# Patient Record
Sex: FEMALE | Race: BLACK OR AFRICAN AMERICAN | NOT HISPANIC OR LATINO | Employment: OTHER | URBAN - METROPOLITAN AREA
[De-identification: names, ages, dates, MRNs, and addresses within clinical notes are randomized per-mention and may not be internally consistent; named-entity substitution may affect disease eponyms.]

---

## 2019-01-01 ENCOUNTER — HOSPITAL ENCOUNTER (INPATIENT)
Facility: HOSPITAL | Age: 84
LOS: 5 days | Discharge: HOSPICE | End: 2019-12-07
Attending: EMERGENCY MEDICINE | Admitting: HOSPITALIST

## 2019-01-01 ENCOUNTER — APPOINTMENT (OUTPATIENT)
Dept: GENERAL RADIOLOGY | Facility: HOSPITAL | Age: 84
End: 2019-01-01

## 2019-01-01 ENCOUNTER — TRANSITIONAL CARE MANAGEMENT TELEPHONE ENCOUNTER (OUTPATIENT)
Dept: FAMILY MEDICINE CLINIC | Facility: CLINIC | Age: 84
End: 2019-01-01

## 2019-01-01 ENCOUNTER — APPOINTMENT (OUTPATIENT)
Dept: CT IMAGING | Facility: HOSPITAL | Age: 84
End: 2019-01-01

## 2019-01-01 ENCOUNTER — TELEPHONE (OUTPATIENT)
Dept: FAMILY MEDICINE CLINIC | Facility: CLINIC | Age: 84
End: 2019-01-01

## 2019-01-01 ENCOUNTER — APPOINTMENT (OUTPATIENT)
Dept: CARDIOLOGY | Facility: HOSPITAL | Age: 84
End: 2019-01-01

## 2019-01-01 ENCOUNTER — OFFICE VISIT (OUTPATIENT)
Dept: FAMILY MEDICINE CLINIC | Facility: CLINIC | Age: 84
End: 2019-01-01

## 2019-01-01 ENCOUNTER — APPOINTMENT (OUTPATIENT)
Dept: MRI IMAGING | Facility: HOSPITAL | Age: 84
End: 2019-01-01

## 2019-01-01 ENCOUNTER — HOSPITAL ENCOUNTER (OUTPATIENT)
Facility: HOSPITAL | Age: 84
Setting detail: OBSERVATION
Discharge: HOME HEALTH | End: 2019-10-09
Attending: EMERGENCY MEDICINE | Admitting: HOSPITALIST

## 2019-01-01 ENCOUNTER — HOSPITAL ENCOUNTER (INPATIENT)
Facility: HOSPITAL | Age: 84
LOS: 5 days | End: 2019-12-12
Attending: HOSPITALIST | Admitting: HOSPITALIST

## 2019-01-01 ENCOUNTER — HOSPITAL ENCOUNTER (INPATIENT)
Facility: HOSPITAL | Age: 84
LOS: 6 days | Discharge: NURSING, REHAB OR LONG-TERM CARE | End: 2019-11-15
Attending: EMERGENCY MEDICINE | Admitting: HOSPITALIST

## 2019-01-01 ENCOUNTER — READMISSION MANAGEMENT (OUTPATIENT)
Dept: CALL CENTER | Facility: HOSPITAL | Age: 84
End: 2019-01-01

## 2019-01-01 ENCOUNTER — HOSPITAL ENCOUNTER (OUTPATIENT)
Facility: HOSPITAL | Age: 84
Setting detail: OBSERVATION
Discharge: HOME | End: 2019-08-09
Attending: EMERGENCY MEDICINE | Admitting: HOSPITALIST

## 2019-01-01 VITALS
HEIGHT: 68 IN | HEART RATE: 97 BPM | WEIGHT: 118.17 LBS | DIASTOLIC BLOOD PRESSURE: 64 MMHG | OXYGEN SATURATION: 99 % | TEMPERATURE: 97.6 F | RESPIRATION RATE: 16 BRPM | SYSTOLIC BLOOD PRESSURE: 129 MMHG | BODY MASS INDEX: 17.91 KG/M2

## 2019-01-01 VITALS
HEART RATE: 75 BPM | WEIGHT: 110 LBS | TEMPERATURE: 98.9 F | OXYGEN SATURATION: 99 % | SYSTOLIC BLOOD PRESSURE: 144 MMHG | BODY MASS INDEX: 17.27 KG/M2 | DIASTOLIC BLOOD PRESSURE: 87 MMHG | HEIGHT: 67 IN | RESPIRATION RATE: 18 BRPM

## 2019-01-01 VITALS
TEMPERATURE: 97.6 F | WEIGHT: 118.4 LBS | SYSTOLIC BLOOD PRESSURE: 110 MMHG | DIASTOLIC BLOOD PRESSURE: 68 MMHG | HEIGHT: 63 IN | BODY MASS INDEX: 20.98 KG/M2 | HEART RATE: 71 BPM | OXYGEN SATURATION: 99 %

## 2019-01-01 VITALS
DIASTOLIC BLOOD PRESSURE: 47 MMHG | WEIGHT: 110.23 LBS | TEMPERATURE: 96.8 F | RESPIRATION RATE: 20 BRPM | SYSTOLIC BLOOD PRESSURE: 75 MMHG | HEIGHT: 64 IN | OXYGEN SATURATION: 100 % | BODY MASS INDEX: 18.82 KG/M2 | HEART RATE: 105 BPM

## 2019-01-01 VITALS — SYSTOLIC BLOOD PRESSURE: 74 MMHG | OXYGEN SATURATION: 79 % | TEMPERATURE: 97.2 F | DIASTOLIC BLOOD PRESSURE: 41 MMHG

## 2019-01-01 VITALS
HEIGHT: 63 IN | OXYGEN SATURATION: 98 % | SYSTOLIC BLOOD PRESSURE: 113 MMHG | RESPIRATION RATE: 16 BRPM | BODY MASS INDEX: 21.19 KG/M2 | DIASTOLIC BLOOD PRESSURE: 65 MMHG | TEMPERATURE: 97.7 F | WEIGHT: 119.6 LBS | HEART RATE: 87 BPM

## 2019-01-01 DIAGNOSIS — N39.0 COMPLICATED UTI (URINARY TRACT INFECTION): Primary | ICD-10-CM

## 2019-01-01 DIAGNOSIS — E86.0 DEHYDRATION: ICD-10-CM

## 2019-01-01 DIAGNOSIS — N17.9 AKI (ACUTE KIDNEY INJURY) (HCC): ICD-10-CM

## 2019-01-01 DIAGNOSIS — E61.1 IRON DEFICIENCY: ICD-10-CM

## 2019-01-01 DIAGNOSIS — E83.52 HYPERCALCEMIA: Primary | ICD-10-CM

## 2019-01-01 DIAGNOSIS — Z74.09 IMMOBILITY: ICD-10-CM

## 2019-01-01 DIAGNOSIS — E55.9 VITAMIN D DEFICIENCY: ICD-10-CM

## 2019-01-01 DIAGNOSIS — R55 SYNCOPE, UNSPECIFIED SYNCOPE TYPE: Primary | ICD-10-CM

## 2019-01-01 DIAGNOSIS — R53.1 GENERALIZED WEAKNESS: ICD-10-CM

## 2019-01-01 DIAGNOSIS — M25.561 ACUTE BILATERAL KNEE PAIN: ICD-10-CM

## 2019-01-01 DIAGNOSIS — R33.9 URINARY RETENTION: ICD-10-CM

## 2019-01-01 DIAGNOSIS — M15.9 PRIMARY OSTEOARTHRITIS INVOLVING MULTIPLE JOINTS: ICD-10-CM

## 2019-01-01 DIAGNOSIS — M25.562 ACUTE BILATERAL KNEE PAIN: ICD-10-CM

## 2019-01-01 DIAGNOSIS — S22.070A COMPRESSION FRACTURE OF T10 VERTEBRA, INITIAL ENCOUNTER (HCC): Primary | ICD-10-CM

## 2019-01-01 DIAGNOSIS — R53.1 WEAKNESS: ICD-10-CM

## 2019-01-01 DIAGNOSIS — R25.1 TREMOR: ICD-10-CM

## 2019-01-01 DIAGNOSIS — E87.20 LACTIC ACIDOSIS: ICD-10-CM

## 2019-01-01 DIAGNOSIS — K59.00 CONSTIPATION, UNSPECIFIED CONSTIPATION TYPE: ICD-10-CM

## 2019-01-01 DIAGNOSIS — K59.00 CONSTIPATION, UNSPECIFIED CONSTIPATION TYPE: Primary | ICD-10-CM

## 2019-01-01 DIAGNOSIS — L98.9 SKIN LESION: ICD-10-CM

## 2019-01-01 DIAGNOSIS — R35.0 URINARY FREQUENCY: Primary | ICD-10-CM

## 2019-01-01 DIAGNOSIS — R41.82 ALTERED MENTAL STATUS, UNSPECIFIED ALTERED MENTAL STATUS TYPE: ICD-10-CM

## 2019-01-01 DIAGNOSIS — D64.9 ANEMIA, UNSPECIFIED TYPE: ICD-10-CM

## 2019-01-01 LAB
25(OH)D3+25(OH)D2 SERPL-MCNC: 37.9 NG/ML (ref 30–100)
ALBUMIN SERPL-MCNC: 2.6 G/DL (ref 3.5–5.2)
ALBUMIN SERPL-MCNC: 3.2 G/DL (ref 3.5–5.2)
ALBUMIN SERPL-MCNC: 3.3 G/DL (ref 3.5–5.2)
ALBUMIN SERPL-MCNC: 3.5 G/DL (ref 3.5–5.2)
ALBUMIN SERPL-MCNC: 3.9 G/DL (ref 3.5–5.2)
ALBUMIN SERPL-MCNC: 4 G/DL (ref 3.5–5.2)
ALBUMIN/GLOB SERPL: 0.6 G/DL
ALBUMIN/GLOB SERPL: 0.6 G/DL
ALBUMIN/GLOB SERPL: 1 G/DL
ALBUMIN/GLOB SERPL: 1.2 G/DL
ALBUMIN/GLOB SERPL: 1.2 G/DL
ALBUMIN/GLOB SERPL: 1.3 G/DL
ALP SERPL-CCNC: 61 U/L (ref 39–117)
ALP SERPL-CCNC: 63 U/L (ref 39–117)
ALP SERPL-CCNC: 68 U/L (ref 39–117)
ALP SERPL-CCNC: 76 U/L (ref 39–117)
ALP SERPL-CCNC: 81 U/L (ref 39–117)
ALP SERPL-CCNC: 84 U/L (ref 39–117)
ALT SERPL W P-5'-P-CCNC: 14 U/L (ref 1–33)
ALT SERPL W P-5'-P-CCNC: 14 U/L (ref 1–33)
ALT SERPL W P-5'-P-CCNC: 17 U/L (ref 1–33)
ALT SERPL W P-5'-P-CCNC: 9 U/L (ref 1–33)
ALT SERPL W P-5'-P-CCNC: 9 U/L (ref 1–33)
ALT SERPL-CCNC: 8 U/L (ref 1–33)
ANION GAP SERPL CALCULATED.3IONS-SCNC: 10 MMOL/L (ref 5–15)
ANION GAP SERPL CALCULATED.3IONS-SCNC: 11.6 MMOL/L (ref 5–15)
ANION GAP SERPL CALCULATED.3IONS-SCNC: 12.4 MMOL/L (ref 5–15)
ANION GAP SERPL CALCULATED.3IONS-SCNC: 13.4 MMOL/L (ref 5–15)
ANION GAP SERPL CALCULATED.3IONS-SCNC: 14.2 MMOL/L (ref 5–15)
ANION GAP SERPL CALCULATED.3IONS-SCNC: 15.7 MMOL/L (ref 5–15)
ANION GAP SERPL CALCULATED.3IONS-SCNC: 17.3 MMOL/L (ref 5–15)
ANION GAP SERPL CALCULATED.3IONS-SCNC: 4.9 MMOL/L (ref 5–15)
ANION GAP SERPL CALCULATED.3IONS-SCNC: 8.4 MMOL/L (ref 5–15)
ANION GAP SERPL CALCULATED.3IONS-SCNC: 8.4 MMOL/L (ref 5–15)
ANION GAP SERPL CALCULATED.3IONS-SCNC: 8.7 MMOL/L (ref 5–15)
ANION GAP SERPL CALCULATED.3IONS-SCNC: 9.2 MMOL/L (ref 5–15)
ANION GAP SERPL CALCULATED.3IONS-SCNC: 9.2 MMOL/L (ref 5–15)
ANISOCYTOSIS BLD QL: ABNORMAL
AORTIC DIMENSIONLESS INDEX: 0.6 (DI)
AST SERPL-CCNC: 14 U/L (ref 1–32)
AST SERPL-CCNC: 14 U/L (ref 1–32)
AST SERPL-CCNC: 18 U/L (ref 1–32)
AST SERPL-CCNC: 22 U/L (ref 1–32)
AST SERPL-CCNC: 26 U/L (ref 1–32)
AST SERPL-CCNC: 44 U/L (ref 1–32)
BACTERIA BLD CULT: ABNORMAL
BACTERIA BLD CULT: NORMAL
BACTERIA SPEC AEROBE CULT: ABNORMAL
BACTERIA SPEC AEROBE CULT: NORMAL
BACTERIA SPEC AEROBE CULT: NORMAL
BACTERIA UR QL AUTO: ABNORMAL /HPF
BACTERIA UR QL AUTO: ABNORMAL /HPF
BASOPHILS # BLD AUTO: 0.01 10*3/MM3 (ref 0–0.2)
BASOPHILS # BLD AUTO: 0.01 10*3/MM3 (ref 0–0.2)
BASOPHILS # BLD AUTO: 0.03 10*3/MM3 (ref 0–0.2)
BASOPHILS # BLD AUTO: 0.03 10*3/MM3 (ref 0–0.2)
BASOPHILS NFR BLD AUTO: 0.1 % (ref 0–1.5)
BASOPHILS NFR BLD AUTO: 0.2 % (ref 0–1.5)
BASOPHILS NFR BLD AUTO: 0.2 % (ref 0–1.5)
BASOPHILS NFR BLD AUTO: 0.4 % (ref 0–1.5)
BH CV ECHO MEAS - AO MAX PG: 16.7 MMHG
BH CV ECHO MEAS - AO MEAN PG (FULL): 4 MMHG
BH CV ECHO MEAS - AO MEAN PG: 8 MMHG
BH CV ECHO MEAS - AO ROOT AREA (BSA CORRECTED): 1.4
BH CV ECHO MEAS - AO ROOT AREA: 3.8 CM^2
BH CV ECHO MEAS - AO ROOT DIAM: 2.2 CM
BH CV ECHO MEAS - AO V2 MAX: 204 CM/SEC
BH CV ECHO MEAS - AO V2 MEAN: 137 CM/SEC
BH CV ECHO MEAS - AO V2 VTI: 47.1 CM
BH CV ECHO MEAS - AVA(I,A): 2 CM^2
BH CV ECHO MEAS - AVA(I,D): 2 CM^2
BH CV ECHO MEAS - BSA(HAYCOCK): 1.6 M^2
BH CV ECHO MEAS - BSA: 1.6 M^2
BH CV ECHO MEAS - BZI_BMI: 22 KILOGRAMS/M^2
BH CV ECHO MEAS - BZI_METRIC_HEIGHT: 160 CM
BH CV ECHO MEAS - BZI_METRIC_WEIGHT: 56.2 KG
BH CV ECHO MEAS - EDV(CUBED): 64 ML
BH CV ECHO MEAS - EDV(MOD-SP2): 69 ML
BH CV ECHO MEAS - EDV(MOD-SP4): 79 ML
BH CV ECHO MEAS - EDV(TEICH): 70 ML
BH CV ECHO MEAS - EF(CUBED): 69.2 %
BH CV ECHO MEAS - EF(MOD-BP): 61 %
BH CV ECHO MEAS - EF(MOD-SP2): 65.2 %
BH CV ECHO MEAS - EF(MOD-SP4): 57 %
BH CV ECHO MEAS - EF(TEICH): 61.4 %
BH CV ECHO MEAS - ESV(CUBED): 19.7 ML
BH CV ECHO MEAS - ESV(MOD-SP2): 24 ML
BH CV ECHO MEAS - ESV(MOD-SP4): 34 ML
BH CV ECHO MEAS - ESV(TEICH): 27 ML
BH CV ECHO MEAS - FS: 32.5 %
BH CV ECHO MEAS - IVS/LVPW: 1.1
BH CV ECHO MEAS - IVSD: 1.1 CM
BH CV ECHO MEAS - LAT PEAK E' VEL: 5 CM/SEC
BH CV ECHO MEAS - LV DIASTOLIC VOL/BSA (35-75): 50.1 ML/M^2
BH CV ECHO MEAS - LV MASS(C)D: 136.2 GRAMS
BH CV ECHO MEAS - LV MASS(C)DI: 86.3 GRAMS/M^2
BH CV ECHO MEAS - LV MAX PG: 6 MMHG
BH CV ECHO MEAS - LV MEAN PG: 4 MMHG
BH CV ECHO MEAS - LV SYSTOLIC VOL/BSA (12-30): 21.5 ML/M^2
BH CV ECHO MEAS - LV V1 MAX: 120 CM/SEC
BH CV ECHO MEAS - LV V1 MEAN: 90.6 CM/SEC
BH CV ECHO MEAS - LV V1 VTI: 32.6 CM
BH CV ECHO MEAS - LVIDD: 4 CM
BH CV ECHO MEAS - LVIDS: 2.7 CM
BH CV ECHO MEAS - LVLD AP2: 6.7 CM
BH CV ECHO MEAS - LVLD AP4: 7.6 CM
BH CV ECHO MEAS - LVLS AP2: 5.7 CM
BH CV ECHO MEAS - LVLS AP4: 6.5 CM
BH CV ECHO MEAS - LVOT AREA (M): 2.8 CM^2
BH CV ECHO MEAS - LVOT AREA: 2.8 CM^2
BH CV ECHO MEAS - LVOT DIAM: 1.9 CM
BH CV ECHO MEAS - LVPWD: 1 CM
BH CV ECHO MEAS - MED PEAK E' VEL: 4 CM/SEC
BH CV ECHO MEAS - MR MAX PG: 121 MMHG
BH CV ECHO MEAS - MR MAX VEL: 550 CM/SEC
BH CV ECHO MEAS - MV A DUR: 0.16 SEC
BH CV ECHO MEAS - MV A MAX VEL: 123 CM/SEC
BH CV ECHO MEAS - MV DEC SLOPE: 343 CM/SEC^2
BH CV ECHO MEAS - MV DEC TIME: 335 SEC
BH CV ECHO MEAS - MV E MAX VEL: 89.8 CM/SEC
BH CV ECHO MEAS - MV E/A: 0.73
BH CV ECHO MEAS - MV MEAN PG: 3 MMHG
BH CV ECHO MEAS - MV P1/2T MAX VEL: 116 CM/SEC
BH CV ECHO MEAS - MV P1/2T: 99.1 MSEC
BH CV ECHO MEAS - MV V2 MEAN: 78.8 CM/SEC
BH CV ECHO MEAS - MV V2 VTI: 42.7 CM
BH CV ECHO MEAS - MVA P1/2T LCG: 1.9 CM^2
BH CV ECHO MEAS - MVA(P1/2T): 2.2 CM^2
BH CV ECHO MEAS - MVA(VTI): 2.2 CM^2
BH CV ECHO MEAS - PULM A REVS DUR: 0.13 SEC
BH CV ECHO MEAS - PULM A REVS VEL: 28.6 CM/SEC
BH CV ECHO MEAS - PULM DIAS VEL: 31.6 CM/SEC
BH CV ECHO MEAS - PULM S/D: 1.5
BH CV ECHO MEAS - PULM SYS VEL: 46.8 CM/SEC
BH CV ECHO MEAS - RAP SYSTOLE: 3 MMHG
BH CV ECHO MEAS - RVSP: 35 MMHG
BH CV ECHO MEAS - SI(AO): 113.4 ML/M^2
BH CV ECHO MEAS - SI(CUBED): 28.1 ML/M^2
BH CV ECHO MEAS - SI(LVOT): 58.6 ML/M^2
BH CV ECHO MEAS - SI(MOD-SP2): 28.5 ML/M^2
BH CV ECHO MEAS - SI(MOD-SP4): 28.5 ML/M^2
BH CV ECHO MEAS - SI(TEICH): 27.2 ML/M^2
BH CV ECHO MEAS - SV(AO): 179 ML
BH CV ECHO MEAS - SV(CUBED): 44.3 ML
BH CV ECHO MEAS - SV(LVOT): 92.5 ML
BH CV ECHO MEAS - SV(MOD-SP2): 45 ML
BH CV ECHO MEAS - SV(MOD-SP4): 45 ML
BH CV ECHO MEAS - SV(TEICH): 43 ML
BH CV ECHO MEAS - TAPSE (>1.6): 2.3 CM2
BH CV ECHO MEAS - TR MAX VEL: 283 CM/SEC
BH CV ECHO MEASUREMENTS AVERAGE E/E' RATIO: 19.96
BH CV XLRA - RV BASE: 3.4 CM
BH CV XLRA - TDI S': 14 CM/SEC
BILIRUB SERPL-MCNC: 0.2 MG/DL (ref 0.2–1.2)
BILIRUB SERPL-MCNC: 0.3 MG/DL (ref 0.2–1.2)
BILIRUB SERPL-MCNC: 0.3 MG/DL (ref 0.2–1.2)
BILIRUB SERPL-MCNC: 0.4 MG/DL (ref 0.2–1.2)
BILIRUB SERPL-MCNC: 0.4 MG/DL (ref 0.2–1.2)
BILIRUB SERPL-MCNC: 0.5 MG/DL (ref 0.2–1.2)
BILIRUB UR QL STRIP: NEGATIVE
BILIRUB UR QL STRIP: NEGATIVE
BUN BLD-MCNC: 18 MG/DL (ref 8–23)
BUN BLD-MCNC: 18 MG/DL (ref 8–23)
BUN BLD-MCNC: 23 MG/DL (ref 8–23)
BUN BLD-MCNC: 23 MG/DL (ref 8–23)
BUN BLD-MCNC: 24 MG/DL (ref 8–23)
BUN BLD-MCNC: 26 MG/DL (ref 8–23)
BUN BLD-MCNC: 32 MG/DL (ref 8–23)
BUN BLD-MCNC: 32 MG/DL (ref 8–23)
BUN BLD-MCNC: 37 MG/DL (ref 8–23)
BUN BLD-MCNC: 45 MG/DL (ref 8–23)
BUN BLD-MCNC: 46 MG/DL (ref 8–23)
BUN BLD-MCNC: 48 MG/DL (ref 8–23)
BUN BLD-MCNC: 49 MG/DL (ref 8–23)
BUN SERPL-MCNC: 22 MG/DL (ref 8–23)
BUN/CREAT SERPL: 16.5 (ref 7–25)
BUN/CREAT SERPL: 17.7 (ref 7–25)
BUN/CREAT SERPL: 20 (ref 7–25)
BUN/CREAT SERPL: 25 (ref 7–25)
BUN/CREAT SERPL: 25 (ref 7–25)
BUN/CREAT SERPL: 26.2 (ref 7–25)
BUN/CREAT SERPL: 26.4 (ref 7–25)
BUN/CREAT SERPL: 27 (ref 7–25)
BUN/CREAT SERPL: 27.2 (ref 7–25)
BUN/CREAT SERPL: 28.9 (ref 7–25)
BUN/CREAT SERPL: 30.1 (ref 7–25)
BUN/CREAT SERPL: 32.7 (ref 7–25)
BUN/CREAT SERPL: 33.3 (ref 7–25)
BUN/CREAT SERPL: 44.5 (ref 7–25)
BURR CELLS BLD QL SMEAR: ABNORMAL
CA-I BLD-MCNC: 6 MG/DL (ref 4.6–5.4)
CA-I SERPL ISE-MCNC: 1.51 MMOL/L (ref 1.15–1.35)
CALCIUM SERPL-MCNC: 10.7 MG/DL (ref 8.2–9.6)
CALCIUM SPEC-SCNC: 10 MG/DL (ref 8.2–9.6)
CALCIUM SPEC-SCNC: 10 MG/DL (ref 8.2–9.6)
CALCIUM SPEC-SCNC: 10.1 MG/DL (ref 8.2–9.6)
CALCIUM SPEC-SCNC: 10.3 MG/DL (ref 8.2–9.6)
CALCIUM SPEC-SCNC: 10.4 MG/DL (ref 8.2–9.6)
CALCIUM SPEC-SCNC: 10.7 MG/DL (ref 8.2–9.6)
CALCIUM SPEC-SCNC: 11 MG/DL (ref 8.2–9.6)
CALCIUM SPEC-SCNC: 11.4 MG/DL (ref 8.2–9.6)
CALCIUM SPEC-SCNC: 9.4 MG/DL (ref 8.2–9.6)
CALCIUM SPEC-SCNC: 9.4 MG/DL (ref 8.2–9.6)
CALCIUM SPEC-SCNC: 9.6 MG/DL (ref 8.2–9.6)
CALCIUM SPEC-SCNC: 9.7 MG/DL (ref 8.2–9.6)
CALCIUM SPEC-SCNC: 9.8 MG/DL (ref 8.2–9.6)
CHLORIDE SERPL-SCNC: 102 MMOL/L (ref 98–107)
CHLORIDE SERPL-SCNC: 103 MMOL/L (ref 98–107)
CHLORIDE SERPL-SCNC: 105 MMOL/L (ref 98–107)
CHLORIDE SERPL-SCNC: 105 MMOL/L (ref 98–107)
CHLORIDE SERPL-SCNC: 107 MMOL/L (ref 98–107)
CHLORIDE SERPL-SCNC: 108 MMOL/L (ref 98–107)
CHLORIDE SERPL-SCNC: 109 MMOL/L (ref 98–107)
CHLORIDE SERPL-SCNC: 116 MMOL/L (ref 98–107)
CHLORIDE SERPL-SCNC: 96 MMOL/L (ref 98–107)
CHLORIDE SERPL-SCNC: 98 MMOL/L (ref 98–107)
CHOLEST SERPL-MCNC: 122 MG/DL (ref 0–200)
CHOLEST SERPL-MCNC: 149 MG/DL (ref 0–200)
CLARITY UR: ABNORMAL
CLARITY UR: CLEAR
CO2 SERPL-SCNC: 17.3 MMOL/L (ref 22–29)
CO2 SERPL-SCNC: 18.8 MMOL/L (ref 22–29)
CO2 SERPL-SCNC: 22.6 MMOL/L (ref 22–29)
CO2 SERPL-SCNC: 22.7 MMOL/L (ref 22–29)
CO2 SERPL-SCNC: 23.6 MMOL/L (ref 22–29)
CO2 SERPL-SCNC: 24.6 MMOL/L (ref 22–29)
CO2 SERPL-SCNC: 24.6 MMOL/L (ref 22–29)
CO2 SERPL-SCNC: 25.2 MMOL/L (ref 22–29)
CO2 SERPL-SCNC: 25.8 MMOL/L (ref 22–29)
CO2 SERPL-SCNC: 26.8 MMOL/L (ref 22–29)
CO2 SERPL-SCNC: 27 MMOL/L (ref 22–29)
CO2 SERPL-SCNC: 27.1 MMOL/L (ref 22–29)
CO2 SERPL-SCNC: 27.3 MMOL/L (ref 22–29)
CO2 SERPL-SCNC: 30.4 MMOL/L (ref 22–29)
COLOR UR: ABNORMAL
COLOR UR: YELLOW
CREAT BLD-MCNC: 0.87 MG/DL (ref 0.57–1)
CREAT BLD-MCNC: 0.89 MG/DL (ref 0.57–1)
CREAT BLD-MCNC: 0.9 MG/DL (ref 0.57–1)
CREAT BLD-MCNC: 0.9 MG/DL (ref 0.57–1)
CREAT BLD-MCNC: 0.92 MG/DL (ref 0.57–1)
CREAT BLD-MCNC: 0.98 MG/DL (ref 0.57–1)
CREAT BLD-MCNC: 1.09 MG/DL (ref 0.57–1)
CREAT BLD-MCNC: 1.1 MG/DL (ref 0.57–1)
CREAT BLD-MCNC: 1.22 MG/DL (ref 0.57–1)
CREAT BLD-MCNC: 1.23 MG/DL (ref 0.57–1)
CREAT BLD-MCNC: 1.35 MG/DL (ref 0.57–1)
CREAT BLD-MCNC: 1.69 MG/DL (ref 0.57–1)
CREAT BLD-MCNC: 2.71 MG/DL (ref 0.57–1)
CREAT SERPL-MCNC: 0.88 MG/DL (ref 0.57–1)
D-LACTATE SERPL-SCNC: 2.2 MMOL/L (ref 0.5–2)
D-LACTATE SERPL-SCNC: 2.6 MMOL/L (ref 0.5–2)
DEPRECATED RDW RBC AUTO: 39.9 FL (ref 37–54)
DEPRECATED RDW RBC AUTO: 40.5 FL (ref 37–54)
DEPRECATED RDW RBC AUTO: 41 FL (ref 37–54)
DEPRECATED RDW RBC AUTO: 41.3 FL (ref 37–54)
DEPRECATED RDW RBC AUTO: 42.2 FL (ref 37–54)
DEPRECATED RDW RBC AUTO: 42.8 FL (ref 37–54)
DEPRECATED RDW RBC AUTO: 43.6 FL (ref 37–54)
DEPRECATED RDW RBC AUTO: 43.9 FL (ref 37–54)
DEPRECATED RDW RBC AUTO: 44.3 FL (ref 37–54)
DEPRECATED RDW RBC AUTO: 44.7 FL (ref 37–54)
DEPRECATED RDW RBC AUTO: 49.6 FL (ref 37–54)
DEPRECATED RDW RBC AUTO: 50.4 FL (ref 37–54)
DEPRECATED RDW RBC AUTO: 52.4 FL (ref 37–54)
EOSINOPHIL # BLD AUTO: 0 10*3/MM3 (ref 0–0.4)
EOSINOPHIL # BLD AUTO: 0.01 10*3/MM3 (ref 0–0.4)
EOSINOPHIL # BLD MANUAL: 0.06 10*3/MM3 (ref 0–0.4)
EOSINOPHIL NFR BLD AUTO: 0 % (ref 0.3–6.2)
EOSINOPHIL NFR BLD AUTO: 0.2 % (ref 0.3–6.2)
EOSINOPHIL NFR BLD MANUAL: 1 % (ref 0.3–6.2)
ERYTHROCYTE [DISTWIDTH] IN BLOOD BY AUTOMATED COUNT: 13 % (ref 12.3–15.4)
ERYTHROCYTE [DISTWIDTH] IN BLOOD BY AUTOMATED COUNT: 13.1 % (ref 12.3–15.4)
ERYTHROCYTE [DISTWIDTH] IN BLOOD BY AUTOMATED COUNT: 13.1 % (ref 12.3–15.4)
ERYTHROCYTE [DISTWIDTH] IN BLOOD BY AUTOMATED COUNT: 13.3 % (ref 12.3–15.4)
ERYTHROCYTE [DISTWIDTH] IN BLOOD BY AUTOMATED COUNT: 13.3 % (ref 12.3–15.4)
ERYTHROCYTE [DISTWIDTH] IN BLOOD BY AUTOMATED COUNT: 13.6 % (ref 12.3–15.4)
ERYTHROCYTE [DISTWIDTH] IN BLOOD BY AUTOMATED COUNT: 14.5 % (ref 12.3–15.4)
ERYTHROCYTE [DISTWIDTH] IN BLOOD BY AUTOMATED COUNT: 14.6 % (ref 12.3–15.4)
ERYTHROCYTE [DISTWIDTH] IN BLOOD BY AUTOMATED COUNT: 14.6 % (ref 12.3–15.4)
FOLATE SERPL-MCNC: >20 NG/ML (ref 4.78–24.2)
GFR SERPL CREATININE-BSD FRML MDRD: 20 ML/MIN/1.73
GFR SERPL CREATININE-BSD FRML MDRD: 34 ML/MIN/1.73
GFR SERPL CREATININE-BSD FRML MDRD: 41 ML/MIN/1.73
GFR SERPL CREATININE-BSD FRML MDRD: 44 ML/MIN/1.73
GFR SERPL CREATININE-BSD FRML MDRD: 49 ML/MIN/1.73
GFR SERPL CREATININE-BSD FRML MDRD: 50 ML/MIN/1.73
GFR SERPL CREATININE-BSD FRML MDRD: 56 ML/MIN/1.73
GFR SERPL CREATININE-BSD FRML MDRD: 57 ML/MIN/1.73
GFR SERPL CREATININE-BSD FRML MDRD: 64 ML/MIN/1.73
GFR SERPL CREATININE-BSD FRML MDRD: 69 ML/MIN/1.73
GFR SERPL CREATININE-BSD FRML MDRD: 71 ML/MIN/1.73
GFR SERPL CREATININE-BSD FRML MDRD: 71 ML/MIN/1.73
GFR SERPL CREATININE-BSD FRML MDRD: 72 ML/MIN/1.73
GFR SERPL CREATININE-BSD FRML MDRD: 73 ML/MIN/1.73
GLOBULIN SER CALC-MCNC: 3.3 GM/DL
GLOBULIN UR ELPH-MCNC: 2.8 GM/DL
GLOBULIN UR ELPH-MCNC: 3.1 GM/DL
GLOBULIN UR ELPH-MCNC: 3.6 GM/DL
GLOBULIN UR ELPH-MCNC: 4.1 GM/DL
GLOBULIN UR ELPH-MCNC: 5.2 GM/DL
GLUCOSE BLD-MCNC: 101 MG/DL (ref 65–99)
GLUCOSE BLD-MCNC: 115 MG/DL (ref 65–99)
GLUCOSE BLD-MCNC: 116 MG/DL (ref 65–99)
GLUCOSE BLD-MCNC: 130 MG/DL (ref 65–99)
GLUCOSE BLD-MCNC: 144 MG/DL (ref 65–99)
GLUCOSE BLD-MCNC: 147 MG/DL (ref 65–99)
GLUCOSE BLD-MCNC: 78 MG/DL (ref 65–99)
GLUCOSE BLD-MCNC: 82 MG/DL (ref 65–99)
GLUCOSE BLD-MCNC: 83 MG/DL (ref 65–99)
GLUCOSE BLD-MCNC: 92 MG/DL (ref 65–99)
GLUCOSE BLD-MCNC: 93 MG/DL (ref 65–99)
GLUCOSE BLD-MCNC: 93 MG/DL (ref 65–99)
GLUCOSE BLD-MCNC: 97 MG/DL (ref 65–99)
GLUCOSE BLDC GLUCOMTR-MCNC: 142 MG/DL (ref 70–130)
GLUCOSE BLDC GLUCOMTR-MCNC: 93 MG/DL (ref 70–130)
GLUCOSE BLDC GLUCOMTR-MCNC: 94 MG/DL (ref 70–130)
GLUCOSE SERPL-MCNC: 112 MG/DL (ref 65–99)
GLUCOSE UR STRIP-MCNC: NEGATIVE MG/DL
GLUCOSE UR STRIP-MCNC: NEGATIVE MG/DL
GRAM STN SPEC: ABNORMAL
GRAM STN SPEC: ABNORMAL
HBA1C MFR BLD: 5.5 % (ref 4.8–5.6)
HBA1C MFR BLD: 6.1 % (ref 4.8–5.6)
HCT VFR BLD AUTO: 28.7 % (ref 34–46.6)
HCT VFR BLD AUTO: 29 % (ref 34–46.6)
HCT VFR BLD AUTO: 29 % (ref 34–46.6)
HCT VFR BLD AUTO: 29.2 % (ref 34–46.6)
HCT VFR BLD AUTO: 29.6 % (ref 34–46.6)
HCT VFR BLD AUTO: 30.3 % (ref 34–46.6)
HCT VFR BLD AUTO: 30.8 % (ref 34–46.6)
HCT VFR BLD AUTO: 31.1 % (ref 34–46.6)
HCT VFR BLD AUTO: 32.5 % (ref 34–46.6)
HCT VFR BLD AUTO: 32.6 % (ref 34–46.6)
HCT VFR BLD AUTO: 34.2 % (ref 34–46.6)
HCT VFR BLD AUTO: 35.2 % (ref 34–46.6)
HCT VFR BLD AUTO: 40.3 % (ref 34–46.6)
HDLC SERPL-MCNC: 21 MG/DL (ref 40–60)
HDLC SERPL-MCNC: 49 MG/DL (ref 40–60)
HGB BLD-MCNC: 10 G/DL (ref 12–15.9)
HGB BLD-MCNC: 10 G/DL (ref 12–15.9)
HGB BLD-MCNC: 10.1 G/DL (ref 12–15.9)
HGB BLD-MCNC: 10.1 G/DL (ref 12–15.9)
HGB BLD-MCNC: 10.3 G/DL (ref 12–15.9)
HGB BLD-MCNC: 10.5 G/DL (ref 12–15.9)
HGB BLD-MCNC: 11.2 G/DL (ref 12–15.9)
HGB BLD-MCNC: 11.6 G/DL (ref 12–15.9)
HGB BLD-MCNC: 12.7 G/DL (ref 12–15.9)
HGB BLD-MCNC: 8.9 G/DL (ref 12–15.9)
HGB BLD-MCNC: 9.4 G/DL (ref 12–15.9)
HGB BLD-MCNC: 9.4 G/DL (ref 12–15.9)
HGB BLD-MCNC: 9.7 G/DL (ref 12–15.9)
HGB UR QL STRIP.AUTO: ABNORMAL
HGB UR QL STRIP.AUTO: ABNORMAL
HOLD SPECIMEN: NORMAL
HYALINE CASTS UR QL AUTO: ABNORMAL /LPF
HYALINE CASTS UR QL AUTO: ABNORMAL /LPF
IMM GRANULOCYTES # BLD AUTO: 0.03 10*3/MM3 (ref 0–0.05)
IMM GRANULOCYTES # BLD AUTO: 0.06 10*3/MM3 (ref 0–0.05)
IMM GRANULOCYTES # BLD AUTO: 0.13 10*3/MM3 (ref 0–0.05)
IMM GRANULOCYTES # BLD AUTO: 0.19 10*3/MM3 (ref 0–0.05)
IMM GRANULOCYTES NFR BLD AUTO: 0.5 % (ref 0–0.5)
IMM GRANULOCYTES NFR BLD AUTO: 0.6 % (ref 0–0.5)
IMM GRANULOCYTES NFR BLD AUTO: 1.5 % (ref 0–0.5)
IMM GRANULOCYTES NFR BLD AUTO: 1.5 % (ref 0–0.5)
IRON SATN MFR SERPL: 16 % (ref 20–50)
IRON SERPL-MCNC: 42 MCG/DL (ref 37–145)
ISOLATED FROM: ABNORMAL
ISOLATED FROM: ABNORMAL
KETONES UR QL STRIP: NEGATIVE
KETONES UR QL STRIP: NEGATIVE
LDLC SERPL CALC-MCNC: 104 MG/DL (ref 0–100)
LDLC SERPL CALC-MCNC: 66 MG/DL (ref 0–100)
LDLC/HDLC SERPL: 1.34 {RATIO}
LDLC/HDLC SERPL: 4.97 {RATIO}
LEFT ATRIUM VOLUME INDEX: 36.5 ML/M2
LEUKOCYTE ESTERASE UR QL STRIP.AUTO: ABNORMAL
LEUKOCYTE ESTERASE UR QL STRIP.AUTO: ABNORMAL
LIPASE SERPL-CCNC: 22 U/L (ref 13–60)
LYMPHOCYTES # BLD AUTO: 0.68 10*3/MM3 (ref 0.7–3.1)
LYMPHOCYTES # BLD AUTO: 0.69 10*3/MM3 (ref 0.7–3.1)
LYMPHOCYTES # BLD AUTO: 0.73 10*3/MM3 (ref 0.7–3.1)
LYMPHOCYTES # BLD AUTO: 0.96 10*3/MM3 (ref 0.7–3.1)
LYMPHOCYTES # BLD MANUAL: 0.49 10*3/MM3 (ref 0.7–3.1)
LYMPHOCYTES # BLD MANUAL: 1.16 10*3/MM3 (ref 0.7–3.1)
LYMPHOCYTES NFR BLD AUTO: 14.8 % (ref 19.6–45.3)
LYMPHOCYTES NFR BLD AUTO: 5.6 % (ref 19.6–45.3)
LYMPHOCYTES NFR BLD AUTO: 6.8 % (ref 19.6–45.3)
LYMPHOCYTES NFR BLD AUTO: 8.1 % (ref 19.6–45.3)
LYMPHOCYTES NFR BLD MANUAL: 2 % (ref 5–12)
LYMPHOCYTES NFR BLD MANUAL: 21 % (ref 19.6–45.3)
LYMPHOCYTES NFR BLD MANUAL: 4 % (ref 19.6–45.3)
LYMPHOCYTES NFR BLD MANUAL: 5 % (ref 5–12)
MAGNESIUM SERPL-MCNC: 2.1 MG/DL (ref 1.7–2.3)
MAGNESIUM SERPL-MCNC: 2.3 MG/DL (ref 1.7–2.3)
MAGNESIUM SERPL-MCNC: 2.3 MG/DL (ref 1.7–2.3)
MAXIMAL PREDICTED HEART RATE: 126 BPM
MCH RBC QN AUTO: 28.2 PG (ref 26.6–33)
MCH RBC QN AUTO: 28.5 PG (ref 26.6–33)
MCH RBC QN AUTO: 28.6 PG (ref 26.6–33)
MCH RBC QN AUTO: 28.7 PG (ref 26.6–33)
MCH RBC QN AUTO: 28.9 PG (ref 26.6–33)
MCH RBC QN AUTO: 29 PG (ref 26.6–33)
MCH RBC QN AUTO: 29 PG (ref 26.6–33)
MCH RBC QN AUTO: 29.2 PG (ref 26.6–33)
MCH RBC QN AUTO: 29.3 PG (ref 26.6–33)
MCH RBC QN AUTO: 29.4 PG (ref 26.6–33)
MCH RBC QN AUTO: 29.4 PG (ref 26.6–33)
MCH RBC QN AUTO: 29.8 PG (ref 26.6–33)
MCH RBC QN AUTO: 29.9 PG (ref 26.6–33)
MCHC RBC AUTO-ENTMCNC: 29.8 G/DL (ref 31.5–35.7)
MCHC RBC AUTO-ENTMCNC: 30.5 G/DL (ref 31.5–35.7)
MCHC RBC AUTO-ENTMCNC: 31 G/DL (ref 31.5–35.7)
MCHC RBC AUTO-ENTMCNC: 31.5 G/DL (ref 31.5–35.7)
MCHC RBC AUTO-ENTMCNC: 31.8 G/DL (ref 31.5–35.7)
MCHC RBC AUTO-ENTMCNC: 31.8 G/DL (ref 31.5–35.7)
MCHC RBC AUTO-ENTMCNC: 32.4 G/DL (ref 31.5–35.7)
MCHC RBC AUTO-ENTMCNC: 32.5 G/DL (ref 31.5–35.7)
MCHC RBC AUTO-ENTMCNC: 33.8 G/DL (ref 31.5–35.7)
MCHC RBC AUTO-ENTMCNC: 33.9 G/DL (ref 31.5–35.7)
MCHC RBC AUTO-ENTMCNC: 34 G/DL (ref 31.5–35.7)
MCHC RBC AUTO-ENTMCNC: 34.5 G/DL (ref 31.5–35.7)
MCHC RBC AUTO-ENTMCNC: 35.2 G/DL (ref 31.5–35.7)
MCV RBC AUTO: 82.2 FL (ref 79–97)
MCV RBC AUTO: 83.1 FL (ref 79–97)
MCV RBC AUTO: 85.5 FL (ref 79–97)
MCV RBC AUTO: 87.8 FL (ref 79–97)
MCV RBC AUTO: 88.4 FL (ref 79–97)
MCV RBC AUTO: 89.6 FL (ref 79–97)
MCV RBC AUTO: 89.7 FL (ref 79–97)
MCV RBC AUTO: 90.1 FL (ref 79–97)
MCV RBC AUTO: 90.3 FL (ref 79–97)
MCV RBC AUTO: 92.4 FL (ref 79–97)
MCV RBC AUTO: 93.9 FL (ref 79–97)
MCV RBC AUTO: 94.8 FL (ref 79–97)
MCV RBC AUTO: 97.3 FL (ref 79–97)
MONOCYTES # BLD AUTO: 0.25 10*3/MM3 (ref 0.1–0.9)
MONOCYTES # BLD AUTO: 0.28 10*3/MM3 (ref 0.1–0.9)
MONOCYTES # BLD AUTO: 0.61 10*3/MM3 (ref 0.1–0.9)
MONOCYTES # BLD AUTO: 0.82 10*3/MM3 (ref 0.1–0.9)
MONOCYTES # BLD AUTO: 1.12 10*3/MM3 (ref 0.1–0.9)
MONOCYTES # BLD AUTO: 1.68 10*3/MM3 (ref 0.1–0.9)
MONOCYTES NFR BLD AUTO: 15.7 % (ref 5–12)
MONOCYTES NFR BLD AUTO: 17.2 % (ref 5–12)
MONOCYTES NFR BLD AUTO: 6.6 % (ref 5–12)
MONOCYTES NFR BLD AUTO: 7.2 % (ref 5–12)
NEUTROPHILS # BLD AUTO: 10.67 10*3/MM3 (ref 1.7–7)
NEUTROPHILS # BLD AUTO: 11.5 10*3/MM3 (ref 1.7–7)
NEUTROPHILS # BLD AUTO: 4.03 10*3/MM3 (ref 1.7–7)
NEUTROPHILS # BLD AUTO: 4.37 10*3/MM3 (ref 1.7–7)
NEUTROPHILS # BLD AUTO: 6.98 10*3/MM3 (ref 1.7–7)
NEUTROPHILS # BLD AUTO: 8.25 10*3/MM3 (ref 1.7–7)
NEUTROPHILS NFR BLD AUTO: 67.1 % (ref 42.7–76)
NEUTROPHILS NFR BLD AUTO: 76.8 % (ref 42.7–76)
NEUTROPHILS NFR BLD AUTO: 82.8 % (ref 42.7–76)
NEUTROPHILS NFR BLD AUTO: 86.1 % (ref 42.7–76)
NEUTROPHILS NFR BLD MANUAL: 73 % (ref 42.7–76)
NEUTROPHILS NFR BLD MANUAL: 93.9 % (ref 42.7–76)
NITRITE UR QL STRIP: NEGATIVE
NITRITE UR QL STRIP: NEGATIVE
NRBC BLD AUTO-RTO: 0 /100 WBC (ref 0–0.2)
NRBC BLD AUTO-RTO: 0.1 /100 WBC (ref 0–0.2)
NT-PROBNP SERPL-MCNC: 193.2 PG/ML (ref 5–1800)
NT-PROBNP SERPL-MCNC: 2576 PG/ML (ref 5–1800)
OVALOCYTES BLD QL SMEAR: ABNORMAL
PH UR STRIP.AUTO: 6.5 [PH] (ref 5–8)
PH UR STRIP.AUTO: >=9 [PH] (ref 5–8)
PLAT MORPH BLD: NORMAL
PLATELET # BLD AUTO: 104 10*3/MM3 (ref 140–450)
PLATELET # BLD AUTO: 111 10*3/MM3 (ref 140–450)
PLATELET # BLD AUTO: 114 10*3/MM3 (ref 140–450)
PLATELET # BLD AUTO: 117 10*3/MM3 (ref 140–450)
PLATELET # BLD AUTO: 130 10*3/MM3 (ref 140–450)
PLATELET # BLD AUTO: 137 10*3/MM3 (ref 140–450)
PLATELET # BLD AUTO: 138 10*3/MM3 (ref 140–450)
PLATELET # BLD AUTO: 141 10*3/MM3 (ref 140–450)
PLATELET # BLD AUTO: 156 10*3/MM3 (ref 140–450)
PLATELET # BLD AUTO: 170 10*3/MM3 (ref 140–450)
PLATELET # BLD AUTO: 191 10*3/MM3 (ref 140–450)
PLATELET # BLD AUTO: 242 10*3/MM3 (ref 140–450)
PLATELET # BLD AUTO: 316 10*3/MM3 (ref 140–450)
PMV BLD AUTO: 10.3 FL (ref 6–12)
PMV BLD AUTO: 10.4 FL (ref 6–12)
PMV BLD AUTO: 10.4 FL (ref 6–12)
PMV BLD AUTO: 10.5 FL (ref 6–12)
PMV BLD AUTO: 10.6 FL (ref 6–12)
PMV BLD AUTO: 10.6 FL (ref 6–12)
PMV BLD AUTO: 10.8 FL (ref 6–12)
PMV BLD AUTO: 10.9 FL (ref 6–12)
PMV BLD AUTO: 10.9 FL (ref 6–12)
PMV BLD AUTO: 11.1 FL (ref 6–12)
PMV BLD AUTO: 11.2 FL (ref 6–12)
PMV BLD AUTO: 11.4 FL (ref 6–12)
PMV BLD AUTO: 11.5 FL (ref 6–12)
POIKILOCYTOSIS BLD QL SMEAR: ABNORMAL
POLYCHROMASIA BLD QL SMEAR: ABNORMAL
POTASSIUM BLD-SCNC: 3.2 MMOL/L (ref 3.5–5.2)
POTASSIUM BLD-SCNC: 3.8 MMOL/L (ref 3.5–5.2)
POTASSIUM BLD-SCNC: 4 MMOL/L (ref 3.5–5.2)
POTASSIUM BLD-SCNC: 4 MMOL/L (ref 3.5–5.2)
POTASSIUM BLD-SCNC: 4.2 MMOL/L (ref 3.5–5.2)
POTASSIUM BLD-SCNC: 4.4 MMOL/L (ref 3.5–5.2)
POTASSIUM BLD-SCNC: 4.5 MMOL/L (ref 3.5–5.2)
POTASSIUM BLD-SCNC: 4.8 MMOL/L (ref 3.5–5.2)
POTASSIUM BLD-SCNC: 5.1 MMOL/L (ref 3.5–5.2)
POTASSIUM SERPL-SCNC: 4.6 MMOL/L (ref 3.5–5.2)
PROCALCITONIN SERPL-MCNC: 0.6 NG/ML (ref 0.1–0.25)
PROT SERPL-MCNC: 6.1 G/DL (ref 6–8.5)
PROT SERPL-MCNC: 6.7 G/DL (ref 6–8.5)
PROT SERPL-MCNC: 7 G/DL (ref 6–8.5)
PROT SERPL-MCNC: 7.1 G/DL (ref 6–8.5)
PROT SERPL-MCNC: 7.3 G/DL (ref 6–8.5)
PROT SERPL-MCNC: 8.4 G/DL (ref 6–8.5)
PROT UR QL STRIP: ABNORMAL
PROT UR QL STRIP: ABNORMAL
PTH-INTACT SERPL-MCNC: 58.9 PG/ML (ref 15–65)
RBC # BLD AUTO: 3.11 10*6/MM3 (ref 3.77–5.28)
RBC # BLD AUTO: 3.21 10*6/MM3 (ref 3.77–5.28)
RBC # BLD AUTO: 3.3 10*6/MM3 (ref 3.77–5.28)
RBC # BLD AUTO: 3.34 10*6/MM3 (ref 3.77–5.28)
RBC # BLD AUTO: 3.42 10*6/MM3 (ref 3.77–5.28)
RBC # BLD AUTO: 3.44 10*6/MM3 (ref 3.77–5.28)
RBC # BLD AUTO: 3.45 10*6/MM3 (ref 3.77–5.28)
RBC # BLD AUTO: 3.49 10*6/MM3 (ref 3.77–5.28)
RBC # BLD AUTO: 3.49 10*6/MM3 (ref 3.77–5.28)
RBC # BLD AUTO: 3.52 10*6/MM3 (ref 3.77–5.28)
RBC # BLD AUTO: 3.81 10*6/MM3 (ref 3.77–5.28)
RBC # BLD AUTO: 4 10*6/MM3 (ref 3.77–5.28)
RBC # BLD AUTO: 4.5 10*6/MM3 (ref 3.77–5.28)
RBC # UR: ABNORMAL /HPF
RBC # UR: ABNORMAL /HPF
RBC MORPH BLD: NORMAL
RBC MORPH BLD: NORMAL
REF LAB TEST METHOD: ABNORMAL
REF LAB TEST METHOD: ABNORMAL
SODIUM BLD-SCNC: 136 MMOL/L (ref 136–145)
SODIUM BLD-SCNC: 137 MMOL/L (ref 136–145)
SODIUM BLD-SCNC: 138 MMOL/L (ref 136–145)
SODIUM BLD-SCNC: 138 MMOL/L (ref 136–145)
SODIUM BLD-SCNC: 139 MMOL/L (ref 136–145)
SODIUM BLD-SCNC: 139 MMOL/L (ref 136–145)
SODIUM BLD-SCNC: 140 MMOL/L (ref 136–145)
SODIUM BLD-SCNC: 141 MMOL/L (ref 136–145)
SODIUM BLD-SCNC: 143 MMOL/L (ref 136–145)
SODIUM BLD-SCNC: 149 MMOL/L (ref 136–145)
SODIUM BLD-SCNC: 149 MMOL/L (ref 136–145)
SODIUM SERPL-SCNC: 141 MMOL/L (ref 136–145)
SP GR UR STRIP: 1.02 (ref 1–1.03)
SP GR UR STRIP: 1.02 (ref 1–1.03)
SQUAMOUS #/AREA URNS HPF: ABNORMAL /HPF
SQUAMOUS #/AREA URNS HPF: ABNORMAL /HPF
STRESS TARGET HR: 107 BPM
T4 FREE SERPL-MCNC: 1.48 NG/DL (ref 0.93–1.7)
TIBC SERPL-MCNC: 254 MCG/DL
TRIGL SERPL-MCNC: 118 MG/DL (ref 0–150)
TRIGL SERPL-MCNC: 37 MG/DL (ref 0–150)
TROPONIN T SERPL-MCNC: 0.02 NG/ML (ref 0–0.03)
TROPONIN T SERPL-MCNC: 0.02 NG/ML (ref 0–0.03)
TROPONIN T SERPL-MCNC: <0.01 NG/ML (ref 0–0.03)
TSH SERPL DL<=0.05 MIU/L-ACNC: 0.3 UIU/ML (ref 0.27–4.2)
TSH SERPL DL<=0.05 MIU/L-ACNC: 1.16 MIU/ML (ref 0.27–4.2)
UIBC SERPL-MCNC: 212 MCG/DL (ref 112–346)
UROBILINOGEN UR QL STRIP: ABNORMAL
UROBILINOGEN UR QL STRIP: ABNORMAL
VIT B12 SERPL-MCNC: 360 PG/ML (ref 211–946)
VLDLC SERPL-MCNC: 23.6 MG/DL (ref 5–40)
VLDLC SERPL-MCNC: 7.4 MG/DL (ref 5–40)
WBC MORPH BLD: NORMAL
WBC NRBC COR # BLD: 10.73 10*3/MM3 (ref 3.4–10.8)
WBC NRBC COR # BLD: 12.06 10*3/MM3 (ref 3.4–10.8)
WBC NRBC COR # BLD: 12.25 10*3/MM3 (ref 3.4–10.8)
WBC NRBC COR # BLD: 12.4 10*3/MM3 (ref 3.4–10.8)
WBC NRBC COR # BLD: 12.65 10*3/MM3 (ref 3.4–10.8)
WBC NRBC COR # BLD: 4.87 10*3/MM3 (ref 3.4–10.8)
WBC NRBC COR # BLD: 4.88 10*3/MM3 (ref 3.4–10.8)
WBC NRBC COR # BLD: 5.26 10*3/MM3 (ref 3.4–10.8)
WBC NRBC COR # BLD: 5.52 10*3/MM3 (ref 3.4–10.8)
WBC NRBC COR # BLD: 6.5 10*3/MM3 (ref 3.4–10.8)
WBC NRBC COR # BLD: 7.37 10*3/MM3 (ref 3.4–10.8)
WBC NRBC COR # BLD: 8.43 10*3/MM3 (ref 3.4–10.8)
WBC NRBC COR # BLD: 8.48 10*3/MM3 (ref 3.4–10.8)
WBC UR QL AUTO: ABNORMAL /HPF
WBC UR QL AUTO: ABNORMAL /HPF

## 2019-01-01 PROCEDURE — 80048 BASIC METABOLIC PNL TOTAL CA: CPT | Performed by: NURSE PRACTITIONER

## 2019-01-01 PROCEDURE — P9612 CATHETERIZE FOR URINE SPEC: HCPCS

## 2019-01-01 PROCEDURE — 25010000002 LORAZEPAM PER 2 MG: Performed by: HOSPITALIST

## 2019-01-01 PROCEDURE — 93010 ELECTROCARDIOGRAM REPORT: CPT | Performed by: INTERNAL MEDICINE

## 2019-01-01 PROCEDURE — 97110 THERAPEUTIC EXERCISES: CPT

## 2019-01-01 PROCEDURE — 87086 URINE CULTURE/COLONY COUNT: CPT | Performed by: NURSE PRACTITIONER

## 2019-01-01 PROCEDURE — 70450 CT HEAD/BRAIN W/O DYE: CPT

## 2019-01-01 PROCEDURE — 99231 SBSQ HOSP IP/OBS SF/LOW 25: CPT | Performed by: NURSE PRACTITIONER

## 2019-01-01 PROCEDURE — 80053 COMPREHEN METABOLIC PANEL: CPT | Performed by: INTERNAL MEDICINE

## 2019-01-01 PROCEDURE — 97162 PT EVAL MOD COMPLEX 30 MIN: CPT

## 2019-01-01 PROCEDURE — 25010000002 HYDROMORPHONE PER 4 MG: Performed by: INTERNAL MEDICINE

## 2019-01-01 PROCEDURE — 85027 COMPLETE CBC AUTOMATED: CPT | Performed by: INTERNAL MEDICINE

## 2019-01-01 PROCEDURE — 84484 ASSAY OF TROPONIN QUANT: CPT | Performed by: NURSE PRACTITIONER

## 2019-01-01 PROCEDURE — G0378 HOSPITAL OBSERVATION PER HR: HCPCS

## 2019-01-01 PROCEDURE — 70498 CT ANGIOGRAPHY NECK: CPT

## 2019-01-01 PROCEDURE — 93005 ELECTROCARDIOGRAM TRACING: CPT | Performed by: PHYSICIAN ASSISTANT

## 2019-01-01 PROCEDURE — 97166 OT EVAL MOD COMPLEX 45 MIN: CPT

## 2019-01-01 PROCEDURE — 85027 COMPLETE CBC AUTOMATED: CPT | Performed by: NURSE PRACTITIONER

## 2019-01-01 PROCEDURE — 83735 ASSAY OF MAGNESIUM: CPT | Performed by: INTERNAL MEDICINE

## 2019-01-01 PROCEDURE — 87040 BLOOD CULTURE FOR BACTERIA: CPT | Performed by: NURSE PRACTITIONER

## 2019-01-01 PROCEDURE — 74018 RADEX ABDOMEN 1 VIEW: CPT

## 2019-01-01 PROCEDURE — 83690 ASSAY OF LIPASE: CPT | Performed by: PHYSICIAN ASSISTANT

## 2019-01-01 PROCEDURE — 99285 EMERGENCY DEPT VISIT HI MDM: CPT

## 2019-01-01 PROCEDURE — 81001 URINALYSIS AUTO W/SCOPE: CPT | Performed by: NURSE PRACTITIONER

## 2019-01-01 PROCEDURE — 70553 MRI BRAIN STEM W/O & W/DYE: CPT

## 2019-01-01 PROCEDURE — 93005 ELECTROCARDIOGRAM TRACING: CPT | Performed by: NURSE PRACTITIONER

## 2019-01-01 PROCEDURE — 71046 X-RAY EXAM CHEST 2 VIEWS: CPT

## 2019-01-01 PROCEDURE — 25010000002 MORPHINE PER 10 MG: Performed by: INTERNAL MEDICINE

## 2019-01-01 PROCEDURE — 87150 DNA/RNA AMPLIFIED PROBE: CPT | Performed by: NURSE PRACTITIONER

## 2019-01-01 PROCEDURE — 96361 HYDRATE IV INFUSION ADD-ON: CPT

## 2019-01-01 PROCEDURE — 25010000002 LORAZEPAM PER 2 MG: Performed by: INTERNAL MEDICINE

## 2019-01-01 PROCEDURE — 73560 X-RAY EXAM OF KNEE 1 OR 2: CPT

## 2019-01-01 PROCEDURE — 83605 ASSAY OF LACTIC ACID: CPT | Performed by: NURSE PRACTITIONER

## 2019-01-01 PROCEDURE — 85007 BL SMEAR W/DIFF WBC COUNT: CPT | Performed by: HOSPITALIST

## 2019-01-01 PROCEDURE — 84484 ASSAY OF TROPONIN QUANT: CPT | Performed by: HOSPITALIST

## 2019-01-01 PROCEDURE — 25010000002 MORPHINE PER 10 MG: Performed by: HOSPITALIST

## 2019-01-01 PROCEDURE — 0 IOPAMIDOL PER 1 ML: Performed by: HOSPITALIST

## 2019-01-01 PROCEDURE — 97161 PT EVAL LOW COMPLEX 20 MIN: CPT

## 2019-01-01 PROCEDURE — 99232 SBSQ HOSP IP/OBS MODERATE 35: CPT | Performed by: NURSE PRACTITIONER

## 2019-01-01 PROCEDURE — 87077 CULTURE AEROBIC IDENTIFY: CPT | Performed by: NURSE PRACTITIONER

## 2019-01-01 PROCEDURE — 93306 TTE W/DOPPLER COMPLETE: CPT | Performed by: INTERNAL MEDICINE

## 2019-01-01 PROCEDURE — 80048 BASIC METABOLIC PNL TOTAL CA: CPT | Performed by: INTERNAL MEDICINE

## 2019-01-01 PROCEDURE — 84484 ASSAY OF TROPONIN QUANT: CPT | Performed by: INTERNAL MEDICINE

## 2019-01-01 PROCEDURE — 80061 LIPID PANEL: CPT | Performed by: INTERNAL MEDICINE

## 2019-01-01 PROCEDURE — 81001 URINALYSIS AUTO W/SCOPE: CPT | Performed by: PHYSICIAN ASSISTANT

## 2019-01-01 PROCEDURE — 99221 1ST HOSP IP/OBS SF/LOW 40: CPT | Performed by: NURSE PRACTITIONER

## 2019-01-01 PROCEDURE — 70496 CT ANGIOGRAPHY HEAD: CPT

## 2019-01-01 PROCEDURE — 93005 ELECTROCARDIOGRAM TRACING: CPT | Performed by: INTERNAL MEDICINE

## 2019-01-01 PROCEDURE — 87186 SC STD MICRODIL/AGAR DIL: CPT | Performed by: NURSE PRACTITIONER

## 2019-01-01 PROCEDURE — 36415 COLL VENOUS BLD VENIPUNCTURE: CPT | Performed by: NURSE PRACTITIONER

## 2019-01-01 PROCEDURE — 80053 COMPREHEN METABOLIC PANEL: CPT | Performed by: EMERGENCY MEDICINE

## 2019-01-01 PROCEDURE — 85025 COMPLETE CBC W/AUTO DIFF WBC: CPT | Performed by: HOSPITALIST

## 2019-01-01 PROCEDURE — 83036 HEMOGLOBIN GLYCOSYLATED A1C: CPT | Performed by: HOSPITALIST

## 2019-01-01 PROCEDURE — 93005 ELECTROCARDIOGRAM TRACING: CPT | Performed by: EMERGENCY MEDICINE

## 2019-01-01 PROCEDURE — 83880 ASSAY OF NATRIURETIC PEPTIDE: CPT | Performed by: EMERGENCY MEDICINE

## 2019-01-01 PROCEDURE — 0 GADOBENATE DIMEGLUMINE 529 MG/ML SOLUTION: Performed by: HOSPITALIST

## 2019-01-01 PROCEDURE — 85025 COMPLETE CBC W/AUTO DIFF WBC: CPT | Performed by: EMERGENCY MEDICINE

## 2019-01-01 PROCEDURE — 96360 HYDRATION IV INFUSION INIT: CPT

## 2019-01-01 PROCEDURE — 25010000003 CEFTRIAXONE PER 250 MG: Performed by: HOSPITALIST

## 2019-01-01 PROCEDURE — 0T9B70Z DRAINAGE OF BLADDER WITH DRAINAGE DEVICE, VIA NATURAL OR ARTIFICIAL OPENING: ICD-10-PCS | Performed by: EMERGENCY MEDICINE

## 2019-01-01 PROCEDURE — 80053 COMPREHEN METABOLIC PANEL: CPT | Performed by: PHYSICIAN ASSISTANT

## 2019-01-01 PROCEDURE — 25810000003 SODIUM CHLORIDE 0.9 % WITH KCL 20 MEQ 20-0.9 MEQ/L-% SOLUTION: Performed by: INTERNAL MEDICINE

## 2019-01-01 PROCEDURE — 85007 BL SMEAR W/DIFF WBC COUNT: CPT | Performed by: NURSE PRACTITIONER

## 2019-01-01 PROCEDURE — 84443 ASSAY THYROID STIM HORMONE: CPT | Performed by: INTERNAL MEDICINE

## 2019-01-01 PROCEDURE — 72072 X-RAY EXAM THORAC SPINE 3VWS: CPT

## 2019-01-01 PROCEDURE — 99213 OFFICE O/P EST LOW 20 MIN: CPT | Performed by: NURSE PRACTITIONER

## 2019-01-01 PROCEDURE — 36415 COLL VENOUS BLD VENIPUNCTURE: CPT | Performed by: PHYSICIAN ASSISTANT

## 2019-01-01 PROCEDURE — 82962 GLUCOSE BLOOD TEST: CPT

## 2019-01-01 PROCEDURE — 99284 EMERGENCY DEPT VISIT MOD MDM: CPT

## 2019-01-01 PROCEDURE — A9577 INJ MULTIHANCE: HCPCS | Performed by: HOSPITALIST

## 2019-01-01 PROCEDURE — 80053 COMPREHEN METABOLIC PANEL: CPT | Performed by: NURSE PRACTITIONER

## 2019-01-01 PROCEDURE — 85025 COMPLETE CBC W/AUTO DIFF WBC: CPT | Performed by: PHYSICIAN ASSISTANT

## 2019-01-01 PROCEDURE — 25810000003 SODIUM CHLORIDE 0.9 % WITH KCL 20 MEQ 20-0.9 MEQ/L-% SOLUTION: Performed by: HOSPITALIST

## 2019-01-01 PROCEDURE — 85025 COMPLETE CBC W/AUTO DIFF WBC: CPT | Performed by: NURSE PRACTITIONER

## 2019-01-01 PROCEDURE — 25010000002 FUROSEMIDE PER 20 MG: Performed by: NURSE PRACTITIONER

## 2019-01-01 PROCEDURE — 84484 ASSAY OF TROPONIN QUANT: CPT | Performed by: EMERGENCY MEDICINE

## 2019-01-01 PROCEDURE — 82330 ASSAY OF CALCIUM: CPT | Performed by: INTERNAL MEDICINE

## 2019-01-01 PROCEDURE — 51702 INSERT TEMP BLADDER CATH: CPT

## 2019-01-01 PROCEDURE — 93005 ELECTROCARDIOGRAM TRACING: CPT | Performed by: HOSPITALIST

## 2019-01-01 PROCEDURE — 74176 CT ABD & PELVIS W/O CONTRAST: CPT

## 2019-01-01 PROCEDURE — 85027 COMPLETE CBC AUTOMATED: CPT | Performed by: HOSPITALIST

## 2019-01-01 PROCEDURE — 87147 CULTURE TYPE IMMUNOLOGIC: CPT | Performed by: NURSE PRACTITIONER

## 2019-01-01 PROCEDURE — 97535 SELF CARE MNGMENT TRAINING: CPT

## 2019-01-01 PROCEDURE — 80048 BASIC METABOLIC PNL TOTAL CA: CPT | Performed by: EMERGENCY MEDICINE

## 2019-01-01 PROCEDURE — 99203 OFFICE O/P NEW LOW 30 MIN: CPT | Performed by: FAMILY MEDICINE

## 2019-01-01 PROCEDURE — 83036 HEMOGLOBIN GLYCOSYLATED A1C: CPT | Performed by: INTERNAL MEDICINE

## 2019-01-01 PROCEDURE — 97530 THERAPEUTIC ACTIVITIES: CPT

## 2019-01-01 PROCEDURE — 87040 BLOOD CULTURE FOR BACTERIA: CPT | Performed by: HOSPITALIST

## 2019-01-01 PROCEDURE — 84484 ASSAY OF TROPONIN QUANT: CPT | Performed by: PHYSICIAN ASSISTANT

## 2019-01-01 PROCEDURE — 80061 LIPID PANEL: CPT | Performed by: HOSPITALIST

## 2019-01-01 PROCEDURE — 80048 BASIC METABOLIC PNL TOTAL CA: CPT | Performed by: HOSPITALIST

## 2019-01-01 PROCEDURE — 99205 OFFICE O/P NEW HI 60 MIN: CPT | Performed by: PSYCHIATRY & NEUROLOGY

## 2019-01-01 PROCEDURE — 83735 ASSAY OF MAGNESIUM: CPT | Performed by: NURSE PRACTITIONER

## 2019-01-01 PROCEDURE — 85007 BL SMEAR W/DIFF WBC COUNT: CPT | Performed by: EMERGENCY MEDICINE

## 2019-01-01 PROCEDURE — 71045 X-RAY EXAM CHEST 1 VIEW: CPT

## 2019-01-01 PROCEDURE — 83880 ASSAY OF NATRIURETIC PEPTIDE: CPT | Performed by: HOSPITALIST

## 2019-01-01 PROCEDURE — 25010000002 CEFTRIAXONE PER 250 MG: Performed by: NURSE PRACTITIONER

## 2019-01-01 PROCEDURE — 99203 OFFICE O/P NEW LOW 30 MIN: CPT | Performed by: PHYSICIAN ASSISTANT

## 2019-01-01 PROCEDURE — 84439 ASSAY OF FREE THYROXINE: CPT | Performed by: HOSPITALIST

## 2019-01-01 PROCEDURE — 84443 ASSAY THYROID STIM HORMONE: CPT | Performed by: HOSPITALIST

## 2019-01-01 PROCEDURE — 84145 PROCALCITONIN (PCT): CPT | Performed by: NURSE PRACTITIONER

## 2019-01-01 PROCEDURE — 72110 X-RAY EXAM L-2 SPINE 4/>VWS: CPT

## 2019-01-01 PROCEDURE — 70544 MR ANGIOGRAPHY HEAD W/O DYE: CPT

## 2019-01-01 PROCEDURE — 93306 TTE W/DOPPLER COMPLETE: CPT

## 2019-01-01 PROCEDURE — 80053 COMPREHEN METABOLIC PANEL: CPT | Performed by: HOSPITALIST

## 2019-01-01 PROCEDURE — 83970 ASSAY OF PARATHORMONE: CPT | Performed by: HOSPITALIST

## 2019-01-01 PROCEDURE — 94799 UNLISTED PULMONARY SVC/PX: CPT

## 2019-01-01 PROCEDURE — 36415 COLL VENOUS BLD VENIPUNCTURE: CPT

## 2019-01-01 PROCEDURE — 70549 MR ANGIOGRAPH NECK W/O&W/DYE: CPT

## 2019-01-01 RX ORDER — PANTOPRAZOLE SODIUM 40 MG/1
40 TABLET, DELAYED RELEASE ORAL
Qty: 30 TABLET | Refills: 0 | Status: ON HOLD | OUTPATIENT
Start: 2019-01-01 | End: 2019-01-01

## 2019-01-01 RX ORDER — HYDROMORPHONE HYDROCHLORIDE 1 MG/ML
0.5 INJECTION, SOLUTION INTRAMUSCULAR; INTRAVENOUS; SUBCUTANEOUS
Status: DISCONTINUED | OUTPATIENT
Start: 2019-01-01 | End: 2019-01-01 | Stop reason: HOSPADM

## 2019-01-01 RX ORDER — ATORVASTATIN CALCIUM 40 MG/1
40 TABLET, FILM COATED ORAL NIGHTLY
COMMUNITY
End: 2019-01-01 | Stop reason: HOSPADM

## 2019-01-01 RX ORDER — ONDANSETRON 2 MG/ML
4 INJECTION INTRAMUSCULAR; INTRAVENOUS EVERY 6 HOURS PRN
Status: DISCONTINUED | OUTPATIENT
Start: 2019-01-01 | End: 2019-01-01 | Stop reason: HOSPADM

## 2019-01-01 RX ORDER — ACETAMINOPHEN 650 MG/1
650 SUPPOSITORY RECTAL EVERY 4 HOURS PRN
Status: CANCELLED | OUTPATIENT
Start: 2019-01-01

## 2019-01-01 RX ORDER — LORAZEPAM 2 MG/ML
1 CONCENTRATE ORAL
Status: DISCONTINUED | OUTPATIENT
Start: 2019-01-01 | End: 2019-01-01 | Stop reason: HOSPADM

## 2019-01-01 RX ORDER — METOPROLOL TARTRATE 50 MG/1
50 TABLET, FILM COATED ORAL EVERY 12 HOURS SCHEDULED
Status: DISCONTINUED | OUTPATIENT
Start: 2019-01-01 | End: 2019-01-01

## 2019-01-01 RX ORDER — ONDANSETRON 4 MG/1
4 TABLET, FILM COATED ORAL EVERY 6 HOURS PRN
Status: DISCONTINUED | OUTPATIENT
Start: 2019-01-01 | End: 2019-01-01

## 2019-01-01 RX ORDER — LORAZEPAM 2 MG/ML
0.5 CONCENTRATE ORAL
Status: CANCELLED | OUTPATIENT
Start: 2019-01-01 | End: 2019-12-17

## 2019-01-01 RX ORDER — MORPHINE SULFATE 10 MG/ML
6 INJECTION INTRAMUSCULAR; INTRAVENOUS; SUBCUTANEOUS
Status: DISCONTINUED | OUTPATIENT
Start: 2019-01-01 | End: 2019-01-01 | Stop reason: HOSPADM

## 2019-01-01 RX ORDER — HYDROMORPHONE HYDROCHLORIDE 1 MG/ML
0.5 INJECTION, SOLUTION INTRAMUSCULAR; INTRAVENOUS; SUBCUTANEOUS
Status: CANCELLED | OUTPATIENT
Start: 2019-01-01 | End: 2019-12-17

## 2019-01-01 RX ORDER — ACETAMINOPHEN 325 MG/1
650 TABLET ORAL EVERY 4 HOURS PRN
Status: DISCONTINUED | OUTPATIENT
Start: 2019-01-01 | End: 2019-01-01 | Stop reason: HOSPADM

## 2019-01-01 RX ORDER — ATORVASTATIN CALCIUM 20 MG/1
20 TABLET, FILM COATED ORAL DAILY
Qty: 30 TABLET | Refills: 0 | Status: SHIPPED | OUTPATIENT
Start: 2019-01-01 | End: 2019-01-01

## 2019-01-01 RX ORDER — SODIUM CHLORIDE 9 MG/ML
75 INJECTION, SOLUTION INTRAVENOUS CONTINUOUS
Status: DISCONTINUED | OUTPATIENT
Start: 2019-01-01 | End: 2019-01-01 | Stop reason: HOSPADM

## 2019-01-01 RX ORDER — ASPIRIN 325 MG
325 TABLET ORAL DAILY
Status: DISCONTINUED | OUTPATIENT
Start: 2019-01-01 | End: 2019-01-01

## 2019-01-01 RX ORDER — ACETAMINOPHEN 650 MG/1
650 SUPPOSITORY RECTAL EVERY 4 HOURS PRN
Status: DISCONTINUED | OUTPATIENT
Start: 2019-01-01 | End: 2019-01-01 | Stop reason: HOSPADM

## 2019-01-01 RX ORDER — MORPHINE SULFATE 20 MG/ML
20 SOLUTION ORAL
Status: DISCONTINUED | OUTPATIENT
Start: 2019-01-01 | End: 2019-01-01 | Stop reason: HOSPADM

## 2019-01-01 RX ORDER — PANTOPRAZOLE SODIUM 40 MG/1
40 TABLET, DELAYED RELEASE ORAL
Status: DISCONTINUED | OUTPATIENT
Start: 2019-01-01 | End: 2019-01-01

## 2019-01-01 RX ORDER — ACETAMINOPHEN 325 MG/1
650 TABLET ORAL EVERY 6 HOURS PRN
Status: DISCONTINUED | OUTPATIENT
Start: 2019-01-01 | End: 2019-01-01 | Stop reason: HOSPADM

## 2019-01-01 RX ORDER — ACETAMINOPHEN 160 MG/5ML
650 SOLUTION ORAL EVERY 4 HOURS PRN
Status: DISCONTINUED | OUTPATIENT
Start: 2019-01-01 | End: 2019-01-01 | Stop reason: HOSPADM

## 2019-01-01 RX ORDER — DOCUSATE SODIUM 100 MG/1
100 CAPSULE, LIQUID FILLED ORAL 2 TIMES DAILY PRN
Status: DISCONTINUED | OUTPATIENT
Start: 2019-01-01 | End: 2019-01-01 | Stop reason: HOSPADM

## 2019-01-01 RX ORDER — HYDROMORPHONE HCL 110MG/55ML
1.5 PATIENT CONTROLLED ANALGESIA SYRINGE INTRAVENOUS
Status: DISCONTINUED | OUTPATIENT
Start: 2019-01-01 | End: 2019-01-01 | Stop reason: HOSPADM

## 2019-01-01 RX ORDER — NITROGLYCERIN 0.4 MG/1
0.4 TABLET SUBLINGUAL
Status: DISCONTINUED | OUTPATIENT
Start: 2019-01-01 | End: 2019-01-01

## 2019-01-01 RX ORDER — HYDROMORPHONE HYDROCHLORIDE 1 MG/ML
1 INJECTION, SOLUTION INTRAMUSCULAR; INTRAVENOUS; SUBCUTANEOUS
Status: CANCELLED | OUTPATIENT
Start: 2019-01-01 | End: 2019-12-17

## 2019-01-01 RX ORDER — ASPIRIN 81 MG/1
81 TABLET, CHEWABLE ORAL DAILY
Status: DISCONTINUED | OUTPATIENT
Start: 2019-01-01 | End: 2019-01-01 | Stop reason: HOSPADM

## 2019-01-01 RX ORDER — SODIUM CHLORIDE 0.9 % (FLUSH) 0.9 %
10 SYRINGE (ML) INJECTION EVERY 12 HOURS SCHEDULED
Status: DISCONTINUED | OUTPATIENT
Start: 2019-01-01 | End: 2019-01-01

## 2019-01-01 RX ORDER — LORAZEPAM 2 MG/ML
0.5 INJECTION INTRAMUSCULAR
Status: CANCELLED | OUTPATIENT
Start: 2019-01-01 | End: 2019-12-17

## 2019-01-01 RX ORDER — AMLODIPINE BESYLATE 10 MG/1
10 TABLET ORAL
Status: DISCONTINUED | OUTPATIENT
Start: 2019-01-01 | End: 2019-01-01

## 2019-01-01 RX ORDER — LORAZEPAM 2 MG/ML
2 CONCENTRATE ORAL
Status: DISCONTINUED | OUTPATIENT
Start: 2019-01-01 | End: 2019-01-01 | Stop reason: HOSPADM

## 2019-01-01 RX ORDER — POLYETHYLENE GLYCOL 3350 17 G/17G
17 POWDER, FOR SOLUTION ORAL 2 TIMES DAILY
Status: DISCONTINUED | OUTPATIENT
Start: 2019-01-01 | End: 2019-01-01 | Stop reason: HOSPADM

## 2019-01-01 RX ORDER — LORAZEPAM 2 MG/ML
0.5 CONCENTRATE ORAL
Status: DISCONTINUED | OUTPATIENT
Start: 2019-01-01 | End: 2019-01-01 | Stop reason: HOSPADM

## 2019-01-01 RX ORDER — BISACODYL 10 MG
10 SUPPOSITORY, RECTAL RECTAL DAILY
Status: DISCONTINUED | OUTPATIENT
Start: 2019-01-01 | End: 2019-01-01 | Stop reason: HOSPADM

## 2019-01-01 RX ORDER — FUROSEMIDE 10 MG/ML
20 INJECTION INTRAMUSCULAR; INTRAVENOUS ONCE
Status: COMPLETED | OUTPATIENT
Start: 2019-01-01 | End: 2019-01-01

## 2019-01-01 RX ORDER — DIPHENOXYLATE HYDROCHLORIDE AND ATROPINE SULFATE 2.5; .025 MG/1; MG/1
1 TABLET ORAL
Status: DISCONTINUED | OUTPATIENT
Start: 2019-01-01 | End: 2019-01-01 | Stop reason: HOSPADM

## 2019-01-01 RX ORDER — POLYETHYLENE GLYCOL 3350 17 G/17G
17 POWDER, FOR SOLUTION ORAL DAILY
Status: DISCONTINUED | OUTPATIENT
Start: 2019-01-01 | End: 2019-01-01

## 2019-01-01 RX ORDER — CEFTRIAXONE SODIUM 2 G/50ML
2 INJECTION, SOLUTION INTRAVENOUS EVERY 24 HOURS
Status: DISCONTINUED | OUTPATIENT
Start: 2019-01-01 | End: 2019-01-01

## 2019-01-01 RX ORDER — MORPHINE SULFATE 20 MG/ML
5 SOLUTION ORAL
Status: DISCONTINUED | OUTPATIENT
Start: 2019-01-01 | End: 2019-01-01 | Stop reason: HOSPADM

## 2019-01-01 RX ORDER — LORAZEPAM 2 MG/ML
0.5 INJECTION INTRAMUSCULAR
Status: DISCONTINUED | OUTPATIENT
Start: 2019-01-01 | End: 2019-01-01 | Stop reason: HOSPADM

## 2019-01-01 RX ORDER — LORAZEPAM 2 MG/ML
2 CONCENTRATE ORAL
Status: CANCELLED | OUTPATIENT
Start: 2019-01-01 | End: 2019-12-17

## 2019-01-01 RX ORDER — MULTIPLE VITAMINS W/ MINERALS TAB 9MG-400MCG
1 TAB ORAL DAILY
Status: DISCONTINUED | OUTPATIENT
Start: 2019-01-01 | End: 2019-01-01

## 2019-01-01 RX ORDER — PROMETHAZINE HYDROCHLORIDE 25 MG/ML
6.25 INJECTION, SOLUTION INTRAMUSCULAR; INTRAVENOUS EVERY 4 HOURS PRN
Status: DISCONTINUED | OUTPATIENT
Start: 2019-01-01 | End: 2019-01-01 | Stop reason: HOSPADM

## 2019-01-01 RX ORDER — FLUTICASONE PROPIONATE 50 MCG
1 SPRAY, SUSPENSION (ML) NASAL DAILY PRN
Status: DISCONTINUED | OUTPATIENT
Start: 2019-01-01 | End: 2019-01-01 | Stop reason: HOSPADM

## 2019-01-01 RX ORDER — BRIMONIDINE TARTRATE 0.1 %
1 DROPS OPHTHALMIC (EYE) 2 TIMES DAILY
Status: ON HOLD | COMMUNITY
Start: 2019-01-01 | End: 2019-01-01

## 2019-01-01 RX ORDER — MORPHINE SULFATE 4 MG/ML
4 INJECTION, SOLUTION INTRAMUSCULAR; INTRAVENOUS
Status: DISCONTINUED | OUTPATIENT
Start: 2019-01-01 | End: 2019-01-01 | Stop reason: HOSPADM

## 2019-01-01 RX ORDER — ATORVASTATIN CALCIUM 10 MG/1
10 TABLET, FILM COATED ORAL NIGHTLY
Status: DISCONTINUED | OUTPATIENT
Start: 2019-01-01 | End: 2019-01-01

## 2019-01-01 RX ORDER — PROMETHAZINE HYDROCHLORIDE 12.5 MG/1
6.25 SUPPOSITORY RECTAL EVERY 4 HOURS PRN
Status: DISCONTINUED | OUTPATIENT
Start: 2019-01-01 | End: 2019-01-01 | Stop reason: HOSPADM

## 2019-01-01 RX ORDER — LORAZEPAM 2 MG/ML
1 INJECTION INTRAMUSCULAR
Status: CANCELLED | OUTPATIENT
Start: 2019-01-01 | End: 2019-12-17

## 2019-01-01 RX ORDER — HYDROMORPHONE HYDROCHLORIDE 1 MG/ML
1 INJECTION, SOLUTION INTRAMUSCULAR; INTRAVENOUS; SUBCUTANEOUS
Status: DISCONTINUED | OUTPATIENT
Start: 2019-01-01 | End: 2019-01-01 | Stop reason: HOSPADM

## 2019-01-01 RX ORDER — MORPHINE SULFATE 10 MG/ML
6 INJECTION INTRAMUSCULAR; INTRAVENOUS; SUBCUTANEOUS
Status: CANCELLED | OUTPATIENT
Start: 2019-01-01 | End: 2019-12-17

## 2019-01-01 RX ORDER — MORPHINE SULFATE 20 MG/ML
10 SOLUTION ORAL
Status: DISCONTINUED | OUTPATIENT
Start: 2019-01-01 | End: 2019-01-01 | Stop reason: HOSPADM

## 2019-01-01 RX ORDER — MORPHINE SULFATE 20 MG/ML
10 SOLUTION ORAL
Status: CANCELLED | OUTPATIENT
Start: 2019-01-01 | End: 2019-12-17

## 2019-01-01 RX ORDER — MORPHINE SULFATE 4 MG/ML
4 INJECTION, SOLUTION INTRAMUSCULAR; INTRAVENOUS
Status: CANCELLED | OUTPATIENT
Start: 2019-01-01 | End: 2019-12-17

## 2019-01-01 RX ORDER — ISOSORBIDE DINITRATE 10 MG/1
10 TABLET ORAL
Status: DISCONTINUED | OUTPATIENT
Start: 2019-01-01 | End: 2019-01-01

## 2019-01-01 RX ORDER — PANTOPRAZOLE SODIUM 40 MG/1
40 TABLET, DELAYED RELEASE ORAL
Status: DISCONTINUED | OUTPATIENT
Start: 2019-01-01 | End: 2019-01-01 | Stop reason: HOSPADM

## 2019-01-01 RX ORDER — QUINIDINE GLUCONATE 324 MG
240 TABLET, EXTENDED RELEASE ORAL 2 TIMES DAILY
Status: ON HOLD | COMMUNITY
End: 2019-01-01

## 2019-01-01 RX ORDER — ASPIRIN 81 MG/1
81 TABLET ORAL DAILY
Status: DISCONTINUED | OUTPATIENT
Start: 2019-01-01 | End: 2019-01-01 | Stop reason: HOSPADM

## 2019-01-01 RX ORDER — MORPHINE SULFATE 2 MG/ML
2 INJECTION, SOLUTION INTRAMUSCULAR; INTRAVENOUS
Status: DISCONTINUED | OUTPATIENT
Start: 2019-01-01 | End: 2019-01-01 | Stop reason: HOSPADM

## 2019-01-01 RX ORDER — ATORVASTATIN CALCIUM 20 MG/1
20 TABLET, FILM COATED ORAL DAILY
Status: DISCONTINUED | OUTPATIENT
Start: 2019-01-01 | End: 2019-01-01 | Stop reason: HOSPADM

## 2019-01-01 RX ORDER — SODIUM CHLORIDE AND POTASSIUM CHLORIDE 150; 900 MG/100ML; MG/100ML
75 INJECTION, SOLUTION INTRAVENOUS CONTINUOUS
Status: DISCONTINUED | OUTPATIENT
Start: 2019-01-01 | End: 2019-01-01

## 2019-01-01 RX ORDER — CEFTRIAXONE SODIUM 1 G/50ML
1 INJECTION, SOLUTION INTRAVENOUS EVERY 24 HOURS
Status: DISCONTINUED | OUTPATIENT
Start: 2019-01-01 | End: 2019-01-01

## 2019-01-01 RX ORDER — SODIUM CHLORIDE 0.9 % (FLUSH) 0.9 %
10 SYRINGE (ML) INJECTION AS NEEDED
Status: DISCONTINUED | OUTPATIENT
Start: 2019-01-01 | End: 2019-01-01

## 2019-01-01 RX ORDER — AMLODIPINE BESYLATE 5 MG/1
5 TABLET ORAL
Status: DISCONTINUED | OUTPATIENT
Start: 2019-01-01 | End: 2019-01-01

## 2019-01-01 RX ORDER — BISACODYL 10 MG
10 SUPPOSITORY, RECTAL RECTAL DAILY PRN
Status: DISCONTINUED | OUTPATIENT
Start: 2019-01-01 | End: 2019-01-01 | Stop reason: HOSPADM

## 2019-01-01 RX ORDER — ACETAMINOPHEN 160 MG/5ML
650 SOLUTION ORAL EVERY 4 HOURS PRN
Status: CANCELLED | OUTPATIENT
Start: 2019-01-01

## 2019-01-01 RX ORDER — MORPHINE SULFATE 2 MG/ML
2 INJECTION, SOLUTION INTRAMUSCULAR; INTRAVENOUS
Status: CANCELLED | OUTPATIENT
Start: 2019-01-01 | End: 2019-12-17

## 2019-01-01 RX ORDER — CEFTRIAXONE SODIUM 1 G/50ML
1 INJECTION, SOLUTION INTRAVENOUS ONCE
Status: COMPLETED | OUTPATIENT
Start: 2019-01-01 | End: 2019-01-01

## 2019-01-01 RX ORDER — MORPHINE SULFATE 20 MG/ML
20 SOLUTION ORAL
Status: CANCELLED | OUTPATIENT
Start: 2019-01-01 | End: 2019-12-17

## 2019-01-01 RX ORDER — HYDROMORPHONE HCL 110MG/55ML
1.5 PATIENT CONTROLLED ANALGESIA SYRINGE INTRAVENOUS
Status: CANCELLED | OUTPATIENT
Start: 2019-01-01 | End: 2019-12-17

## 2019-01-01 RX ORDER — SCOLOPAMINE TRANSDERMAL SYSTEM 1 MG/1
1 PATCH, EXTENDED RELEASE TRANSDERMAL
Status: DISCONTINUED | OUTPATIENT
Start: 2019-01-01 | End: 2019-01-01 | Stop reason: HOSPADM

## 2019-01-01 RX ORDER — LORAZEPAM 2 MG/ML
1 INJECTION INTRAMUSCULAR
Status: DISCONTINUED | OUTPATIENT
Start: 2019-01-01 | End: 2019-01-01 | Stop reason: HOSPADM

## 2019-01-01 RX ORDER — ONDANSETRON 4 MG/1
4 TABLET, FILM COATED ORAL EVERY 6 HOURS PRN
Qty: 30 TABLET | Refills: 0 | Status: ON HOLD | OUTPATIENT
Start: 2019-01-01 | End: 2019-01-01

## 2019-01-01 RX ORDER — SODIUM CHLORIDE 9 MG/ML
50 INJECTION, SOLUTION INTRAVENOUS CONTINUOUS
Status: DISCONTINUED | OUTPATIENT
Start: 2019-01-01 | End: 2019-01-01 | Stop reason: HOSPADM

## 2019-01-01 RX ORDER — ASPIRIN 81 MG/1
324 TABLET, CHEWABLE ORAL ONCE
Status: COMPLETED | OUTPATIENT
Start: 2019-01-01 | End: 2019-01-01

## 2019-01-01 RX ORDER — ATORVASTATIN CALCIUM 80 MG/1
80 TABLET, FILM COATED ORAL DAILY
Status: DISCONTINUED | OUTPATIENT
Start: 2019-01-01 | End: 2019-01-01

## 2019-01-01 RX ORDER — CEPHALEXIN 500 MG/1
500 CAPSULE ORAL 2 TIMES DAILY
Qty: 10 CAPSULE | Refills: 0 | Status: SHIPPED | OUTPATIENT
Start: 2019-01-01 | End: 2019-01-01

## 2019-01-01 RX ORDER — ASPIRIN 81 MG/1
81 TABLET, CHEWABLE ORAL DAILY
Qty: 30 TABLET | Refills: 0 | Status: SHIPPED | OUTPATIENT
Start: 2019-01-01 | End: 2019-01-01

## 2019-01-01 RX ORDER — PROMETHAZINE HYDROCHLORIDE 25 MG/1
6.25 TABLET ORAL EVERY 4 HOURS PRN
Status: DISCONTINUED | OUTPATIENT
Start: 2019-01-01 | End: 2019-01-01 | Stop reason: HOSPADM

## 2019-01-01 RX ORDER — MORPHINE SULFATE 2 MG/ML
4 INJECTION, SOLUTION INTRAMUSCULAR; INTRAVENOUS
Status: DISCONTINUED | OUTPATIENT
Start: 2019-01-01 | End: 2019-01-01 | Stop reason: HOSPADM

## 2019-01-01 RX ORDER — LORAZEPAM 2 MG/ML
2 INJECTION INTRAMUSCULAR
Status: DISCONTINUED | OUTPATIENT
Start: 2019-01-01 | End: 2019-01-01 | Stop reason: HOSPADM

## 2019-01-01 RX ORDER — ACETAMINOPHEN 500 MG
1000 TABLET ORAL EVERY 8 HOURS PRN
Qty: 60 TABLET | Refills: 0 | Status: ON HOLD | OUTPATIENT
Start: 2019-01-01 | End: 2019-01-01

## 2019-01-01 RX ORDER — MORPHINE SULFATE 20 MG/ML
5 SOLUTION ORAL
Status: CANCELLED | OUTPATIENT
Start: 2019-01-01 | End: 2019-12-17

## 2019-01-01 RX ORDER — SODIUM CHLORIDE 0.9 % (FLUSH) 0.9 %
3 SYRINGE (ML) INJECTION EVERY 12 HOURS SCHEDULED
Status: DISCONTINUED | OUTPATIENT
Start: 2019-01-01 | End: 2019-01-01 | Stop reason: HOSPADM

## 2019-01-01 RX ORDER — SCOLOPAMINE TRANSDERMAL SYSTEM 1 MG/1
1 PATCH, EXTENDED RELEASE TRANSDERMAL
Status: CANCELLED | OUTPATIENT
Start: 2019-01-01

## 2019-01-01 RX ORDER — SENNA AND DOCUSATE SODIUM 50; 8.6 MG/1; MG/1
2 TABLET, FILM COATED ORAL NIGHTLY
Status: DISCONTINUED | OUTPATIENT
Start: 2019-01-01 | End: 2019-01-01

## 2019-01-01 RX ORDER — NITROGLYCERIN 0.4 MG/1
0.4 TABLET SUBLINGUAL
Status: DISCONTINUED | OUTPATIENT
Start: 2019-01-01 | End: 2019-01-01 | Stop reason: HOSPADM

## 2019-01-01 RX ORDER — ATORVASTATIN CALCIUM 20 MG/1
40 TABLET, FILM COATED ORAL NIGHTLY
Status: DISCONTINUED | OUTPATIENT
Start: 2019-01-01 | End: 2019-01-01

## 2019-01-01 RX ORDER — FAMOTIDINE 20 MG/1
20 TABLET, FILM COATED ORAL
Status: DISCONTINUED | OUTPATIENT
Start: 2019-01-01 | End: 2019-01-01

## 2019-01-01 RX ORDER — DIPHENOXYLATE HYDROCHLORIDE AND ATROPINE SULFATE 2.5; .025 MG/1; MG/1
1 TABLET ORAL
Status: CANCELLED | OUTPATIENT
Start: 2019-01-01

## 2019-01-01 RX ORDER — SODIUM CHLORIDE 0.9 % (FLUSH) 0.9 %
1-10 SYRINGE (ML) INJECTION AS NEEDED
Status: DISCONTINUED | OUTPATIENT
Start: 2019-01-01 | End: 2019-01-01 | Stop reason: HOSPADM

## 2019-01-01 RX ORDER — SODIUM CHLORIDE 0.9 % (FLUSH) 0.9 %
10 SYRINGE (ML) INJECTION AS NEEDED
Status: DISCONTINUED | OUTPATIENT
Start: 2019-01-01 | End: 2019-01-01 | Stop reason: HOSPADM

## 2019-01-01 RX ORDER — AMLODIPINE BESYLATE AND BENAZEPRIL HYDROCHLORIDE 2.5; 1 MG/1; MG/1
1 CAPSULE ORAL DAILY
COMMUNITY
End: 2019-01-01

## 2019-01-01 RX ORDER — BISACODYL 5 MG/1
5 TABLET, DELAYED RELEASE ORAL DAILY PRN
Status: DISCONTINUED | OUTPATIENT
Start: 2019-01-01 | End: 2019-01-01 | Stop reason: HOSPADM

## 2019-01-01 RX ORDER — SENNA AND DOCUSATE SODIUM 50; 8.6 MG/1; MG/1
2 TABLET, FILM COATED ORAL 2 TIMES DAILY
Status: DISCONTINUED | OUTPATIENT
Start: 2019-01-01 | End: 2019-01-01 | Stop reason: HOSPADM

## 2019-01-01 RX ORDER — ASPIRIN 81 MG/1
81 TABLET, CHEWABLE ORAL DAILY
Status: DISCONTINUED | OUTPATIENT
Start: 2019-01-01 | End: 2019-01-01

## 2019-01-01 RX ORDER — ASPIRIN 300 MG/1
300 SUPPOSITORY RECTAL DAILY
Status: DISCONTINUED | OUTPATIENT
Start: 2019-01-01 | End: 2019-01-01

## 2019-01-01 RX ORDER — FLUTICASONE PROPIONATE 50 MCG
1 SPRAY, SUSPENSION (ML) NASAL DAILY PRN
Status: ON HOLD | COMMUNITY
End: 2019-01-01

## 2019-01-01 RX ORDER — ACETAMINOPHEN 325 MG/1
650 TABLET ORAL EVERY 4 HOURS PRN
Status: CANCELLED | OUTPATIENT
Start: 2019-01-01

## 2019-01-01 RX ORDER — SODIUM CHLORIDE 0.9 % (FLUSH) 0.9 %
10 SYRINGE (ML) INJECTION EVERY 12 HOURS SCHEDULED
Status: DISCONTINUED | OUTPATIENT
Start: 2019-01-01 | End: 2019-01-01 | Stop reason: HOSPADM

## 2019-01-01 RX ORDER — LORAZEPAM 2 MG/ML
1 CONCENTRATE ORAL
Status: CANCELLED | OUTPATIENT
Start: 2019-01-01 | End: 2019-12-17

## 2019-01-01 RX ORDER — SODIUM CHLORIDE 450 MG/100ML
125 INJECTION, SOLUTION INTRAVENOUS CONTINUOUS
Status: DISCONTINUED | OUTPATIENT
Start: 2019-01-01 | End: 2019-01-01

## 2019-01-01 RX ORDER — ONDANSETRON 4 MG/1
4 TABLET, FILM COATED ORAL EVERY 6 HOURS PRN
Status: DISCONTINUED | OUTPATIENT
Start: 2019-01-01 | End: 2019-01-01 | Stop reason: HOSPADM

## 2019-01-01 RX ORDER — PROMETHAZINE HYDROCHLORIDE 6.25 MG/5ML
6.25 SYRUP ORAL EVERY 4 HOURS PRN
Status: DISCONTINUED | OUTPATIENT
Start: 2019-01-01 | End: 2019-01-01 | Stop reason: HOSPADM

## 2019-01-01 RX ORDER — LORAZEPAM 2 MG/ML
2 INJECTION INTRAMUSCULAR
Status: CANCELLED | OUTPATIENT
Start: 2019-01-01 | End: 2019-12-17

## 2019-01-01 RX ADMIN — SODIUM CHLORIDE, PRESERVATIVE FREE 10 ML: 5 INJECTION INTRAVENOUS at 11:00

## 2019-01-01 RX ADMIN — PANTOPRAZOLE SODIUM 40 MG: 40 TABLET, DELAYED RELEASE ORAL at 18:39

## 2019-01-01 RX ADMIN — MORPHINE SULFATE 2 MG: 2 INJECTION, SOLUTION INTRAMUSCULAR; INTRAVENOUS at 10:49

## 2019-01-01 RX ADMIN — LORAZEPAM 1 MG: 2 INJECTION INTRAMUSCULAR; INTRAVENOUS at 12:43

## 2019-01-01 RX ADMIN — GLYCOPYRROLATE 0.4 MG: 0.2 INJECTION, SOLUTION INTRAMUSCULAR; INTRAVITREAL at 04:28

## 2019-01-01 RX ADMIN — SENNOSIDES AND DOCUSATE SODIUM 2 TABLET: 8.6; 5 TABLET ORAL at 09:40

## 2019-01-01 RX ADMIN — MORPHINE SULFATE 2 MG: 2 INJECTION, SOLUTION INTRAMUSCULAR; INTRAVENOUS at 20:14

## 2019-01-01 RX ADMIN — LORAZEPAM 1 MG: 2 INJECTION INTRAMUSCULAR; INTRAVENOUS at 20:14

## 2019-01-01 RX ADMIN — LORAZEPAM 0.5 MG: 2 INJECTION INTRAMUSCULAR; INTRAVENOUS at 10:20

## 2019-01-01 RX ADMIN — LORAZEPAM 1 MG: 2 INJECTION INTRAMUSCULAR; INTRAVENOUS at 12:29

## 2019-01-01 RX ADMIN — GLYCOPYRROLATE 0.4 MG: 0.2 INJECTION, SOLUTION INTRAMUSCULAR; INTRAVITREAL at 05:08

## 2019-01-01 RX ADMIN — LORAZEPAM 1 MG: 2 INJECTION INTRAMUSCULAR; INTRAVENOUS at 01:18

## 2019-01-01 RX ADMIN — SODIUM CHLORIDE 75 ML/HR: 9 INJECTION, SOLUTION INTRAVENOUS at 20:38

## 2019-01-01 RX ADMIN — GLYCOPYRROLATE 0.4 MG: 0.2 INJECTION, SOLUTION INTRAMUSCULAR; INTRAVITREAL at 20:31

## 2019-01-01 RX ADMIN — SENNOSIDES AND DOCUSATE SODIUM 2 TABLET: 8.6; 5 TABLET ORAL at 10:59

## 2019-01-01 RX ADMIN — LORAZEPAM 1 MG: 2 INJECTION INTRAMUSCULAR; INTRAVENOUS at 13:43

## 2019-01-01 RX ADMIN — LORAZEPAM 1 MG: 2 INJECTION INTRAMUSCULAR; INTRAVENOUS at 20:32

## 2019-01-01 RX ADMIN — SENNOSIDES AND DOCUSATE SODIUM 2 TABLET: 8.6; 5 TABLET ORAL at 08:43

## 2019-01-01 RX ADMIN — BISACODYL 10 MG: 10 SUPPOSITORY RECTAL at 11:00

## 2019-01-01 RX ADMIN — SENNOSIDES AND DOCUSATE SODIUM 2 TABLET: 8.6; 5 TABLET ORAL at 20:16

## 2019-01-01 RX ADMIN — MORPHINE SULFATE 4 MG: 4 INJECTION INTRAVENOUS at 20:50

## 2019-01-01 RX ADMIN — SODIUM CHLORIDE 50 ML/HR: 9 INJECTION, SOLUTION INTRAVENOUS at 14:18

## 2019-01-01 RX ADMIN — PANTOPRAZOLE SODIUM 40 MG: 40 TABLET, DELAYED RELEASE ORAL at 06:09

## 2019-01-01 RX ADMIN — SODIUM CHLORIDE 50 ML/HR: 9 INJECTION, SOLUTION INTRAVENOUS at 06:22

## 2019-01-01 RX ADMIN — LORAZEPAM 0.5 MG: 2 INJECTION INTRAMUSCULAR; INTRAVENOUS at 12:46

## 2019-01-01 RX ADMIN — MORPHINE SULFATE 2 MG: 2 INJECTION, SOLUTION INTRAMUSCULAR; INTRAVENOUS at 17:20

## 2019-01-01 RX ADMIN — MORPHINE SULFATE 2 MG: 2 INJECTION, SOLUTION INTRAMUSCULAR; INTRAVENOUS at 13:43

## 2019-01-01 RX ADMIN — PANTOPRAZOLE SODIUM 40 MG: 40 TABLET, DELAYED RELEASE ORAL at 08:39

## 2019-01-01 RX ADMIN — ATORVASTATIN CALCIUM 80 MG: 80 TABLET, FILM COATED ORAL at 08:51

## 2019-01-01 RX ADMIN — GLYCOPYRROLATE 0.4 MG: 0.2 INJECTION, SOLUTION INTRAMUSCULAR; INTRAVITREAL at 20:55

## 2019-01-01 RX ADMIN — MORPHINE SULFATE 2 MG: 2 INJECTION, SOLUTION INTRAMUSCULAR; INTRAVENOUS at 09:02

## 2019-01-01 RX ADMIN — LORAZEPAM 1 MG: 2 INJECTION INTRAMUSCULAR; INTRAVENOUS at 04:28

## 2019-01-01 RX ADMIN — MORPHINE SULFATE 4 MG: 4 INJECTION INTRAVENOUS at 20:55

## 2019-01-01 RX ADMIN — POLYETHYLENE GLYCOL 3350 17 G: 17 POWDER, FOR SOLUTION ORAL at 17:53

## 2019-01-01 RX ADMIN — MORPHINE SULFATE 2 MG: 2 INJECTION, SOLUTION INTRAMUSCULAR; INTRAVENOUS at 04:43

## 2019-01-01 RX ADMIN — MORPHINE SULFATE 4 MG: 2 INJECTION, SOLUTION INTRAMUSCULAR; INTRAVENOUS at 14:06

## 2019-01-01 RX ADMIN — LORAZEPAM 1 MG: 2 INJECTION INTRAMUSCULAR; INTRAVENOUS at 10:17

## 2019-01-01 RX ADMIN — POTASSIUM CHLORIDE AND SODIUM CHLORIDE 75 ML/HR: 900; 150 INJECTION, SOLUTION INTRAVENOUS at 19:04

## 2019-01-01 RX ADMIN — PANTOPRAZOLE SODIUM 40 MG: 40 TABLET, DELAYED RELEASE ORAL at 06:33

## 2019-01-01 RX ADMIN — ISOSORBIDE DINITRATE 10 MG: 10 TABLET ORAL at 15:47

## 2019-01-01 RX ADMIN — LORAZEPAM 1 MG: 2 INJECTION INTRAMUSCULAR; INTRAVENOUS at 09:01

## 2019-01-01 RX ADMIN — POLYETHYLENE GLYCOL 3350 17 G: 17 POWDER, FOR SOLUTION ORAL at 21:25

## 2019-01-01 RX ADMIN — MORPHINE SULFATE 4 MG: 2 INJECTION, SOLUTION INTRAMUSCULAR; INTRAVENOUS at 00:50

## 2019-01-01 RX ADMIN — MORPHINE SULFATE 2 MG: 2 INJECTION, SOLUTION INTRAMUSCULAR; INTRAVENOUS at 20:32

## 2019-01-01 RX ADMIN — LORAZEPAM 2 MG: 2 INJECTION INTRAMUSCULAR; INTRAVENOUS at 17:13

## 2019-01-01 RX ADMIN — LORAZEPAM 2 MG: 2 INJECTION INTRAMUSCULAR; INTRAVENOUS at 05:58

## 2019-01-01 RX ADMIN — MORPHINE SULFATE 2 MG: 2 INJECTION, SOLUTION INTRAMUSCULAR; INTRAVENOUS at 12:29

## 2019-01-01 RX ADMIN — PANTOPRAZOLE SODIUM 40 MG: 40 TABLET, DELAYED RELEASE ORAL at 07:34

## 2019-01-01 RX ADMIN — BRIMONIDINE TARTRATE 1 DROP: 1 SOLUTION/ DROPS OPHTHALMIC at 20:38

## 2019-01-01 RX ADMIN — LORAZEPAM 1 MG: 2 INJECTION INTRAMUSCULAR; INTRAVENOUS at 09:15

## 2019-01-01 RX ADMIN — SODIUM CHLORIDE 1000 ML: 9 INJECTION, SOLUTION INTRAVENOUS at 10:06

## 2019-01-01 RX ADMIN — ACETAMINOPHEN 650 MG: 325 TABLET, FILM COATED ORAL at 02:29

## 2019-01-01 RX ADMIN — MORPHINE SULFATE 2 MG: 2 INJECTION, SOLUTION INTRAMUSCULAR; INTRAVENOUS at 20:34

## 2019-01-01 RX ADMIN — LORAZEPAM 1 MG: 2 INJECTION INTRAMUSCULAR; INTRAVENOUS at 04:43

## 2019-01-01 RX ADMIN — SENNOSIDES AND DOCUSATE SODIUM 2 TABLET: 8.6; 5 TABLET ORAL at 20:06

## 2019-01-01 RX ADMIN — LORAZEPAM 1 MG: 2 INJECTION INTRAMUSCULAR; INTRAVENOUS at 03:41

## 2019-01-01 RX ADMIN — MORPHINE SULFATE 4 MG: 4 INJECTION INTRAVENOUS at 17:24

## 2019-01-01 RX ADMIN — HYDROMORPHONE HYDROCHLORIDE 1 MG: 1 INJECTION, SOLUTION INTRAMUSCULAR; INTRAVENOUS; SUBCUTANEOUS at 19:29

## 2019-01-01 RX ADMIN — POLYETHYLENE GLYCOL 3350 17 G: 17 POWDER, FOR SOLUTION ORAL at 20:26

## 2019-01-01 RX ADMIN — SODIUM CHLORIDE, PRESERVATIVE FREE 10 ML: 5 INJECTION INTRAVENOUS at 01:32

## 2019-01-01 RX ADMIN — GLYCOPYRROLATE 0.4 MG: 0.2 INJECTION, SOLUTION INTRAMUSCULAR; INTRAVITREAL at 10:17

## 2019-01-01 RX ADMIN — LORAZEPAM 2 MG: 2 INJECTION INTRAMUSCULAR; INTRAVENOUS at 10:57

## 2019-01-01 RX ADMIN — GLYCOPYRROLATE 0.4 MG: 0.2 INJECTION, SOLUTION INTRAMUSCULAR; INTRAVITREAL at 17:15

## 2019-01-01 RX ADMIN — GLYCOPYRROLATE 0.4 MG: 0.2 INJECTION, SOLUTION INTRAMUSCULAR; INTRAVITREAL at 20:50

## 2019-01-01 RX ADMIN — MORPHINE SULFATE 2 MG: 2 INJECTION, SOLUTION INTRAMUSCULAR; INTRAVENOUS at 23:23

## 2019-01-01 RX ADMIN — ATORVASTATIN CALCIUM 80 MG: 80 TABLET, FILM COATED ORAL at 09:35

## 2019-01-01 RX ADMIN — POLYETHYLENE GLYCOL 3350 17 G: 17 POWDER, FOR SOLUTION ORAL at 20:06

## 2019-01-01 RX ADMIN — MORPHINE SULFATE 4 MG: 4 INJECTION INTRAVENOUS at 08:08

## 2019-01-01 RX ADMIN — CEFTRIAXONE SODIUM 2 G: 2 INJECTION, SOLUTION INTRAVENOUS at 15:08

## 2019-01-01 RX ADMIN — LORAZEPAM 1 MG: 2 INJECTION INTRAMUSCULAR; INTRAVENOUS at 20:34

## 2019-01-01 RX ADMIN — GLYCOPYRROLATE 0.4 MG: 0.2 INJECTION, SOLUTION INTRAMUSCULAR; INTRAVITREAL at 09:03

## 2019-01-01 RX ADMIN — ACETAMINOPHEN 650 MG: 325 TABLET, FILM COATED ORAL at 00:26

## 2019-01-01 RX ADMIN — LORAZEPAM 1 MG: 2 INJECTION INTRAMUSCULAR; INTRAVENOUS at 00:40

## 2019-01-01 RX ADMIN — HYDROMORPHONE HYDROCHLORIDE 1 MG: 1 INJECTION, SOLUTION INTRAMUSCULAR; INTRAVENOUS; SUBCUTANEOUS at 00:29

## 2019-01-01 RX ADMIN — MORPHINE SULFATE 4 MG: 4 INJECTION INTRAVENOUS at 05:08

## 2019-01-01 RX ADMIN — HYDROMORPHONE HYDROCHLORIDE 1 MG: 1 INJECTION, SOLUTION INTRAMUSCULAR; INTRAVENOUS; SUBCUTANEOUS at 10:57

## 2019-01-01 RX ADMIN — GLYCOPYRROLATE 0.4 MG: 0.2 INJECTION, SOLUTION INTRAMUSCULAR; INTRAVITREAL at 08:08

## 2019-01-01 RX ADMIN — MORPHINE SULFATE 4 MG: 4 INJECTION INTRAVENOUS at 05:24

## 2019-01-01 RX ADMIN — LORAZEPAM 1 MG: 2 INJECTION INTRAMUSCULAR; INTRAVENOUS at 10:50

## 2019-01-01 RX ADMIN — SODIUM CHLORIDE, PRESERVATIVE FREE 10 ML: 5 INJECTION INTRAVENOUS at 09:40

## 2019-01-01 RX ADMIN — LORAZEPAM 1 MG: 2 INJECTION INTRAMUSCULAR; INTRAVENOUS at 01:04

## 2019-01-01 RX ADMIN — LORAZEPAM 2 MG: 2 INJECTION INTRAMUSCULAR; INTRAVENOUS at 14:06

## 2019-01-01 RX ADMIN — LORAZEPAM 2 MG: 2 INJECTION INTRAMUSCULAR; INTRAVENOUS at 04:45

## 2019-01-01 RX ADMIN — MORPHINE SULFATE 4 MG: 2 INJECTION, SOLUTION INTRAMUSCULAR; INTRAVENOUS at 09:02

## 2019-01-01 RX ADMIN — BRIMONIDINE TARTRATE 1 DROP: 1 SOLUTION/ DROPS OPHTHALMIC at 08:06

## 2019-01-01 RX ADMIN — GLYCOPYRROLATE 0.4 MG: 0.2 INJECTION, SOLUTION INTRAMUSCULAR; INTRAVITREAL at 08:34

## 2019-01-01 RX ADMIN — CEFTRIAXONE SODIUM 1 G: 1 INJECTION, SOLUTION INTRAVENOUS at 10:35

## 2019-01-01 RX ADMIN — SODIUM CHLORIDE, PRESERVATIVE FREE 10 ML: 5 INJECTION INTRAVENOUS at 20:10

## 2019-01-01 RX ADMIN — HYDROMORPHONE HYDROCHLORIDE 1 MG: 1 INJECTION, SOLUTION INTRAMUSCULAR; INTRAVENOUS; SUBCUTANEOUS at 17:19

## 2019-01-01 RX ADMIN — POLYETHYLENE GLYCOL 3350 17 G: 17 POWDER, FOR SOLUTION ORAL at 08:43

## 2019-01-01 RX ADMIN — BRIMONIDINE TARTRATE 1 DROP: 1 SOLUTION/ DROPS OPHTHALMIC at 15:06

## 2019-01-01 RX ADMIN — HYDROMORPHONE HYDROCHLORIDE 1 MG: 1 INJECTION, SOLUTION INTRAMUSCULAR; INTRAVENOUS; SUBCUTANEOUS at 05:00

## 2019-01-01 RX ADMIN — SODIUM CHLORIDE, PRESERVATIVE FREE 10 ML: 5 INJECTION INTRAVENOUS at 08:36

## 2019-01-01 RX ADMIN — GLYCOPYRROLATE 0.2 MG: 0.2 INJECTION, SOLUTION INTRAMUSCULAR; INTRAVITREAL at 09:02

## 2019-01-01 RX ADMIN — ACETAMINOPHEN 650 MG: 325 TABLET, FILM COATED ORAL at 09:39

## 2019-01-01 RX ADMIN — SODIUM CHLORIDE, PRESERVATIVE FREE 10 ML: 5 INJECTION INTRAVENOUS at 17:54

## 2019-01-01 RX ADMIN — HYDROMORPHONE HYDROCHLORIDE 1 MG: 1 INJECTION, SOLUTION INTRAMUSCULAR; INTRAVENOUS; SUBCUTANEOUS at 14:33

## 2019-01-01 RX ADMIN — FUROSEMIDE 20 MG: 20 INJECTION, SOLUTION INTRAMUSCULAR; INTRAVENOUS at 15:03

## 2019-01-01 RX ADMIN — LORAZEPAM 0.5 MG: 2 INJECTION INTRAMUSCULAR; INTRAVENOUS at 17:19

## 2019-01-01 RX ADMIN — LORAZEPAM 1 MG: 2 INJECTION INTRAMUSCULAR; INTRAVENOUS at 00:37

## 2019-01-01 RX ADMIN — BISACODYL 10 MG: 10 SUPPOSITORY RECTAL at 09:52

## 2019-01-01 RX ADMIN — BISACODYL 10 MG: 10 SUPPOSITORY RECTAL at 18:06

## 2019-01-01 RX ADMIN — ACETAMINOPHEN 650 MG: 325 TABLET, FILM COATED ORAL at 03:15

## 2019-01-01 RX ADMIN — LORAZEPAM 1 MG: 2 INJECTION INTRAMUSCULAR; INTRAVENOUS at 17:18

## 2019-01-01 RX ADMIN — POLYETHYLENE GLYCOL 3350 17 G: 17 POWDER, FOR SOLUTION ORAL at 09:40

## 2019-01-01 RX ADMIN — MORPHINE SULFATE 2 MG: 2 INJECTION, SOLUTION INTRAMUSCULAR; INTRAVENOUS at 18:59

## 2019-01-01 RX ADMIN — LISINOPRIL: 10 TABLET ORAL at 08:51

## 2019-01-01 RX ADMIN — GLYCOPYRROLATE 0.4 MG: 0.2 INJECTION, SOLUTION INTRAMUSCULAR; INTRAVITREAL at 22:37

## 2019-01-01 RX ADMIN — SENNOSIDES AND DOCUSATE SODIUM 2 TABLET: 8.6; 5 TABLET ORAL at 08:35

## 2019-01-01 RX ADMIN — ACETAMINOPHEN 650 MG: 325 TABLET, FILM COATED ORAL at 23:51

## 2019-01-01 RX ADMIN — MORPHINE SULFATE 4 MG: 4 INJECTION INTRAVENOUS at 00:40

## 2019-01-01 RX ADMIN — POLYETHYLENE GLYCOL 3350 17 G: 17 POWDER, FOR SOLUTION ORAL at 08:56

## 2019-01-01 RX ADMIN — MORPHINE SULFATE 2 MG: 2 INJECTION, SOLUTION INTRAMUSCULAR; INTRAVENOUS at 01:04

## 2019-01-01 RX ADMIN — LORAZEPAM 1 MG: 2 INJECTION INTRAMUSCULAR; INTRAVENOUS at 20:50

## 2019-01-01 RX ADMIN — SODIUM CHLORIDE, PRESERVATIVE FREE 10 ML: 5 INJECTION INTRAVENOUS at 20:16

## 2019-01-01 RX ADMIN — PANTOPRAZOLE SODIUM 40 MG: 40 TABLET, DELAYED RELEASE ORAL at 06:19

## 2019-01-01 RX ADMIN — SENNOSIDES AND DOCUSATE SODIUM 2 TABLET: 8.6; 5 TABLET ORAL at 21:25

## 2019-01-01 RX ADMIN — MORPHINE SULFATE 2 MG: 2 INJECTION, SOLUTION INTRAMUSCULAR; INTRAVENOUS at 17:15

## 2019-01-01 RX ADMIN — ACETAMINOPHEN 650 MG: 325 TABLET, FILM COATED ORAL at 06:19

## 2019-01-01 RX ADMIN — SENNOSIDES AND DOCUSATE SODIUM 2 TABLET: 8.6; 5 TABLET ORAL at 20:23

## 2019-01-01 RX ADMIN — MORPHINE SULFATE 4 MG: 4 INJECTION INTRAVENOUS at 04:28

## 2019-01-01 RX ADMIN — LORAZEPAM 1 MG: 2 INJECTION INTRAMUSCULAR; INTRAVENOUS at 08:08

## 2019-01-01 RX ADMIN — SODIUM CHLORIDE 125 ML/HR: 9 INJECTION, SOLUTION INTRAVENOUS at 17:53

## 2019-01-01 RX ADMIN — SENNOSIDES AND DOCUSATE SODIUM 2 TABLET: 8.6; 5 TABLET ORAL at 21:10

## 2019-01-01 RX ADMIN — GLYCOPYRROLATE 0.4 MG: 0.2 INJECTION, SOLUTION INTRAMUSCULAR; INTRAVITREAL at 00:56

## 2019-01-01 RX ADMIN — MORPHINE SULFATE 4 MG: 4 INJECTION INTRAVENOUS at 12:48

## 2019-01-01 RX ADMIN — GLYCOPYRROLATE 0.4 MG: 0.2 INJECTION, SOLUTION INTRAMUSCULAR; INTRAVITREAL at 14:33

## 2019-01-01 RX ADMIN — MORPHINE SULFATE 4 MG: 4 INJECTION INTRAVENOUS at 08:34

## 2019-01-01 RX ADMIN — ASPIRIN 81 MG: 81 TABLET, COATED ORAL at 09:40

## 2019-01-01 RX ADMIN — SODIUM CHLORIDE, PRESERVATIVE FREE 10 ML: 5 INJECTION INTRAVENOUS at 21:10

## 2019-01-01 RX ADMIN — LORAZEPAM 1 MG: 2 INJECTION INTRAMUSCULAR; INTRAVENOUS at 00:50

## 2019-01-01 RX ADMIN — MORPHINE SULFATE 4 MG: 2 INJECTION, SOLUTION INTRAMUSCULAR; INTRAVENOUS at 04:44

## 2019-01-01 RX ADMIN — LORAZEPAM 2 MG: 2 INJECTION INTRAMUSCULAR; INTRAVENOUS at 09:03

## 2019-01-01 RX ADMIN — LORAZEPAM 1 MG: 2 INJECTION INTRAMUSCULAR; INTRAVENOUS at 21:42

## 2019-01-01 RX ADMIN — HYDROMORPHONE HYDROCHLORIDE 1 MG: 1 INJECTION, SOLUTION INTRAMUSCULAR; INTRAVENOUS; SUBCUTANEOUS at 03:41

## 2019-01-01 RX ADMIN — MORPHINE SULFATE 2 MG: 2 INJECTION, SOLUTION INTRAMUSCULAR; INTRAVENOUS at 17:18

## 2019-01-01 RX ADMIN — HYDROMORPHONE HYDROCHLORIDE 1 MG: 1 INJECTION, SOLUTION INTRAMUSCULAR; INTRAVENOUS; SUBCUTANEOUS at 00:38

## 2019-01-01 RX ADMIN — PANTOPRAZOLE SODIUM 40 MG: 40 TABLET, DELAYED RELEASE ORAL at 18:06

## 2019-01-01 RX ADMIN — MORPHINE SULFATE 4 MG: 4 INJECTION INTRAVENOUS at 10:17

## 2019-01-01 RX ADMIN — ACETAMINOPHEN 650 MG: 325 TABLET, FILM COATED ORAL at 10:15

## 2019-01-01 RX ADMIN — SODIUM CHLORIDE, PRESERVATIVE FREE 10 ML: 5 INJECTION INTRAVENOUS at 09:51

## 2019-01-01 RX ADMIN — HYDROMORPHONE HYDROCHLORIDE 1 MG: 1 INJECTION, SOLUTION INTRAMUSCULAR; INTRAVENOUS; SUBCUTANEOUS at 21:42

## 2019-01-01 RX ADMIN — BRIMONIDINE TARTRATE 1 DROP: 1 SOLUTION/ DROPS OPHTHALMIC at 21:00

## 2019-01-01 RX ADMIN — PANTOPRAZOLE SODIUM 40 MG: 40 TABLET, DELAYED RELEASE ORAL at 17:12

## 2019-01-01 RX ADMIN — MORPHINE SULFATE 4 MG: 4 INJECTION INTRAVENOUS at 16:45

## 2019-01-01 RX ADMIN — POLYETHYLENE GLYCOL 3350 17 G: 17 POWDER, FOR SOLUTION ORAL at 10:59

## 2019-01-01 RX ADMIN — LORAZEPAM 1 MG: 2 INJECTION INTRAMUSCULAR; INTRAVENOUS at 16:45

## 2019-01-01 RX ADMIN — LORAZEPAM 1 MG: 2 INJECTION INTRAMUSCULAR; INTRAVENOUS at 14:33

## 2019-01-01 RX ADMIN — BRIMONIDINE TARTRATE 1 DROP: 1 SOLUTION/ DROPS OPHTHALMIC at 16:22

## 2019-01-01 RX ADMIN — PANTOPRAZOLE SODIUM 40 MG: 40 TABLET, DELAYED RELEASE ORAL at 06:32

## 2019-01-01 RX ADMIN — LORAZEPAM 1 MG: 2 INJECTION INTRAMUSCULAR; INTRAVENOUS at 17:37

## 2019-01-01 RX ADMIN — HYDROMORPHONE HYDROCHLORIDE 1 MG: 1 INJECTION, SOLUTION INTRAMUSCULAR; INTRAVENOUS; SUBCUTANEOUS at 17:13

## 2019-01-01 RX ADMIN — GLYCOPYRROLATE 0.4 MG: 0.2 INJECTION, SOLUTION INTRAMUSCULAR; INTRAVITREAL at 00:50

## 2019-01-01 RX ADMIN — ASPIRIN 324 MG: 81 TABLET, CHEWABLE ORAL at 18:57

## 2019-01-01 RX ADMIN — GLYCOPYRROLATE 0.2 MG: 0.2 INJECTION, SOLUTION INTRAMUSCULAR; INTRAVITREAL at 06:31

## 2019-01-01 RX ADMIN — GLYCOPYRROLATE 0.4 MG: 0.2 INJECTION, SOLUTION INTRAMUSCULAR; INTRAVITREAL at 17:24

## 2019-01-01 RX ADMIN — ASPIRIN 81 MG: 81 TABLET, COATED ORAL at 08:35

## 2019-01-01 RX ADMIN — MORPHINE SULFATE 2 MG: 2 INJECTION, SOLUTION INTRAMUSCULAR; INTRAVENOUS at 17:38

## 2019-01-01 RX ADMIN — POTASSIUM CHLORIDE AND SODIUM CHLORIDE 75 ML/HR: 900; 150 INJECTION, SOLUTION INTRAVENOUS at 22:45

## 2019-01-01 RX ADMIN — PANTOPRAZOLE SODIUM 40 MG: 40 TABLET, DELAYED RELEASE ORAL at 17:38

## 2019-01-01 RX ADMIN — MORPHINE SULFATE 2 MG: 2 INJECTION, SOLUTION INTRAMUSCULAR; INTRAVENOUS at 09:15

## 2019-01-01 RX ADMIN — PANTOPRAZOLE SODIUM 40 MG: 40 TABLET, DELAYED RELEASE ORAL at 17:53

## 2019-01-01 RX ADMIN — POLYETHYLENE GLYCOL 3350 17 G: 17 POWDER, FOR SOLUTION ORAL at 09:51

## 2019-01-01 RX ADMIN — MORPHINE SULFATE 4 MG: 4 INJECTION INTRAVENOUS at 12:46

## 2019-01-01 RX ADMIN — GLYCOPYRROLATE 0.4 MG: 0.2 INJECTION, SOLUTION INTRAMUSCULAR; INTRAVITREAL at 08:28

## 2019-01-01 RX ADMIN — GLYCOPYRROLATE 0.4 MG: 0.2 INJECTION, SOLUTION INTRAMUSCULAR; INTRAVITREAL at 12:46

## 2019-01-01 RX ADMIN — LORAZEPAM 1 MG: 2 INJECTION INTRAMUSCULAR; INTRAVENOUS at 04:55

## 2019-01-01 RX ADMIN — MORPHINE SULFATE 4 MG: 4 INJECTION INTRAVENOUS at 00:56

## 2019-01-01 RX ADMIN — LORAZEPAM 1 MG: 2 INJECTION INTRAMUSCULAR; INTRAVENOUS at 00:22

## 2019-01-01 RX ADMIN — MORPHINE SULFATE 4 MG: 4 INJECTION INTRAVENOUS at 00:21

## 2019-01-01 RX ADMIN — GLYCOPYRROLATE 0.4 MG: 0.2 INJECTION, SOLUTION INTRAMUSCULAR; INTRAVITREAL at 14:06

## 2019-01-01 RX ADMIN — POLYETHYLENE GLYCOL 3350 17 G: 17 POWDER, FOR SOLUTION ORAL at 20:23

## 2019-01-01 RX ADMIN — ASPIRIN 81 MG: 81 TABLET, COATED ORAL at 09:14

## 2019-01-01 RX ADMIN — ACETAMINOPHEN 650 MG: 325 TABLET, FILM COATED ORAL at 20:38

## 2019-01-01 RX ADMIN — SODIUM CHLORIDE 125 ML/HR: 4.5 INJECTION, SOLUTION INTRAVENOUS at 16:29

## 2019-01-01 RX ADMIN — SODIUM CHLORIDE, PRESERVATIVE FREE 10 ML: 5 INJECTION INTRAVENOUS at 01:31

## 2019-01-01 RX ADMIN — ACETAMINOPHEN 650 MG: 325 TABLET, FILM COATED ORAL at 20:06

## 2019-01-01 RX ADMIN — BISACODYL 10 MG: 10 SUPPOSITORY RECTAL at 08:35

## 2019-01-01 RX ADMIN — MORPHINE SULFATE 2 MG: 2 INJECTION, SOLUTION INTRAMUSCULAR; INTRAVENOUS at 08:11

## 2019-01-01 RX ADMIN — SODIUM CHLORIDE, PRESERVATIVE FREE 3 ML: 5 INJECTION INTRAVENOUS at 09:36

## 2019-01-01 RX ADMIN — SODIUM CHLORIDE, PRESERVATIVE FREE 10 ML: 5 INJECTION INTRAVENOUS at 21:25

## 2019-01-01 RX ADMIN — MORPHINE SULFATE 4 MG: 4 INJECTION INTRAVENOUS at 17:15

## 2019-01-01 RX ADMIN — SENNOSIDES AND DOCUSATE SODIUM 2 TABLET: 8.6; 5 TABLET ORAL at 20:26

## 2019-01-01 RX ADMIN — MORPHINE SULFATE 2 MG: 2 INJECTION, SOLUTION INTRAMUSCULAR; INTRAVENOUS at 01:19

## 2019-01-01 RX ADMIN — SODIUM CHLORIDE, PRESERVATIVE FREE 3 ML: 5 INJECTION INTRAVENOUS at 22:45

## 2019-01-01 RX ADMIN — BISACODYL 10 MG: 10 SUPPOSITORY RECTAL at 19:46

## 2019-01-01 RX ADMIN — ACETAMINOPHEN 650 MG: 325 TABLET, FILM COATED ORAL at 08:43

## 2019-01-01 RX ADMIN — MORPHINE SULFATE 2 MG: 2 INJECTION, SOLUTION INTRAMUSCULAR; INTRAVENOUS at 04:55

## 2019-01-01 RX ADMIN — PANTOPRAZOLE SODIUM 40 MG: 40 TABLET, DELAYED RELEASE ORAL at 08:48

## 2019-01-01 RX ADMIN — BISACODYL 10 MG: 10 SUPPOSITORY RECTAL at 08:43

## 2019-01-01 RX ADMIN — LORAZEPAM 1 MG: 2 INJECTION INTRAMUSCULAR; INTRAVENOUS at 19:30

## 2019-01-01 RX ADMIN — SODIUM CHLORIDE, PRESERVATIVE FREE 3 ML: 5 INJECTION INTRAVENOUS at 08:51

## 2019-01-01 RX ADMIN — HYDROMORPHONE HYDROCHLORIDE 1 MG: 1 INJECTION, SOLUTION INTRAMUSCULAR; INTRAVENOUS; SUBCUTANEOUS at 12:44

## 2019-01-01 RX ADMIN — MORPHINE SULFATE 4 MG: 4 INJECTION INTRAVENOUS at 08:28

## 2019-01-01 RX ADMIN — POLYETHYLENE GLYCOL 3350 17 G: 17 POWDER, FOR SOLUTION ORAL at 08:35

## 2019-01-01 RX ADMIN — GLYCOPYRROLATE 0.2 MG: 0.2 INJECTION, SOLUTION INTRAMUSCULAR; INTRAVITREAL at 05:08

## 2019-01-01 RX ADMIN — SODIUM CHLORIDE 75 ML/HR: 9 INJECTION, SOLUTION INTRAVENOUS at 02:29

## 2019-01-01 RX ADMIN — ASPIRIN 325 MG: 325 TABLET ORAL at 08:51

## 2019-01-01 RX ADMIN — GLYCOPYRROLATE 0.4 MG: 0.2 INJECTION, SOLUTION INTRAMUSCULAR; INTRAVITREAL at 12:43

## 2019-01-01 RX ADMIN — SODIUM CHLORIDE, PRESERVATIVE FREE 3 ML: 5 INJECTION INTRAVENOUS at 20:07

## 2019-01-01 RX ADMIN — GLYCOPYRROLATE 0.4 MG: 0.2 INJECTION, SOLUTION INTRAMUSCULAR; INTRAVITREAL at 00:40

## 2019-01-01 RX ADMIN — LORAZEPAM 2 MG: 2 INJECTION INTRAMUSCULAR; INTRAVENOUS at 00:29

## 2019-01-01 RX ADMIN — LORAZEPAM 0.5 MG: 2 INJECTION INTRAMUSCULAR; INTRAVENOUS at 08:11

## 2019-01-01 RX ADMIN — SODIUM CHLORIDE, PRESERVATIVE FREE 10 ML: 5 INJECTION INTRAVENOUS at 20:27

## 2019-01-01 RX ADMIN — LORAZEPAM 1 MG: 2 INJECTION INTRAMUSCULAR; INTRAVENOUS at 17:20

## 2019-01-01 RX ADMIN — LORAZEPAM 2 MG: 2 INJECTION INTRAMUSCULAR; INTRAVENOUS at 20:31

## 2019-01-01 RX ADMIN — SENNOSIDES AND DOCUSATE SODIUM 2 TABLET: 8.6; 5 TABLET ORAL at 09:51

## 2019-01-01 RX ADMIN — ASPIRIN 81 MG: 81 TABLET, COATED ORAL at 10:59

## 2019-01-01 RX ADMIN — MORPHINE SULFATE 2 MG: 2 INJECTION, SOLUTION INTRAMUSCULAR; INTRAVENOUS at 00:22

## 2019-01-01 RX ADMIN — HYDROMORPHONE HYDROCHLORIDE 1 MG: 1 INJECTION, SOLUTION INTRAMUSCULAR; INTRAVENOUS; SUBCUTANEOUS at 20:31

## 2019-01-01 RX ADMIN — LORAZEPAM 1 MG: 2 INJECTION INTRAMUSCULAR; INTRAVENOUS at 17:19

## 2019-01-01 RX ADMIN — LORAZEPAM 2 MG: 2 INJECTION INTRAMUSCULAR; INTRAVENOUS at 17:24

## 2019-01-01 RX ADMIN — POLYETHYLENE GLYCOL 3350 17 G: 17 POWDER, FOR SOLUTION ORAL at 20:17

## 2019-01-01 RX ADMIN — GLYCOPYRROLATE 0.4 MG: 0.2 INJECTION, SOLUTION INTRAMUSCULAR; INTRAVITREAL at 17:19

## 2019-01-01 RX ADMIN — SENNOSIDES AND DOCUSATE SODIUM 2 TABLET: 8.6; 5 TABLET ORAL at 08:56

## 2019-01-01 RX ADMIN — IOPAMIDOL 95 ML: 755 INJECTION, SOLUTION INTRAVENOUS at 08:30

## 2019-01-01 RX ADMIN — GADOBENATE DIMEGLUMINE 11 ML: 529 INJECTION, SOLUTION INTRAVENOUS at 08:31

## 2019-01-01 RX ADMIN — ASPIRIN 325 MG: 325 TABLET ORAL at 09:35

## 2019-01-01 RX ADMIN — GLYCOPYRROLATE 0.4 MG: 0.2 INJECTION, SOLUTION INTRAMUSCULAR; INTRAVITREAL at 04:44

## 2019-01-01 RX ADMIN — GLYCOPYRROLATE 0.4 MG: 0.2 INJECTION, SOLUTION INTRAMUSCULAR; INTRAVITREAL at 17:13

## 2019-08-07 PROBLEM — R63.4 WEIGHT LOSS, ABNORMAL: Status: ACTIVE | Noted: 2019-01-01

## 2019-08-07 PROBLEM — E78.5 HYPERLIPIDEMIA: Chronic | Status: ACTIVE | Noted: 2019-01-01

## 2019-08-07 PROBLEM — R55 SYNCOPE: Status: ACTIVE | Noted: 2019-01-01

## 2019-08-07 PROBLEM — I10 HYPERTENSION: Chronic | Status: ACTIVE | Noted: 2019-01-01

## 2019-08-07 PROBLEM — Z87.11 HISTORY OF PEPTIC ULCER: Chronic | Status: ACTIVE | Noted: 2019-01-01

## 2019-08-07 NOTE — ED NOTES
Pt found sitting in chair with left tilt, L sided facial droop, completely unresponsive, even to sternal rub. Lasted for approx 60-90 seconds, aroused in ambulance     David Savage RN  08/07/19 3418

## 2019-08-07 NOTE — PROGRESS NOTES
Clinical Pharmacy Services: Medication History    Maru Richey is a 89 y.o. female presenting to [facility] for   Chief Complaint   Patient presents with   • Syncope       She  has a past medical history of Hyperlipidemia, Hypertension, and Iron deficiency.    Allergies as of 08/07/2019   • (No Known Allergies)       Medication information was obtained from: Patient, medication bottles  Pharmacy and Phone Number: [facility] [phone]    Prior to Admission Medications     Prescriptions Last Dose Informant Patient Reported? Taking?    amLODIPine-benazepril (LOTREL 2.5-10) 2.5-10 MG per capsule 8/7/2019 Medication Bottle Yes Yes    Take 1 capsule by mouth Daily.    atorvastatin (LIPITOR) 40 MG tablet 8/6/2019 Medication Bottle Yes Yes    Take 40 mg by mouth Every Night.    ferrous gluconate (FERGON) 240 (27 FE) MG tablet 8/6/2019 Medication Bottle Yes Yes    Take 240 mg by mouth 2 (Two) Times a Day.            Medication notes: None    This medication list is complete to the best of my knowledge as of 8/7/2019    Please call if questions.    Asya Govea, Medication History Technician  8/7/2019 6:55 PM

## 2019-08-08 NOTE — PROGRESS NOTES
[facility]               ASSOCIATES     LOS: 0 days     Name: Maru Richey  Age: 89 y.o.  Sex: female  :  1925  MRN: [id number]         Primary Care Physician: Mirna Richmond MD    Diet Regular; Cardiac    Subjective   No complaints.  She feels back to normal.  Has been off the floor testing most days.  Did not get a chance to work with physical therapy yet.    Review of Systems   Respiratory: Negative for shortness of breath.    Cardiovascular: Negative for chest pain.     Objective   Temp:  [97.8 °F (36.6 °C)-98 °F (36.7 °C)] 97.9 °F (36.6 °C)  Heart Rate:  [63-92] 92  Resp:  [16-18] 16  BP: ()/(67-99) 175/90  SpO2:  [91 %-100 %] 100 %  on   ;   Device (Oxygen Therapy): room air  Body mass index is 21.97 kg/m².    Physical Exam   Constitutional: She is oriented to person, place, and time. No distress.   Cardiovascular: Normal rate and regular rhythm.   Pulmonary/Chest: Effort normal and breath sounds normal. No respiratory distress.   Abdominal: Soft. Bowel sounds are normal. There is no tenderness. There is no rebound and no guarding.   Musculoskeletal: She exhibits no edema.   Neurological: She is alert and oriented to person, place, and time.   Skin: Skin is warm and dry.   Psychiatric: She has a normal mood and affect. Her behavior is normal.     Reviewed medications and new clinical results    Scheduled Meds  aspirin 325 mg Oral Daily   Or      aspirin 300 mg Rectal Daily   atorvastatin 80 mg Oral Daily   sodium chloride 3 mL Intravenous Q12H     Continuous Infusions  sodium chloride 0.9 % with KCl 20 mEq 75 mL/hr Last Rate: 75 mL/hr (19 5922)     PRN Meds  •  acetaminophen  •  docusate sodium  •  docusate sodium  •  magnesium hydroxide  •  nitroglycerin  •  ondansetron  •  sodium chloride    Results from last 7 days   Lab Units 19  0518 19  1536   WBC 10*3/mm3 4.88 5.52   HEMOGLOBIN g/dL 8.9* 10.1*   PLATELETS 10*3/mm3 111* 114*      Results from last 7 days   Lab Units 08/08/19  0518 08/07/19  1536   SODIUM mmol/L 143 141   POTASSIUM mmol/L 4.5 4.2   CHLORIDE mmol/L 108* 105   CO2 mmol/L 25.8 26.8   BUN mg/dL 26* 32*   CREATININE mg/dL 0.90 1.22*   CALCIUM mg/dL 10.3* 10.7*   GLUCOSE mg/dL 93 147*     Lab Results   Component Value Date    ANIONGAP 9.2 08/08/2019     Glucose   Date/Time Value Ref Range Status   08/08/2019 0538 93 70 - 130 mg/dL Final   08/07/2019 2123 142 (H) 70 - 130 mg/dL Final     Hemoglobin A1C   Date Value Ref Range Status   08/08/2019 5.50 4.80 - 5.60 % Final     Estimated Creatinine Clearance: 33.9 mL/min (by C-G formula based on SCr of 0.9 mg/dL).    I personally reviewed MRI    Results for orders placed during the hospital encounter of 08/07/19   Adult Transthoracic Echo Complete W/ Cont if Necessary Per Protocol (With Agitated Saline)    Narrative · Left ventricular systolic function is normal. Calculated EF = 61.0%.   Estimated EF was in agreement with the calculated EF. Normal left   ventricular cavity size noted. All left ventricular wall segments contract   normally.  · There is mild concentric LVH, with moderate asymmetric septal   hypertrophy; there is no evidence of obstruction. Left ventricular   diastolic dysfunction is noted (grade I a w/high LAP) consistent with   impaired relaxation.  · Left atrial cavity size is mildly dilated. Saline test results are   negative.  · There is moderate calcification of the aortic valve.No significant   aortic valve regurgitation is present. No hemodynamically significant   aortic valve stenosis is present.        Assessment/Plan   Active Hospital Problems    Diagnosis  POA   • Syncope [R55]  Yes   • Hypertension [I10]  Yes   • Hyperlipidemia [E78.5]  Yes   • History of peptic ulcer [Z87.11]  Not Applicable   • Weight loss, abnormal [R63.4]  Yes      Resolved Hospital Problems   No resolved problems to display.     89 y.o. female     · Syncope: Echocardiogram noted.  MRI  no infarct. Possible aneurysms on MRA. consult neurology  · Hypertension: Resume BP meds  · CKD 3  · Hypercalcemia: Check PTH. she has had some weight loss.  · Anemia some drop dilution  · disposition to be determined.  She just moved up here from [place] to live with daughter.  PT  · discussed with patient, family and nursing staff.    Rohit Lee MD   08/08/19  1:33 PM

## 2019-08-08 NOTE — PLAN OF CARE
Problem: Patient Care Overview  Goal: Plan of Care Review   08/08/19 1156   OTHER   Outcome Summary Discussed with RN. MRI negative and pt passed RN swallow screen. RN and ST in agreement eval not warranted. ST to s/o, please re-consult as warranted.

## 2019-08-08 NOTE — CONSULTS
Neurology Consult Note    Consult Date: 8/8/2019    Referring MD: Brenton Johnston MD    Reason for Consult I have been asked to see the patient in neurological consultation to render advice and opinion regarding syncope versus TIA    Maru Richey is a 89 y.o. female with past medical history of hypertension, hyperlipidemia, otherwise healthy.  She recently moved from [place] to [place] to live with her daughter due to increasing frailty.  Her daughter reports that they went out to get the patient's hair done the day prior to admission.  When they came home the patient was ambulating to come in the house and complained that something felt wrong and that she thought she would fall.  The patient's daughter thought that she looked lightheaded and quickly sat her down and the patient lost consciousness.  She had about a 10-minute loss of consciousness associated with left facial droop.  No unilateral shaking or jerking was seen.  By the time she was laid down on the gurney with EMS she woke up and was more or less immediately back to her baseline.  She did not have any residual symptoms or facial droop.  No prior history of TIA.  No recurrent symptoms since admission.    No family history of aneurysm or subarachnoid hemorrhage    Past Medical/Surgical Hx:  Past Medical History:   Diagnosis Date   • Hyperlipidemia    • Hypertension    • Iron deficiency      Past Surgical History:   Procedure Laterality Date   • HYSTERECTOMY         Medications On Admission  Medications Prior to Admission   Medication Sig Dispense Refill Last Dose   • amLODIPine-benazepril (LOTREL 2.5-10) 2.5-10 MG per capsule Take 1 capsule by mouth Daily.   8/7/2019 at Unknown time   • atorvastatin (LIPITOR) 40 MG tablet Take 40 mg by mouth Every Night.   8/6/2019 at Unknown time   • ferrous gluconate (FERGON) 240 (27 FE) MG tablet Take 240 mg by mouth 2 (Two) Times a Day.   8/6/2019 at Unknown time   • fluticasone (FLONASE) 50 MCG/ACT nasal  "spray 1 spray into the nostril(s) as directed by provider Daily As Needed for Rhinitis or Allergies.   8/7/2019 at Unknown time       Allergies:  No Known Allergies    Social Hx:  Social History     Socioeconomic History   • Marital status: Single     Spouse name: Not on file   • Number of children: Not on file   • Years of education: Not on file   • Highest education level: Not on file   Tobacco Use   • Smoking status: Never Smoker   Substance and Sexual Activity   • Alcohol use: No     Frequency: Never   • Drug use: No       Family Hx:  History reviewed. No pertinent family history.    Review of Systems obtained from patient, daughter  Constitutional: [No fevers, chills]  Eye: [No recent visual problems, eye discharge]  HEENT: [No ear pain, nasal congestion]  Respiratory: [No shortness of breath, cough]  Cardiovascular: [No Chest pain, palpitations]  Gastrointestinal: [No nausea, vomiting]  Genitourinary: [No hematuria, incontinence]  Hema/Lymph: [no nosebleeds, history of anticoagulation]  Endocrine: [Negative for excessive urination, heat or cold intolerance]  Musculoskeletal: [No back pain, neck pain]  Integumentary: [No rash, pruritus]  Neurologic: [+ weakness, no numbness]  Psychiatric: [No anxiety, depression]    Exam    /65 (BP Location: Left arm, Patient Position: Sitting)   Pulse 86   Temp 98.2 °F (36.8 °C) (Oral)   Resp 18   Ht 160 cm (63\")   Wt 56.2 kg (124 lb)   SpO2 100%   BMI 21.97 kg/m²   gen: NAD, vitals reviewed  Eyes: fundus sharp with no papilledema or retinal hemorrhages  CVS: RRR, S1, S2  MS: oriented x3, recent/remote memory intact, normal attention/concentration, mild word finding difficulty, no neglect, normal fund of knowledge  CN: visual acuity grossly normal, visual fields full, PERRL, EOMI, facial sensation equal, no facial droop, hearing symmetric, palate elevates symmetrically, shoulder shrug equal, tongue midline  Motor: 4+ /5 throughout upper and lower extremities, " normal tone  Sensation: intact to vibration and temperature throughout  Reflexes: 2+ throughout upper and lower extremities, downgoing plantars  Coordination: no dysmetria with finger to nose bilaterally  Gait: Unable to get out of bed without assistance due to generalized weakness    DATA:    Lab Results   Component Value Date    GLUCOSE 93 08/08/2019    CALCIUM 10.3 (H) 08/08/2019     08/08/2019    K 4.5 08/08/2019    CO2 25.8 08/08/2019     (H) 08/08/2019    BUN 26 (H) 08/08/2019    CREATININE 0.90 08/08/2019    EGFRIFAFRI 71 08/08/2019    EGFRIFNONA 41 (L) 08/07/2019    BCR 28.9 (H) 08/08/2019    ANIONGAP 9.2 08/08/2019     Lab Results   Component Value Date    WBC 4.88 08/08/2019    HGB 8.9 (L) 08/08/2019    HCT 29.2 (L) 08/08/2019    MCV 93.9 08/08/2019     (L) 08/08/2019     Lab Results   Component Value Date    CHOL 122 08/08/2019     Lab Results   Component Value Date    HDL 49 08/08/2019     Lab Results   Component Value Date    LDL 66 08/08/2019     Lab Results   Component Value Date    TRIG 37 08/08/2019     Lab Results   Component Value Date    HGBA1C 5.50 08/08/2019     No results found for: INR, PROTIME    Lab review: Mild anemia, GFR 71    Imaging review: I personally reviewed her MRI brain and MRA head neck.  MRI brain shows moderate generalized atrophy, mild white matter disease.  MRA of the neck shows 40% right distal common carotid stenosis.  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  Radiology report reviewed    Impression:  1) syncope  2) left facial droop  3) right carotid stenosis    Comment: Based on history and neuroimaging the most likely scenario is that this patient had orthostatic syncope with associated mild right hemispheric ischemia from her right carotid stenosis.  She has also a possible aneurysm and right middle cerebral artery stenosis.  Given that  there are a lot of equivocal findings on her MRA and this patient seems to have had definite facial droop I would like to get a CT angiogram to confirm whether or not she actually has MCA stenosis, to be precise about the degree of stenosis in the right carotid, and to be certain about whether or not she has an aneurysm.  Most likely plan will be low dose ASA and serial carotid dopplers to monitor her right CCA stenosis.    PLAN:  1. CTA H/N  2. ASA, statin  3. Likely outpatient neurosurgery follow up for asymptomatic aneurysm. Getting CTA to confirm.    Will follow

## 2019-08-08 NOTE — SIGNIFICANT NOTE
08/08/19 1605   Rehab Time/Intention   Evaluation Not Performed patient unavailable for evaluation  (off floor)   Rehab Treatment   Discipline occupational therapist

## 2019-08-08 NOTE — H&P
PCP: Provider, No Known    Chief complaint   Chief Complaint   Patient presents with   • Syncope       HPI  Patient is a 89 y.o. female presents with a history of hypertension, hyperlipidemia who presents by EMS due to passing out and left-sided facial droop.  Patient recently was in a nursing home and had lost a lot of weight over the last few months about 10 pounds and her family moved her up from [place] a couple weeks ago.  She was getting out of the car after getting her hair done and walking but moving slowly.  She said her daughter I think am going to fall and her daughter said it is okay I will catch you and then she went limp.  Her eyes stayed open.  It was several minutes.  She may have urinated on herself.  The daughter says that she was slumped to the left side and had a left facial droop.  EMS said blood pressure and blood sugar were okay.  She is not on a baby aspirin.  She took an allergy medicine once over a week ago and had some urinating issues and therefore did not take again.  She has had some baseline tremulousness and an occasional headache.      PAST MEDICAL HISTORY  Past Medical History:   Diagnosis Date   • Hyperlipidemia    • Hypertension    • Iron deficiency        PAST SURGICAL HISTORY  Past Surgical History:   Procedure Laterality Date   • HYSTERECTOMY         FAMILY HISTORY  History reviewed. No pertinent family history.    SOCIAL HISTORY  Social History     Tobacco Use   • Smoking status: Never Smoker   Substance Use Topics   • Alcohol use: No     Frequency: Never   • Drug use: No       MEDICATIONS:  Medications Prior to Admission   Medication Sig Dispense Refill Last Dose   • amLODIPine-benazepril (LOTREL 2.5-10) 2.5-10 MG per capsule Take 1 capsule by mouth Daily.   8/7/2019 at Unknown time   • atorvastatin (LIPITOR) 40 MG tablet Take 40 mg by mouth Every Night.   8/6/2019 at Unknown time   • ferrous gluconate (FERGON) 240 (27 FE) MG tablet Take 240 mg by mouth 2 (Two) Times a Day.  "  8/6/2019 at Unknown time   • fluticasone (FLONASE) 50 MCG/ACT nasal spray 1 spray into the nostril(s) as directed by provider Daily As Needed for Rhinitis or Allergies.   8/7/2019 at Unknown time       Allergies:  Patient has no known allergies.    Review of Systems:  fatigue , walks with a walker, occasional tremulousness, occasional headache,  Negative for following (except as per HPI):  Constitution: chills, fevers,   Eyes: change of vision, loss of vision and discharge  ENT: ear drainage, ear ringing and facial trauma  Respiratory: cough, pleuritic pain, shortness of air  Cardiovascular: chest pressure, pain, lower extremity edema, palpitations  Gastrointestinal: constipation, diarrhea, nausea, vomiting, pain    Integument: rash and wound  Hematologic / Lymphatic: excessive bleeding and easy bruising  Musculoskeletal: joint pain, joint stiffness, joint swelling and muscle pain  Neurological: headaches, numbness, seizures and tremors  Behavioral / Psych: anxiety, depression and hallucinations         Vital Signs  Temp:  [98 °F (36.7 °C)] 98 °F (36.7 °C)  Heart Rate:  [63-80] 80  Resp:  [16-18] 16  BP: (126-149)/(67-84) 134/79  Flowsheet Rows      First Filed Value   Admission Height  162.6 cm (64\") Documented at 08/07/2019 1522   Admission Weight  55.5 kg (122 lb 4.8 oz) Documented at 08/07/2019 1523         Body mass index is 20.99 kg/m².    Physical Exam:  General Appearance:    Alert, cooperative, in no acute distress   Head:    Normocephalic, without obvious abnormality, atraumatic   Eyes:         conjunctivae and sclerae normal, no icterus, PERRLA, arcus senilis   ENT:    Ears grossly intact, oral mucosa moist,   Neck:   No adenopathy, supple, trachea midline,    Back:     Normal to inspection, range of motion normal   Lungs:     Clear to auscultation,respirations regular, even and                   unlabored    Heart:    Regular rhythm and normal rate,  no murmur, normal S1 and S2,   Abdomen:     Normal " bowel sounds, no masses,  soft non-tender, non-distended,    Extremities:   Moves all extremities well, no cyanosis, no edema, ulnar deviation bilaterally,              Pulses:   Pulses palpable and equal bilaterally   Skin:   No bleeding, rash, bruising    Neurologic:      Psych:   Cranial nerves 2 - 12 grossly intact, sensation grossly intact,     Moves all extremities well, equal bilateral strength 3/5 bilaterally    Alert and Oriented x 3 (hosp, aug6/7, 2019), Normal Affect   LABS:  Admission on 08/07/2019   Component Date Value Ref Range Status   • Glucose 08/07/2019 147* 65 - 99 mg/dL Final   • BUN 08/07/2019 32* 8 - 23 mg/dL Final   • Creatinine 08/07/2019 1.22* 0.57 - 1.00 mg/dL Final   • Sodium 08/07/2019 141  136 - 145 mmol/L Final   • Potassium 08/07/2019 4.2  3.5 - 5.2 mmol/L Final   • Chloride 08/07/2019 105  98 - 107 mmol/L Final   • CO2 08/07/2019 26.8  22.0 - 29.0 mmol/L Final   • Calcium 08/07/2019 10.7* 8.2 - 9.6 mg/dL Final   • Total Protein 08/07/2019 7.0  6.0 - 8.5 g/dL Final   • Albumin 08/07/2019 3.90  3.50 - 5.20 g/dL Final   • ALT (SGPT) 08/07/2019 14  1 - 33 U/L Final   • AST (SGOT) 08/07/2019 22  1 - 32 U/L Final   • Alkaline Phosphatase 08/07/2019 63  39 - 117 U/L Final   • Total Bilirubin 08/07/2019 0.3  0.2 - 1.2 mg/dL Final   • eGFR Non African Amer 08/07/2019 41* >60 mL/min/1.73 Final   • eGFR   Amer 08/07/2019 50* >60 mL/min/1.73 Final   • Globulin 08/07/2019 3.1  gm/dL Final   • A/G Ratio 08/07/2019 1.3  g/dL Final   • BUN/Creatinine Ratio 08/07/2019 26.2* 7.0 - 25.0 Final   • Anion Gap 08/07/2019 9.2  5.0 - 15.0 mmol/L Final   • proBNP 08/07/2019 193.2  5.0-1,800.0 pg/mL Final   • Troponin T 08/07/2019 <0.010  0.000 - 0.030 ng/mL Final   • WBC 08/07/2019 5.52  3.40 - 10.80 10*3/mm3 Final   • RBC 08/07/2019 3.44* 3.77 - 5.28 10*6/mm3 Final   • Hemoglobin 08/07/2019 10.1* 12.0 - 15.9 g/dL Final   • Hematocrit 08/07/2019 32.6* 34.0 - 46.6 % Final   • MCV 08/07/2019 94.8  79.0 -  97.0 fL Final   • MCH 08/07/2019 29.4  26.6 - 33.0 pg Final   • MCHC 08/07/2019 31.0* 31.5 - 35.7 g/dL Final   • RDW 08/07/2019 14.6  12.3 - 15.4 % Final   • RDW-SD 08/07/2019 50.4  37.0 - 54.0 fl Final   • MPV 08/07/2019 11.2  6.0 - 12.0 fL Final   • Platelets 08/07/2019 114* 140 - 450 10*3/mm3 Final   • Neutrophil % 08/07/2019 73.0  42.7 - 76.0 % Final   • Lymphocyte % 08/07/2019 21.0  19.6 - 45.3 % Final   • Monocyte % 08/07/2019 5.0  5.0 - 12.0 % Final   • Eosinophil % 08/07/2019 1.0  0.3 - 6.2 % Final   • Neutrophils Absolute 08/07/2019 4.03  1.70 - 7.00 10*3/mm3 Final   • Lymphocytes Absolute 08/07/2019 1.16  0.70 - 3.10 10*3/mm3 Final   • Monocytes Absolute 08/07/2019 0.28  0.10 - 0.90 10*3/mm3 Final   • Eosinophils Absolute 08/07/2019 0.06  0.00 - 0.40 10*3/mm3 Final   • RBC Morphology 08/07/2019 Normal  Normal Final   • WBC Morphology 08/07/2019 Normal  Normal Final   • Platelet Morphology 08/07/2019 Normal  Normal Final       DIAGNOSTICS:  Ct Head Without Contrast    Result Date: 8/7/2019  Cortical atrophy and evidence of mild small vessel chronic ischemic change as described. No acute intracranial abnormality is identified.  This report was finalized on 8/7/2019 5:06 PM by Dr. Aleks Vences M.D.             Results Review:   I reviewed the patient's new clinical results.  Discussed with ER physician  I personally viewed and interpreted the patient's EKG/Telemetry data sinus rhythm  Old records requested from [facility] in [facility]    ASSESSMENT AND PLAN     + Syncope  -Unclear of the etiology if patient overexerted (had gone out and not in her hair done which she has not been out much) versus vasovagal versus neuro versus cardiac  -We will continue to monitor on telemetry, check cardiac enzyme, check orthostatics,  -We will give IV fluids  -neuro checks    + Left facial droop  -MRI and MRA ordered  -We will do the further stroke workup and treatment per order set     Aspirin 325mg/ Lipitor  80mg   Hydrate- 500 cc normal saline bolus   Neurochecks   Check Lipid panel, Hgb A1C   TTE   EKG Tele   PT/OT/ST   Stroke Education    +  Hypertension  -Elevated with 155/143 in the ER.  -Patient is on benazepril and amlodipine at home will hold for now until MRI and stroke is ruled out     + Hyperlipidemia-patient is on Lipitor 40, high-dose statin ordered for plaque stabilization, check fasting lipid profile in the morning     + History of peptic ulcer-stable no evidence of exacerbation patient had years ago but wanted to make us aware.     + Weight loss, abnormal-sounds like patient was not going to the dining bell and started to lose weight and that is why daughter moved the patient to live with her here.  Will get nutrition to evaluate    + CKD 3 with a creatinine of 1.22-stable adjust meds as needed    + Hypercalcemia-we will give IV fluids and check an ionized calcium in the morning    +DVT proph: scd  +Medical decision maker: daughter lore (at bedside)  I discussed the patients findings and my recommendations with the patient and/or family.  Please reference all orders placed.    Farhana Richey MD  08/07/19  8:02 PM

## 2019-08-08 NOTE — PLAN OF CARE
Problem: Patient Care Overview  Goal: Plan of Care Review  Outcome: Ongoing (interventions implemented as appropriate)      Problem: Fall Risk (Adult)  Goal: Identify Related Risk Factors and Signs and Symptoms  Outcome: Ongoing (interventions implemented as appropriate)    Goal: Absence of Fall  Outcome: Ongoing (interventions implemented as appropriate)      Problem: Stroke (Ischemic) (Adult)  Goal: Signs and Symptoms of Listed Potential Problems Will be Absent, Minimized or Managed (Stroke)  Outcome: Ongoing (interventions implemented as appropriate)      Problem: Confusion, Acute (Adult)  Goal: Identify Related Risk Factors and Signs and Symptoms  Outcome: Ongoing (interventions implemented as appropriate)    Goal: Cognitive/Functional Impairments Minimized  Outcome: Ongoing (interventions implemented as appropriate)    Goal: Safety  Outcome: Ongoing (interventions implemented as appropriate)

## 2019-08-09 NOTE — PROGRESS NOTES
Case Management Discharge Note    Final Note: DC'd home with her dtr    Destination      No service has been selected for the patient.      Durable Medical Equipment      No service has been selected for the patient.      Dialysis/Infusion      No service has been selected for the patient.      Home Medical Care      No service has been selected for the patient.      Therapy      No service has been selected for the patient.      Community Resources      No service has been selected for the patient.             Final Discharge Disposition Code: 01 - home or self-care

## 2019-08-09 NOTE — PLAN OF CARE
Problem: Patient Care Overview  Goal: Plan of Care Review   08/09/19 2675   OTHER   Outcome Summary AOx4,VSS, NIH=2, no neuro changes observed since initial assess. CTA and labs pending this AM. Patient denies pain. No s/s of distress observed. Will cont to monitor.

## 2019-08-09 NOTE — THERAPY EVALUATION
Acute Care - Occupational Therapy Initial Evaluation  [facility]     Patient Name: Maru Richey  : 1925  MRN: [id number]  Today's Date: 2019             Admit Date: 2019       ICD-10-CM ICD-9-CM   1. Syncope, unspecified syncope type R55 780.2   2. Anemia, unspecified type D64.9 285.9     Patient Active Problem List   Diagnosis   • Syncope   • Hypertension   • Hyperlipidemia   • History of peptic ulcer   • Weight loss, abnormal     Past Medical History:   Diagnosis Date   • Hyperlipidemia    • Hypertension    • Iron deficiency      Past Surgical History:   Procedure Laterality Date   • HYSTERECTOMY            OT ASSESSMENT FLOWSHEET (last 12 hours)      Occupational Therapy Evaluation     Row Name 19 0902                   OT Evaluation Time/Intention    Subjective Information  no complaints  -MR        Document Type  evaluation  -MR        Mode of Treatment  occupational therapy  -MR        Patient Effort  good  -MR        Symptoms Noted During/After Treatment  none  -MR           General Information    Patient Profile Reviewed?  yes  -MR        Patient Observations  alert;cooperative;agree to therapy  -MR        Patient/Family Observations  pt supine in bed, no acute distress  -MR        Prior Level of Function  independent:;dressing dtr assists with showers as needed  -MR        Equipment Currently Used at Home  cane, quad  -MR        Existing Precautions/Restrictions  fall  -MR        Benefits Reviewed  patient:  -MR           Relationship/Environment    Lives With  child(latricia), adult  -MR           Cognitive Assessment/Intervention- PT/OT    Orientation Status (Cognition)  oriented x 3  -MR        Follows Commands (Cognition)  follows one step commands  -MR           Bed Mobility Assessment/Treatment    Bed Mobility Assessment/Treatment  supine-sit-supine  -MR        Supine-Sit-Supine [place] (Bed Mobility)  minimum assist (75% patient effort);verbal cues  -MR           Functional  Mobility    Functional Mobility- Ind. Level  contact guard assist;minimum assist (75% patient effort)  -MR        Functional Mobility- Device  quad cane  -MR        Functional Mobility- Comment  pt performs functional mobility to the bathroom, to the sink  -MR           Transfer Assessment/Treatment    Transfer Assessment/Treatment  sit-stand transfer;stand-sit transfer;toilet transfer  -MR           Sit-Stand Transfer    Sit-Stand [place] (Transfers)  contact guard  -MR        Assistive Device (Sit-Stand Transfers)  cane, quad  -MR           Stand-Sit Transfer    Stand-Sit [place] (Transfers)  contact guard  -MR        Assistive Device (Stand-Sit Transfers)  cane, quad  -MR           Toilet Transfer    [place] Level (Toilet Transfer)  minimum assist (75% patient effort)  -MR        Assistive Device (Toilet Transfer)  cane, quad;grab bars/safety frame  -MR           ADL Assessment/Intervention    BADL Assessment/Intervention  lower body dressing;grooming;toileting  -MR           Lower Body Dressing Assessment/Training    Lower Body Dressing [place] Level  doff;don;socks;supervision;verbal cues  -MR        Lower Body Dressing Position  edge of bed sitting  -MR           Grooming Assessment/Training    [place] Level (Grooming)  wash face, hands;contact guard assist  -MR        Comment (Grooming)  standing at sink  -MR           Toileting Assessment/Training    [place] Level (Toileting)  toileting skills;contact guard assist  -MR        Assistive Devices (Toileting)  grab bar/safety frame  -MR           General ROM    GENERAL ROM COMMENTS  shoulders 1/2 to 3/4 range, remaining UE joints WFL  -MR           MMT (Manual Muscle Testing)    General MMT Comments  shoulders 3-/5; remaining UE joints > or = 3/5  -MR           Motor Assessment/Interventions    Additional Documentation  -- UE coordination intact for ADLs  -MR           Positioning and Restraints    Pre-Treatment Position  in bed   -MR        Post Treatment Position  bed  -MR        In Bed  supine;notified nsg;call light within reach;encouraged to call for assist;exit alarm on  -MR           Pain Scale: Numbers Pre/Post-Treatment    Pain Scale: Numbers, Pretreatment  0/10 - no pain  -MR        Pain Scale: Numbers, Post-Treatment  0/10 - no pain  -MR           Clinical Impression (OT)    Criteria for Skilled Therapeutic Interventions Met (OT Eval)  yes;treatment indicated  -MR        Rehab Potential (OT Eval)  good, to achieve stated therapy goals  -MR        Therapy Frequency (OT Eval)  -- 3-5x/week  -MR        Anticipated Discharge Disposition (OT)  home with assist;home with home health  -MR           OT Goals    Transfer Goal Selection (OT)  transfer, OT goal 1  -MR        Toileting Goal Selection (OT)  toileting, OT goal 1  -MR        Functional Mobility Goal Selection (OT)  functional mobility, OT goal 1  -MR        Additional Documentation  Functional Mobility Selection (OT) (Row)  -MR           Transfer Goal 1 (OT)    Activity/Assistive Device (Transfer Goal 1, OT)  toilet;bed-to-chair/chair-to-bed;cane, quad  -MR        [place] Level/Cues Needed (Transfer Goal 1, OT)  supervision required  -MR        Time Frame (Transfer Goal 1, OT)  long term goal (LTG);by discharge  -MR           Toileting Goal 1 (OT)    Activity/Device (Toileting Goal 1, OT)  toileting skills, all;grab bar/safety frame  -MR        [place] Level/Cues Needed (Toileting Goal 1, OT)  supervision required  -MR        Time Frame (Toileting Goal 1, OT)  long term goal (LTG);by discharge  -MR           Functional Mobility Goal 1 (OT)    Activity/Assistive Device (Functional Mobility Goal 1, OT)  cane, quad  -MR        [place] Level/Cues Needed (Functional Mobility Goal 1, OT)  supervision required  -MR        Distance Goal 1 (Functional Mobility, OT)  pt to perform functional mobility to the bathroom, to the sink  -MR        Time Frame (Functional Mobility  Goal 1, OT)  long term goal (LTG);by discharge  -MR          User Key  (r) = Recorded By, (t) = Taken By, (c) = Cosigned By    Initials Name Effective Dates    Danielle Chambers, OT 06/22/16 -          Occupational Therapy Education     Title: PT OT SLP Therapies (Done)     Topic: Occupational Therapy (Done)     Point: ADL training (Done)     Description: Instruct learner(s) on proper safety adaptation and remediation techniques during self care or transfers.   Instruct in proper use of assistive devices.    Learning Progress Summary           Patient Acceptance, E,TB, VU by MR at 8/9/2019 10:02 AM                               User Key     Initials Effective Dates Name Provider Type Discipline    MR 06/22/16 -  Danielle Owens, OT Occupational Therapist OT                  OT Recommendation and Plan  Outcome Summary/Treatment Plan (OT)  Anticipated Discharge Disposition (OT): home with assist, home with home health  Therapy Frequency (OT Eval): (3-5x/week)  Plan of Care Review  Plan of Care Reviewed With: patient  Plan of Care Reviewed With: patient  Outcome Summary: Pt seen for initial evaluation, recommend skilled OT services to increase safety and independence with performance of ADLs.    Outcome Measures     Row Name 08/09/19 1000             How much help from another is currently needed...    Putting on and taking off regular lower body clothing?  3  -MR      Bathing (including washing, rinsing, and drying)  3  -MR      Toileting (which includes using toilet bed pan or urinal)  3  -MR      Putting on and taking off regular upper body clothing  3  -MR      Taking care of personal grooming (such as brushing teeth)  3  -MR      Eating meals  3  -MR      AM-PAC 6 Clicks Score (OT)  18  -MR         Modified Merrick Scale    Modified Merrick Scale  4 - Moderately severe disability.  Unable to walk without assistance, and unable to attend to own bodily needs without assistance.  -MR         Functional  Assessment    Outcome Measure Options  AM-PAC 6 Clicks Daily Activity (OT)  -MR        User Key  (r) = Recorded By, (t) = Taken By, (c) = Cosigned By    Initials Name Provider Type    Danielle Chambers Frances, OT Occupational Therapist          Time Calculation:   Time Calculation- OT     Row Name 08/09/19 1004             Time Calculation- OT    OT Start Time  0931  -MR      OT Stop Time  0947  -MR      OT Time Calculation (min)  16 min  -MR      Total Timed Code Minutes- OT  8 minute(s)  -MR      OT Received On  08/09/19  -MR      OT Goal Re-Cert Due Date  08/16/19  -MR        User Key  (r) = Recorded By, (t) = Taken By, (c) = Cosigned By    Initials Name Provider Type    Danielle Chambers Frances, OT Occupational Therapist        Therapy Charges for Today     Code Description Service Date Service Provider Modifiers Qty    [id number]  OT EVAL MOD COMPLEXITY 2 8/9/2019 Danielle Owens OT GO 1    [id number]  OT SELF CARE/MGMT/TRAIN EA 15 MIN 8/9/2019 Danielle Owens OT GO 1               Danielle Owens OT  8/9/2019

## 2019-08-09 NOTE — PROGRESS NOTES
"DOS: 2019  NAME: Maru Richey   : 1925  PCP: Mirna Richmond MD  Chief Complaint   Patient presents with   • Syncope     CC: Syncope vs TIA    Subjective: No new events overnight per RN. Patient denies headache or any new stroke/TIA symptoms. She denies any recurrent syncope or feelings of dizziness. No seizure-like activity. No family at bedside currently    Interval History  History taken from: patient chart RN    Objective:  Vital signs: /76 (BP Location: Right arm, Patient Position: Lying)   Pulse 87   Temp 97.3 °F (36.3 °C) (Oral)   Resp 18   Ht 160 cm (63\")   Wt 54.3 kg (119 lb 9.6 oz)   SpO2 100%   BMI 21.19 kg/m²       Physical Exam:  GENERAL: NAD, thin, elderly, frail  HEENT: Normocephalic, atraumatic   COR: RRR  Resp: Even and unlabored  Extremities: Equal pulses, no signs of distal embolization. Mild BLE edema    Neurological:   MS: Drowsy but easily arousable. Oriented. Language normal. No neglect. Higher integrative function normal  CN: II-XII normal  Motor: Strength at least 4+/5 and symmetric. Decreased bulk of BUE.  Sensory: Intact to light touch in all extremities.   Station and Gait: Unable get OOB without assistance.  Coordination: Normal finger to nose and heel to shin.    Current Medications:  Scheduled Medications:    amLODIPine-lisinopril 2.5-10 mg combo dose  Oral Q24H   aspirin 81 mg Oral Daily   [START ON 8/10/2019] atorvastatin 20 mg Oral Daily   sodium chloride 3 mL Intravenous Q12H     Infusions:    PRN Medications:  •  acetaminophen  •  docusate sodium  •  docusate sodium  •  fluticasone  •  magnesium hydroxide  •  nitroglycerin  •  ondansetron  •  sodium chloride    Medications Reviewed: Changes made    Laboratory results:  Lab Results   Component Value Date    GLUCOSE 82 2019    CALCIUM 10.0 (H) 2019     2019    K 4.8 2019    CO2 24.6 2019     2019    BUN 24 (H) 2019    CREATININE 0.89 2019    " EGFRIFAFRI 72 08/09/2019    EGFRIFNONA 41 (L) 08/07/2019    BCR 27.0 (H) 08/09/2019    ANIONGAP 8.4 08/09/2019     Lab Results   Component Value Date    WBC 5.26 08/09/2019    HGB 9.7 (L) 08/09/2019    HCT 32.5 (L) 08/09/2019    MCV 97.3 (H) 08/09/2019     (L) 08/09/2019      Results from last 7 days   Lab Units 08/08/19  0518   CHOLESTEROL mg/dL 122     No results found for: INR, PROTIME  Lab Results   Component Value Date    TSH 1.160 08/08/2019     Lab Results   Component Value Date    HGBA1C 5.50 08/08/2019     Lab Results   Component Value Date    CHOL 122 08/08/2019    TRIG 37 08/08/2019    HDL 49 08/08/2019    LDL 66 08/08/2019      No results found for: [id number]    Results Review:     I reviewed the patient's new clinical results.  I reviewed the patient's new imaging results and agree with the interpretation.  CT head 8/7/19: IMPRESSION:  Cortical atrophy and evidence of mild small vessel chronic  ischemic change as described. No acute intracranial abnormality is  Identified.  MRI brain 8/8/19: IMPRESSION:  Age-appropriate atrophy and mild-to-moderate small vessel  ischemic disease is noted. There is no evidence of acute infarction,  mass or of abnormal enhancement.  MRA head 8/8/19: IMPRESSION:   1. Areas of attenuated signal involving the right M1 segment distally  and involving the vertebral arteries bilaterally at the skull base, more  so on the right. These areas of signal loss are likely artifactual as on  the postcontrast MRA of the neck there was no evidence of an underlying  stenosis at the skull base.   2. There is a 1 to 1.5 mm protuberance suspicious for aneurysm related  to the left A1-A2 junction. Further evaluation could be performed with a  CT angiogram of the head to assess the suspected aneurysm at the left  A1-A2 junction. A CT angiogram would provide further evaluation of the  right M1 segment and of the vertebral arteries bilaterally to exclude  underlying stenosis.  3. There  is irregularity involving the cavernous ICA on the left  medially. There is no evidence of stenosis. The irregularity may be  secondary to atherosclerotic disease, although a shallow aneurysm (1-2  mm from neck to apex) cannot be entirely excluded. This does not project  into the subarachnoid space.  MRA neck 8/8/19: IMPRESSION:   1.  Bovine configuration of the great vessels.   2. Irregularity involving the carotid bifurcations bilaterally but with  minimal stenosis of the internal carotid arteries using NASCET criteria.  3. There is approximately 40% stenosis of the distal aspect of the  common carotid artery on the right.  CTA head/neck 8/9/19: IMPRESSION:  1. The CT angiogram demonstrates complex anatomy involving the left  A1-A2 junction with what appears to be fenestration and infundibular  prominence of the anterior communicating artery and the left A2 segment  proximally. There is no evidence of a saccular or berry aneurysm.  2. No evidence of stenosis of the right M1 segment or of the vertebral  arteries. Signal loss on the MRA is artifactual as suspected secondary  to in-plane artifact and tortuosity.  3. Atherosclerotic disease involving the carotid bifurcations more  prominent on the right where there is moderate vascular calcification,  moderate irregularity and mild stenosis. Atherosclerotic disease without  stenosis is appreciated involving the cavernous sinuses bilaterally more  prominent on the left.     TTE 8/8/19: Interpretation Summary   · Left ventricular systolic function is normal. Calculated EF = 61.0%. Estimated EF was in agreement with the calculated EF. Normal left ventricular cavity size noted. All left ventricular wall segments contract normally.  · There is mild concentric LVH, with moderate asymmetric septal hypertrophy; there is no evidence of obstruction. Left ventricular diastolic dysfunction is noted (grade I a w/high LAP) consistent with impaired relaxation.  · Left atrial cavity  size is mildly dilated. Saline test results are negative.  · There is moderate calcification of the aortic valve.No significant aortic valve regurgitation is present. No hemodynamically significant aortic valve stenosis is present.     EKG 8/7/19: SR    Impression: Ms. Richey is a 89 yo female with HTN, HLD who recently moved from [place] to live with her daughter d/t increasing frailty who presented with a 10-minute episode of LOC and associated left facial droop. Symptoms resolved after beind laid on gurney by EMS without any residuals.  Her imaging including MRI, MRA head neck shows generalized atrophy and white matter disease, vessels show 40% distal common carotid stenosis and ?aneurysm at A1-A2 junction.  Evaluation by CTA shows likely fenestration and infundibular prominence, no evidence of aneurysm. TTE shows LVEF 61%, mildly dilated LA, no PFO.     Based on history patient likely most had orthostatic syncope with mild right hemispheric ischemia from her carotid stenosis.  Clinically she has remained at her baseline.  Recommend she continue low-dose aspirin daily and low-dose statin.  No further work-up at this time.  Please call with questions or concerns.      Plan:  Continue ASA 81 mg, Lipitor 20 mg  Hydrate  Neurochecks  Non-pharmacological DVT prophylaxis  EKG Tele  PT/OT/ST  Stroke Education  Blood pressure control to <130/80  Goal LDL <70-recommend high dose statins-   Serum glucose < 140    I have discussed the above with Dr. Bravo, RN, patient and family.  Ryanne Schaefer, APRN  08/09/19  12:34 PM        Syncope    Hypertension    Hyperlipidemia    History of peptic ulcer    Weight loss, abnormal

## 2019-08-09 NOTE — DISCHARGE SUMMARY
[facility]    Date of Discharge:  8/9/2019    PCP: Mirna Richmond MD    Discharge Diagnosis:   Active Hospital Problems    Diagnosis  POA   • Syncope [R55]  Yes   • Hypertension [I10]  Yes   • Hyperlipidemia [E78.5]  Yes   • History of peptic ulcer [Z87.11]  Not Applicable   • Weight loss, abnormal [R63.4]  Yes      Resolved Hospital Problems   No resolved problems to display.      Consults     Date and Time Order Name Status Description    8/8/2019 1350 Inpatient Neurology Consult General Completed     8/7/2019 1725 [facility] (on-call MD unless specified) Details Completed         Hospital Course  Please see history and physical for details. Patient is a 89 y.o. female recently moved here from [place] to live with her daughter admitted for a syncopal episode with associated left facial droop.  Neurology felt based on her history she likely had orthostatic syncope and had mild right hemispheric ischemia from her carotid stenosis and they recommended that she be on aspirin low-dose and low-dose statin.  They did not recommend any further work-up.    Work-up included MRI/MRA no infarct but had some questionable aneurysms however CTA showed likely fenestration infundibular prominence without evidence of aneurysm.  TTE showed EF of 61%, mildly dilated LA, no PFO.    She had mildly elevated calcium that improved slightly with some hydration.  PTH was high normal she may have some primary hyperparathyroidism.  Would recommend close follow-up and repeating labs when she follows up with primary care physician    I discussed the patient's findings and my recommendations with patient and nursing staff.    Condition on Discharge: Improved.  She would like to go home today.    Temp:  [97.3 °F (36.3 °C)-98.3 °F (36.8 °C)] 97.3 °F (36.3 °C)  Heart Rate:  [77-87] 87  Resp:  [18] 18  BP: (101-145)/(50-76) 145/76  Body mass index is 21.19 kg/m².    Physical Exam    Constitutional: She is oriented to person, place, and time. No distress.   Cardiovascular: Normal rate and regular rhythm.   Pulmonary/Chest: Effort normal and breath sounds normal. No respiratory distress.   Abdominal: Soft. Bowel sounds are normal. There is no tenderness. There is no rebound and no guarding.   Musculoskeletal: She exhibits no edema.   Neurological: She is alert and oriented to person, place, and time.   Skin: Skin is warm and dry.   Psychiatric: She has a normal mood and affect. Her behavior is normal.        Discharge Medications      New Medications      Instructions Start Date   aspirin 81 MG chewable tablet   81 mg, Oral, Daily   Start Date:  8/10/2019        Changes to Medications      Instructions Start Date   atorvastatin 20 MG tablet  Commonly known as:  LIPITOR  What changed:    · medication strength  · how much to take  · when to take this   20 mg, Oral, Daily   Start Date:  8/10/2019        Continue These Medications      Instructions Start Date   amLODIPine-benazepril 2.5-10 MG per capsule  Commonly known as:  LOTREL 2.5-10   1 capsule, Oral, Daily      ferrous gluconate 240 (27 FE) MG tablet  Commonly known as:  FERGON   240 mg, Oral, 2 Times Daily      fluticasone 50 MCG/ACT nasal spray  Commonly known as:  FLONASE   1 spray, Nasal, Daily PRN            Diet Instructions     Diet: Regular, Cardiac      Discharge Diet:   Regular  Cardiac            Activity Instructions     Activity as Tolerated           Additional Instructions for the Follow-ups that You Need to Schedule     Call MD for problems / concerns.   As directed        Follow-up Information     Mirna Richmond MD Follow up in 1 day(s).    Specialty:  Family Medicine  Why:  Follow up with PCP for new patient appt on Wednesday 8/21/19 @ 2:30   Contact information:  [address] [address]  [phone]                  [facility] Job Lee MD  08/09/19  12:59 PM    Discharge time spent greater  than 30 minutes.

## 2019-08-09 NOTE — PLAN OF CARE
Problem: Patient Care Overview  Goal: Plan of Care Review   08/09/19 0902   Coping/Psychosocial   Plan of Care Reviewed With patient   OTHER   Outcome Summary Pt. will benefit from skilled inpt. P.T. to address her functional deficits and to assist pt. in regaining her maximum level of independence with functional mobility.

## 2019-08-09 NOTE — THERAPY EVALUATION
Acute Care - Physical Therapy Initial Evaluation  [facility]     Patient Name: Maru Richey  : 1925  MRN: [id number]  Today's Date: 2019                Admit Date: 2019    Visit Dx:     ICD-10-CM ICD-9-CM   1. Syncope, unspecified syncope type R55 780.2   2. Anemia, unspecified type D64.9 285.9     Patient Active Problem List   Diagnosis   • Syncope   • Hypertension   • Hyperlipidemia   • History of peptic ulcer   • Weight loss, abnormal     Past Medical History:   Diagnosis Date   • Hyperlipidemia    • Hypertension    • Iron deficiency      Past Surgical History:   Procedure Laterality Date   • HYSTERECTOMY          PT ASSESSMENT (last 12 hours)      Physical Therapy Evaluation    No documentation.       Physical Therapy Education     Title: PT OT SLP Therapies (Done)     Topic: Physical Therapy (Done)     Point: Mobility training (Done)     Learning Progress Summary           Patient Acceptance, E,D, VU,NR by MS at 2019  9:01 AM                   Point: Home exercise program (Done)     Learning Progress Summary           Patient Acceptance, E,D, VU,NR by MS at 2019  9:01 AM                   Point: Body mechanics (Done)     Learning Progress Summary           Patient Acceptance, E,D, VU,NR by MS at 2019  9:01 AM                   Point: Precautions (Done)     Learning Progress Summary           Patient Acceptance, E,D, VU,NR by MS at 2019  9:01 AM                               User Key     Initials Effective Dates Name Provider Type Discipline    MS 18 -  Bayron Cuello, PT Physical Therapist PT              PT Recommendation and Plan  Anticipated Discharge Disposition (PT): home with assist, home with home health  Planned Therapy Interventions (PT Eval): balance training, bed mobility training, gait training, home exercise program, patient/family education, postural re-education, ROM (range of motion), strengthening, transfer training  Therapy Frequency (PT Clinical  Impression): daily  Outcome Summary/Treatment Plan (PT)  Anticipated Discharge Disposition (PT): home with assist, home with home health  Plan of Care Reviewed With: patient  Outcome Summary: Pt. will benefit from skilled inpt. P.T. to address her functional deficits and to assist pt. in regaining her maximum level of independence with functional mobility.      Time Calculation:   PT Charges     Row Name 08/09/19 0902             Time Calculation    Start Time  0825  -MS      Stop Time  0840  -MS      Time Calculation (min)  15 min  -MS      PT Received On  08/09/19  -MS      PT - Next Appointment  08/10/19  -MS      PT Goal Re-Cert Due Date  08/14/19  -MS         Time Calculation- PT    Total Timed Code Minutes- PT  13 minute(s)  -MS        User Key  (r) = Recorded By, (t) = Taken By, (c) = Cosigned By    Initials Name Provider Type    Bayron Bailey, PT Physical Therapist        Therapy Charges for Today     Code Description Service Date Service Provider Modifiers Qty    88802785258 HC PT EVAL LOW COMPLEXITY 1 8/9/2019 Bayron Cuello, PT GP 1    32359495963 HC PT THER PROC EA 15 MIN 8/9/2019 Bayron Cuello, PT GP 1    76210458689 HC PT THER SUPP EA 15 MIN 8/9/2019 Bayron Cuello, PT GP 1          PT G-Codes  Outcome Measure Options: AM-PAC 6 Clicks Basic Mobility (PT), Modified Paula  AM-PAC 6 Clicks Score (PT): 18  Modified Paula Scale: 4 - Moderately severe disability.  Unable to walk without assistance, and unable to attend to own bodily needs without assistance.      Bayron Cuello, PT  8/9/2019

## 2019-08-09 NOTE — PLAN OF CARE
Problem: Patient Care Overview  Goal: Plan of Care Review   08/09/19 1002   Coping/Psychosocial   Plan of Care Reviewed With patient   OTHER   Outcome Summary Pt seen for initial evaluation, recommend skilled OT services to increase safety and independence with performance of ADLs.

## 2019-09-25 PROBLEM — M15.9 OSTEOARTHRITIS OF MULTIPLE JOINTS: Status: ACTIVE | Noted: 2019-01-01

## 2019-09-25 NOTE — PROGRESS NOTES
Maru Richey is a 89 y.o. female.     Chief Complaint   Patient presents with   • Mass     Patient is wanting to establish primary care. She has a bump on her right arm that she wants to be referred to a dermatologist    • Urinary Frequency     Patient has had issues with urinary frequency    • Edema     Patient has swelling on both feet and and ankles right side is worse        HPI     Pt is a pleasant 89 y.o. YO female here for skin lesion, urinary frequency, constipation, lower leg swelling, and skin lesion. She has a history of hyperlipidemia and hypertension. [place] me [place] [facility] [place] here [place] to live with her daughter - Lisa, who accompanies her today.    She recently had a hospitalization for syncope in August, cause thought to be most likely orthostatic syncope. She was found to have mildly elevated calcium and PTH levels which were recommended to be follow up. Her former PCP is in [place] - Dr. Dunne. Of note patient is a very poor historian and has some word finding difficulty, daughter tries to provided history where able but for many of patient's symptoms ist is unclear when they began.     Lump in right axilla - thinks she may have noticed it a couple weeks ago, unsure exactly when    Lump on neck - noticed a couple weeks ago.     Urinary Frequency - patient states that it has been ongoing for many years. Her daughter thinks this is part of why she doesn't drink much water because she is trying to decrease frequency of going to the bathroom. Patient has also been having difficulty with bowel movements. Stool is hard and she has to strain to have a BM and does not have one every day.     Lower extremity swelling - daughter reports patient has been complaining about it over the last week.     Osteoarthrits - significant osteoarthritis of both knees which contributes to immobility, patient uses cane to ambulate but even with this is somewhat unsteady.  Daughter has been told she would most likely qualify for a wheelchair and feels she needs this when she is home alone. Has a lot of difficulty manneuvering up the couple of stairs required to get in to the home, her daughter has tried to piece together a make-shift ramp but patient is still having a lot of trouble with it and it prevents her from getting out of the house and doing things like going to Islam that she would enjoy.     Glaucoma - recently diagnosed, seen by Mary Alice, started on alphagan drops.     The following portions of the patient's history were reviewed and updated as appropriate: allergies, current medications, past family history, past medical history, past social history, past surgical history and problem list.    Review of Systems   Constitutional: Positive for appetite change and fatigue.   HENT: Negative for congestion, postnasal drip and sinus pressure.    Eyes:        Glucoma   Respiratory: Positive for shortness of breath. Negative for apnea and cough.    Cardiovascular: Negative for chest pain.   Gastrointestinal: Negative for abdominal pain and constipation.   Endocrine: Negative for cold intolerance and heat intolerance.   Genitourinary: Positive for frequency and urgency.   Musculoskeletal: Positive for arthralgias and joint swelling.        Feet and ankle swelling    Skin: Negative for rash.        Bump under right arm and bump under her chin    Allergic/Immunologic: Negative for environmental allergies and food allergies.   Neurological: Positive for light-headedness.   Hematological: Negative for adenopathy. Does not bruise/bleed easily.   Psychiatric/Behavioral: Negative for agitation and behavioral problems.       Objective  Vitals:    09/25/19 1251   BP: 110/68   Pulse: 71   Temp: 97.6 °F (36.4 °C)   SpO2: 99%        Physical Exam   Constitutional: She is oriented to person, place, and time. She appears well-developed and well-nourished. No distress.   HENT:   Head:  Normocephalic and atraumatic.   Nose: Nose normal.   Eyes: Conjunctivae are normal. Right eye exhibits no discharge. Left eye exhibits no discharge. No scleral icterus.   Neck: Neck supple.   Firm rubbery mass just below jaw line - submandibular - about two cm right of midline, feels about 1.5 cm in diameter   Cardiovascular: Normal rate and regular rhythm.   No murmur heard.  No pitting edema of lower extremities.    Pulmonary/Chest: Effort normal. No respiratory distress.   Diminished breath sounds bilaterally   Neurological: She is alert and oriented to person, place, and time.   Skin: Skin is warm and dry.   Pedunculated mass in the right axilla, smooth and flesh colored with two areas of hyperpigementation, firm and rubber, non-tender,  No erythema.    Psychiatric: She has a normal mood and affect. Her behavior is normal. Judgment and thought content normal.         Current Outpatient Medications:   •  ALPHAGAN P 0.1 % solution ophthalmic solution, , Disp: , Rfl:   •  ferrous gluconate (FERGON) 240 (27 FE) MG tablet, Take 240 mg by mouth 2 (Two) Times a Day., Disp: , Rfl:   •  fluticasone (FLONASE) 50 MCG/ACT nasal spray, 1 spray into the nostril(s) as directed by provider Daily As Needed for Rhinitis or Allergies., Disp: , Rfl:     Procedures    Lab Results (most recent)     None              Maru was seen today for mass, urinary frequency and edema.    Diagnoses and all orders for this visit:    Hypercalcemia  -     Comprehensive Metabolic Panel    Iron deficiency  -     Iron and TIBC    Tremor  -     Vitamin B12  -     Folate  -     Vitamin D 25 hydroxy    Vitamin D deficiency   -     Vitamin D 25 hydroxy    Skin lesion  -     Ambulatory Referral to Dermatology    Constipation, unspecified constipation type    Immobility  -     Ambulatory Referral to Home Health    Primary osteoarthritis involving multiple joints    Weakness    Hypercalcemia - will recheck calcium level today, if still elevated based on  level may continue to monitor or consider primary hyperparathyroidism and pursue further evaluation and treatment.     Lump in right axilla - benign appearing cyst, will refer to dermatology as patient requests    Lump on neck - most consistent given location and exam that it is an enlarged lymph node, she has been having some congestion recently , may be reactive due to this, will continue to monitor size, if persists or gets larger will need to do an FNA.     Urinary Frequency - chronic ongoing problem, feel that patient's constipation is likely a large contributing factor, patient has a stool softener at home which I instructed her to begin taking, may also try miralax if that does not work.     Lower extremity swelling - no pitting edema on exam or gross swelling, likely due to venous insufficency, encouraged her to do exercises to activate the calf muscles to help with fluid mobilization, may also try to elevate legs and/or get compression stockings.     Osteoarthritis of multiple joints with significant immobility and weakness- impacting both patient's safety as well as her quality of life as she is limited in being able to perform activities and to go outside the home to be a part of community events such as Islam. I will place an order for home health to come out to her home and do a home assessment for medical supplies patient may be eligible for. I also believe patient needs a motorized wheel chair, although she lives with her daughter, her daughter is not able to be at the home at all times and patient does not have the strength to use manual wheelchair.I will fax prescription to [place] to start this process.       Return in about 2 months (around 11/25/2019) for Medicare Wellness.      Elicia Cantor MD

## 2019-10-01 NOTE — TELEPHONE ENCOUNTER
"S/w Esther. I had to edit order to say \"evaluation for motorized wheelchair\"  And faxed it to [phone] attn: Esther. They will contact pt daughter once this is received.  Pt daughter is aware.  "

## 2019-10-01 NOTE — TELEPHONE ENCOUNTER
Pt daughter, Lisa, called following up on the wheelchair order status as she has not heard anything. I s/w Dilcia at [facility]. She is going to contact the company that handles the motorized wheelchairs, [employer] and speak to Esther. She said she will have them reach out to us regarding this.

## 2019-10-07 PROBLEM — S22.070A COMPRESSION FRACTURE OF T10 VERTEBRA (HCC): Status: ACTIVE | Noted: 2019-01-01

## 2019-10-08 PROBLEM — M25.569 KNEE PAIN: Status: ACTIVE | Noted: 2019-01-01

## 2019-10-08 PROBLEM — E86.0 DEHYDRATION: Status: ACTIVE | Noted: 2019-01-01

## 2019-10-08 PROBLEM — N17.9 AKI (ACUTE KIDNEY INJURY) (HCC): Status: ACTIVE | Noted: 2019-01-01

## 2019-10-08 NOTE — PLAN OF CARE
Problem: Patient Care Overview  Goal: Plan of Care Review   10/08/19 8214   Coping/Psychosocial   Plan of Care Reviewed With patient   Plan of Care Review   Progress no change   OTHER   Outcome Summary PATIENT TO ER AFTER FALL AT HOME; CONSERVATIVE TREATMENT FOR T10 COMPRESSION FRACTURE; C/O BACK AND KNEE PAINS; RELIEVED WITH TYLENOL; INCONTINENT OF URINE; DAUGHTER IS TO RETURN TO BEDSIDE IN THE MORNING PER PATINET; DTR WENT HOME AFTER PATINET TRANSFERED TO [facility] FROM ER; RN TO CONTINUE TO MONITOR. NEUROSURGERY HAS BEEN CONSULTED.

## 2019-10-08 NOTE — ED PROVIDER NOTES
EMERGENCY DEPARTMENT ENCOUNTER    Room Number:  P590/1  Date seen:  10/8/2019  Time seen: 8:26 PM  PCP: Elicia Tobar MD  Historian: Patient, family      HPI:  Chief Complaint: Knee pain  A complete HPI/ROS/PMH/PSH/SH/FH are unobtainable due to: None  Context: Maru Richey is a 89 y.o. female who presents to the ED c/o right knee pain that began 3 days ago s/p mechanical fall off the side of her bed. She reports she was reaching to turn off her bedside light, and overreached causing her to fall. Per family member, the patient was not on the floor for very long because the family member heard the patient fall and immediately came to help her off the floor. Patient's family reports the patient has been ambulating since the fall, but she has noticed that the patient's RLE has been mildly internally rotated. Patient denies urinary symptoms, abdominal pain, chest pain, or SOA. Patient reports constipation for a few days. Family reports she has had frequent mechanical falls within the last year.            PAST MEDICAL HISTORY  Active Ambulatory Problems     Diagnosis Date Noted   • Syncope 08/07/2019   • Hypertension 08/07/2019   • Hyperlipidemia 08/07/2019   • History of peptic ulcer 08/07/2019   • Weight loss, abnormal 08/07/2019   • Osteoarthritis of multiple joints 09/25/2019     Resolved Ambulatory Problems     Diagnosis Date Noted   • No Resolved Ambulatory Problems     Past Medical History:   Diagnosis Date   • Hyperlipidemia    • Hypertension    • Iron deficiency          PAST SURGICAL HISTORY  Past Surgical History:   Procedure Laterality Date   • APPENDECTOMY     • HYSTERECTOMY           FAMILY HISTORY  History reviewed. No pertinent family history.      SOCIAL HISTORY  Social History     Socioeconomic History   • Marital status: Single     Spouse name: Not on file   • Number of children: Not on file   • Years of education: Not on file   • Highest education level: Not on file   Tobacco Use   • Smoking  status: Never Smoker   • Smokeless tobacco: Never Used   Substance and Sexual Activity   • Alcohol use: No     Frequency: Never   • Drug use: No         ALLERGIES  Patient has no known allergies.        REVIEW OF SYSTEMS  Review of Systems   Constitutional: Negative for diaphoresis and fever.   HENT: Negative for congestion.    Eyes: Negative for visual disturbance.   Respiratory: Negative for shortness of breath.    Cardiovascular: Negative for chest pain and palpitations.   Gastrointestinal: Positive for constipation. Negative for abdominal pain, blood in stool and vomiting.   Endocrine: Negative for polyuria.   Genitourinary: Negative for flank pain.   Musculoskeletal: Positive for arthralgias (right knee). Negative for joint swelling.   Skin: Negative for wound.   Neurological: Negative for seizures.   Hematological: Negative for adenopathy.   Psychiatric/Behavioral: Negative for sleep disturbance.            PHYSICAL EXAM  ED Triage Vitals   Temp Heart Rate Resp BP SpO2   10/07/19 1807 10/07/19 1807 10/07/19 1807 10/07/19 1807 10/07/19 1807   97.5 °F (36.4 °C) 82 17 146/90 99 %      Temp src Heart Rate Source Patient Position BP Location FiO2 (%)   10/07/19 1807 10/07/19 1954 10/07/19 1954 10/07/19 1954 --   Tympanic Monitor Lying Right arm          GENERAL: Awake and alert, chronically ill-appearing, no acute distress  HENT: nares patent, scalp atraumatic  EYES: no scleral icterus  CV: regular rhythm, normal rate  RESPIRATORY: normal effort, lungs clear to auscultation bilaterally  ABDOMEN: soft, nontender, nondistended  MUSCULOSKELETAL: no deformity.  There is point tenderness of the lower thoracic and upper lumbar spine with no midline step-off.  There is also mild point tenderness over the patella bilaterally with no deformity and no appreciable knee effusion, knee erythema, knee warmth.  There is no significant pain with passive ranging of the knees or hips bilaterally.  2+ DP pulses bilateral lower  extremities.  NEURO: alert, moves all extremities, follows commands  SKIN: warm, dry    Vital signs and nursing notes reviewed.          LAB RESULTS  Recent Results (from the past 24 hour(s))   Basic Metabolic Panel    Collection Time: 10/07/19  9:09 PM   Result Value Ref Range    Glucose 116 (H) 65 - 99 mg/dL    BUN 18 8 - 23 mg/dL    Creatinine 1.09 (H) 0.57 - 1.00 mg/dL    Sodium 136 136 - 145 mmol/L    Potassium 4.8 3.5 - 5.2 mmol/L    Chloride 98 98 - 107 mmol/L    CO2 24.6 22.0 - 29.0 mmol/L    Calcium 10.4 (H) 8.2 - 9.6 mg/dL    eGFR  African Amer 57 (L) >60 mL/min/1.73    BUN/Creatinine Ratio 16.5 7.0 - 25.0    Anion Gap 13.4 5.0 - 15.0 mmol/L   CBC Auto Differential    Collection Time: 10/07/19  9:09 PM   Result Value Ref Range    WBC 6.50 3.40 - 10.80 10*3/mm3    RBC 3.81 3.77 - 5.28 10*6/mm3    Hemoglobin 11.2 (L) 12.0 - 15.9 g/dL    Hematocrit 35.2 34.0 - 46.6 %    MCV 92.4 79.0 - 97.0 fL    MCH 29.4 26.6 - 33.0 pg    MCHC 31.8 31.5 - 35.7 g/dL    RDW 13.1 12.3 - 15.4 %    RDW-SD 44.7 37.0 - 54.0 fl    MPV 10.9 6.0 - 12.0 fL    Platelets 138 (L) 140 - 450 10*3/mm3    Neutrophil % 67.1 42.7 - 76.0 %    Lymphocyte % 14.8 (L) 19.6 - 45.3 %    Monocyte % 17.2 (H) 5.0 - 12.0 %    Eosinophil % 0.2 (L) 0.3 - 6.2 %    Basophil % 0.2 0.0 - 1.5 %    Immature Grans % 0.5 0.0 - 0.5 %    Neutrophils, Absolute 4.37 1.70 - 7.00 10*3/mm3    Lymphocytes, Absolute 0.96 0.70 - 3.10 10*3/mm3    Monocytes, Absolute 1.12 (H) 0.10 - 0.90 10*3/mm3    Eosinophils, Absolute 0.01 0.00 - 0.40 10*3/mm3    Basophils, Absolute 0.01 0.00 - 0.20 10*3/mm3    Immature Grans, Absolute 0.03 0.00 - 0.05 10*3/mm3    nRBC 0.0 0.0 - 0.2 /100 WBC   Basic Metabolic Panel    Collection Time: 10/08/19  6:16 AM   Result Value Ref Range    Glucose 93 65 - 99 mg/dL    BUN 18 8 - 23 mg/dL    Creatinine 0.90 0.57 - 1.00 mg/dL    Sodium 139 136 - 145 mmol/L    Potassium 4.0 3.5 - 5.2 mmol/L    Chloride 103 98 - 107 mmol/L    CO2 27.3 22.0 - 29.0 mmol/L     Calcium 10.0 (H) 8.2 - 9.6 mg/dL    eGFR  African Amer 71 >60 mL/min/1.73    BUN/Creatinine Ratio 20.0 7.0 - 25.0    Anion Gap 8.7 5.0 - 15.0 mmol/L   CBC (No Diff)    Collection Time: 10/08/19  6:16 AM   Result Value Ref Range    WBC 4.87 3.40 - 10.80 10*3/mm3    RBC 3.42 (L) 3.77 - 5.28 10*6/mm3    Hemoglobin 10.0 (L) 12.0 - 15.9 g/dL    Hematocrit 30.8 (L) 34.0 - 46.6 %    MCV 90.1 79.0 - 97.0 fL    MCH 29.2 26.6 - 33.0 pg    MCHC 32.5 31.5 - 35.7 g/dL    RDW 13.1 12.3 - 15.4 %    RDW-SD 43.6 37.0 - 54.0 fl    MPV 10.3 6.0 - 12.0 fL    Platelets 141 140 - 450 10*3/mm3       Ordered the above labs and reviewed the results.        RADIOLOGY  Xr Spine Thoracic 3 View    Result Date: 10/7/2019  5 VIEWS OF THE THORACIC SPINE; 6 VIEWS OF THE LUMBAR SPINE  HISTORY: Mid and low back pain after a fall  COMPARISON: None available.  FINDINGS: Thoracic spine: Examination is significantly degraded by motion artifact and patient positioning. There may be some minimal wedging of the T10 vertebral body. This is actually better seen on the images of the lumbar spine. Fracture is not excluded, given history. MRI would be more sensitive for evaluation.  Lumbar spine: There is thoracolumbar scoliosis, with convexity to the left. No definite acute fracture or subluxation of the lumbar spine seen. 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       1. Questionable subtle height loss involving superior endplate of T10, better seen on images of the lumbar spine. Fracture is not excluded. MRI would be more sensitive for evaluation. 2. No acute fracture or subluxation of the lumbar spine is identified.  This report was finalized on 10/7/2019 10:11 PM by Dr. Kellie Dwyer M.D.      Xr Knee 1 Or 2 View Bilateral    Result Date: 10/7/2019  4 VIEWS OF THE BILATERAL KNEES  HISTORY: Bilateral knee pain after a fall   COMPARISON: None available.  FINDINGS: No acute fracture or subluxation of either knee is identified. Advanced degenerative changes are noted within both knees. These are most significant within the lateral apartments bilaterally. No suprapatellar effusion is seen. Dense vascular calcifications are present.      No acute fracture or subluxation identified.  This report was finalized on 10/7/2019 10:04 PM by Dr. Kellie Dwyer M.D.      Xr Spine Lumbar 4+ View    Result Date: 10/7/2019  5 VIEWS OF THE THORACIC SPINE; 6 VIEWS OF THE LUMBAR SPINE  HISTORY: Mid and low back pain after a fall  COMPARISON: None available.  FINDINGS: Thoracic spine: Examination is significantly degraded by motion artifact and patient positioning. There may be some minimal wedging of the T10 vertebral body. This is actually better seen on the images of the lumbar spine. Fracture is not excluded, given history. MRI would be more sensitive for evaluation.  Lumbar spine: There is thoracolumbar scoliosis, with convexity to the left. No definite acute fracture or subluxation of the lumbar spine seen. 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       1. Questionable subtle height loss involving superior endplate of T10, better seen on images of the lumbar spine. Fracture is not excluded. MRI would be more sensitive for evaluation. 2. No acute fracture or subluxation of the lumbar spine is identified.  This report was finalized on 10/7/2019 10:11 PM by Dr. Kellie Dwyer M.D.        Ordered the above noted radiological studies. Reviewed by me in PACS.            PROCEDURES  Procedures      MEDICATIONS GIVEN IN ER  Medications   sodium chloride 0.9 % flush 10 mL (not administered)   acetaminophen (TYLENOL) tablet 650 mg (650 mg Oral Given 10/8/19 1015)     Or   acetaminophen (TYLENOL) 160 MG/5ML solution 650 mg ( Oral  Not Given:  See Alt 10/8/19 1015)     Or   acetaminophen (TYLENOL) suppository 650 mg ( Rectal Not Given:  See Alt 10/8/19 1015)   ondansetron (ZOFRAN) injection 4 mg (not administered)   sodium chloride 0.9 % infusion (75 mL/hr Intravenous Currently Infusing 10/8/19 1413)   brimonidine (ALPHAGAN P) 0.1 % ophthalmic solution 1 drop (1 drop Both Eyes Not Given 10/8/19 1300)   fluticasone (FLONASE) 50 MCG/ACT nasal spray 1 spray (not administered)       PROGRESS AND CONSULTS  ED Course as of Oct 08 1452   Mon Oct 07, 2019   7482 Obtained further hx from patient's daughter who reports patient was unable to walk as of today because of her pain, required EMS to transport her out of home.  She feels she is unable to care for her at home in this condition.  Call placed to hospitalist for admission.  NSGY also consulted regarding T10 compression fx.   [JR]   2317 Discussed with CORRINE Coto, who agrees to consult.    [JR]   2323 Discussed with LAURIE Smith for [facility], who agrees to admit for Dr. Hernández.   [JR]      ED Course User Index  [JR] Sea Maharaj MD           MEDICAL DECISION MAKING    89-year-old female presents with bilateral knee pain and mid to low back pain after recent fall.  She is now having difficulty walking as a result of the pain.  She has a grossly intact neurologic exam.  Pain films of the knees bilaterally are negative for acute fracture or malalignment.  Plain films of the thoracic and lumbar spine are remarkable for a subtle compression deformity of the superior endplate of T10.  Neurosurgery was consulted and will see the patient tomorrow.  She will be admitted for PT OT evaluation and possible skilled nursing placement as family is currently unable to care for her and her current condition.    MDM  Number of Diagnoses or Management Options     Amount and/or Complexity of Data Reviewed  Clinical lab tests: ordered and reviewed (Creatinine: 1.09)  Tests in the radiology section of  CPT®: ordered and reviewed (XR knee: No acute fracture or subluxation identified.  XR spine: Questionable subtle height loss involving superior endplate of T10, better seen on images of the lumbar spine. Fracture is not excluded. MRI would be more sensitive for evaluation. No acute fracture or subluxation of the lumbar spine is identified.)  Decide to obtain previous medical records or to obtain history from someone other than the patient: yes (Epic)  Obtain history from someone other than the patient: yes (Family)  Review and summarize past medical records: yes (Patient was recently admitted from 8/7/19 - 8/9/19 for syncope.)  Independent visualization of images, tracings, or specimens: yes (Dr. Yuliya MD)    Patient Progress  Patient progress: stable             DIAGNOSIS  Final diagnoses:   Acute bilateral knee pain   Compression fracture of T10 vertebra, initial encounter (CMS/[facility])         DISPOSITION  Admit              Latest Documented Vital Signs:  As of 2:52 PM  BP- 140/87 HR- 77 Temp- 97.7 °F (36.5 °C) (Oral) O2 sat- 95%        --  Documentation assistance provided by hasmukh Kennedy for Dr. FERMIN Maharaj MD.  Information recorded by the scribe was done at my direction and has been verified and validated by me.      Please note that portions of this were completed with a voice recognition program.                         Sandi Kennedy  10/07/19 3933       Sea Maharaj MD  10/08/19 9669

## 2019-10-08 NOTE — DISCHARGE PLACEMENT REQUEST
"Raheem Richey (89 y.o. Female)     Date of Birth Social Security Number Address Home Phone MRN    02/21/1925  [address] [address] [phone] [id number]    Tenriism Marital Status          Yazdanism Single       Admission Date Admission Type Admitting Provider Attending Provider Department, Room/Bed    10/7/19 Emergency [facility]any MD Jackson, Alan David, MD [address], 90/1    Discharge Date Discharge Disposition Discharge Destination                       Attending Provider:  Jatin Ewing MD    Allergies:  No Known Allergies    Isolation:  None   Infection:  None   Code Status:  CPR    Ht:  170.2 cm (67\")   Wt:  49.9 kg (110 lb)    Admission Cmt:  None   Principal Problem:  Compression fracture of T10 vertebra (CMS/[facility]) [S22.070A]                 Active Insurance as of 10/7/2019     Primary Coverage     Payor Plan Insurance Group Employer/Plan Group    MEDICARE MEDICARE A & B      Payor Plan Address Payor Plan Phone Number Payor Plan Fax Number Effective Dates    [address] [phone]  2/1/1990 - None Entered    [place] [address]       Subscriber Name Subscriber Birth Date Member ID       RAHEEM RICHEY 2/21/1925 [id number]                 Emergency Contacts      (Rel.) Home Phone Work Phone Mobile Phone    MissymonLisa (Daughter) [phone] -- --              "

## 2019-10-08 NOTE — CONSULTS
Adult Nutrition  Assessment/PES    Patient Name:  Maru Richey  YOB: 1925  MRN: [id number]  Admit Date:  10/7/2019    Assessment Date:  10/8/2019    Comments:  Consult: JOCELINE, [facility], B=15. Wt index notes 122# in 8/2019 with #. Recently moved from [place], [place] to live with daughter. Lifestyle change has possibly affected intake. Receiving HH diet and Boost BID. Diet liberalized to regular as pt only PMH HTN and HLD. Unable to assess pt needs as anthropometrics not consistent in chart, pt unavailable at time of visit to clarify. Will follow up.     Reason for Assessment     Row Name 10/08/19 1618          Reason for Assessment    Reason For Assessment  nurse/nurse practitioner consult     Diagnosis  neurologic conditions T10 compression fx d/t falls and dehydration with hx of HTN and HLD     Identified At Risk by Screening Criteria  MST SCORE 2+;BMI;reduced oral intake over the last month         Nutrition/Diet History     Row Name 10/08/19 1618          Nutrition/Diet History    Typical Food/Fluid Intake  Consult: JOCELINE [facility], B=15. Wt index notes 122# in 8/2019 with #. Recently moved from [place], [place] to live with daughter. Receiving Boost BID.            Labs/Tests/Procedures/Meds     Row Name 10/08/19 1620          Labs/Procedures/Meds    Lab Results Reviewed  reviewed, pertinent     Lab Results Comments  Hgb, Ca        Diagnostic Tests/Procedures    Diagnostic Test/Procedure Reviewed  reviewed        Medications    Pertinent Medications Reviewed  reviewed, pertinent     Pertinent Medications Comments  75ml/hr IVF         Physical Findings     Row Name 10/08/19 1620          Physical Findings    Overall Physical Appearance  underweight     Skin  other (see comments) B=15           Nutrition Prescription Ordered     Row Name 10/08/19 1621          Nutrition Prescription PO    Current PO Diet  Regular     Fluid Consistency  Thin     Supplement  Boost Plus (Ensure Enlive, Ensure Plus)      Supplement Frequency  2 times a day     Common Modifiers  Cardiac         Problem/Interventions:  Problem 1     Row Name 10/08/19 1621          Nutrition Diagnoses Problem 1    Problem 1  Inadequate Intake/Infusion     Inadequate Intake Type  Oral     Etiology (related to)  Medical Diagnosis;Factors Affecting Nutrition     Neurological  Other (comment) multiple falls     Food Habit/Preferences  Other Lifestyle change     Signs/Symptoms (evidenced by)  BMI     BMI  17 - 17.9         Intervention Goal     Row Name 10/08/19 1623          Intervention Goal    General  Maintain nutrition;Nutrition support treatment;Meet nutritional needs for age/condition;Disease management/therapy     PO  Maintain intake;Meet estimated needs     Weight  Maintain weight         Nutrition Intervention     Row Name 10/08/19 1623          Nutrition Intervention    RD/Tech Action  Care plan reviewd;Follow Tx progress;Encourage intake         Nutrition Prescription     Row Name 10/08/19 1623          Nutrition Prescription PO    PO Prescription  Begin/change diet     Begin/Change Diet to  Regular     Fluid Consistency  Thin     New PO Prescription Ordered?  Yes         Education/Evaluation     Row Name 10/08/19 1623          Education    Education  Will Instruct as appropriate        Monitor/Evaluation    Monitor  Per protocol;PO intake;Supplement intake;Pertinent labs;Weight;Skin status;Symptoms     Education Follow-up  Reinforce PRN           Electronically signed by:  Veronica Fox MS,RD,LD  10/08/19 4:24 PM

## 2019-10-08 NOTE — PLAN OF CARE
Problem: Fall Risk (Adult)  Goal: Identify Related Risk Factors and Signs and Symptoms  Outcome: Outcome(s) achieved Date Met: 10/08/19    Goal: Absence of Fall  Outcome: Ongoing (interventions implemented as appropriate)      Problem: Pain, Acute (Adult)  Goal: Identify Related Risk Factors and Signs and Symptoms  Outcome: Outcome(s) achieved Date Met: 10/08/19    Goal: Acceptable Pain Control/Comfort Level  Outcome: Ongoing (interventions implemented as appropriate)      Problem: Skin Injury Risk (Adult)  Goal: Identify Related Risk Factors and Signs and Symptoms  Outcome: Outcome(s) achieved Date Met: 10/08/19

## 2019-10-08 NOTE — PLAN OF CARE
Problem: Patient Care Overview  Goal: Plan of Care Review   10/08/19 1113   OTHER   Outcome Summary Pt is 89 year old female with admission due to a fall that resulted in a T10 compression fx. Pt cognition limiting subjective hx. She states she lives with her daughter and utilizes a WC and walker for mobility. She reports falling 4 times since moving in with her daughter. She complains of pain in the R knee but is unable to specify intensity. Upon evaluation she was rest in bed. She demonstrates deficits in bed mobility, functional transfers, and ambulation. She has impairments in balance, strength, and activity tolerance. She was able to ambulate to the restroom and then to her chair with min Ax2 for safety. She would benefit from skilled physical therapy in order to address functional deficits stated above. Recommended DC to SNF when medically appropriate.

## 2019-10-08 NOTE — PROGRESS NOTES
Name: Maru Richey ADMIT: 10/7/2019   : 1925  PCP: Elicia Tobar MD    MRN: [id number] LOS: 0 days   AGE/SEX: 89 y.o. female  ROOM: [facility]   Subjective   Chief Complaint   Patient presents with   • Knee Pain     chronic pain resulting in falls - last fall was 2 days ago - no new injury     Knee pain is fair  Back pain is mild  Has had increased falls    ROS  No f/c  No n/v  No cp/palp  No soa/cough    Objective   Vital Signs  Temp:  [97.5 °F (36.4 °C)-98.1 °F (36.7 °C)] 97.8 °F (36.6 °C)  Heart Rate:  [67-86] 67  Resp:  [16-20] 16  BP: (124-147)/(78-98) 144/78  SpO2:  [96 %-99 %] 97 %  on   ;   Device (Oxygen Therapy): room air  Body mass index is 17.23 kg/m².    Physical Exam   Constitutional: She appears well-developed and well-nourished.   HENT:   Head: Normocephalic and atraumatic.   Eyes: Conjunctivae are normal. No scleral icterus.   Neck: Neck supple. No tracheal deviation present.   Cardiovascular: Normal rate and regular rhythm.   No murmur heard.  Pulmonary/Chest: Effort normal and breath sounds normal.   Abdominal: Soft. There is no tenderness. There is no guarding.   Neurological: She is alert. She exhibits normal muscle tone.   Skin: Skin is warm and dry.   Psychiatric: She has a normal mood and affect. Her behavior is normal.       Results Review:       I reviewed the patient's new clinical results.  Results from last 7 days   Lab Units 10/08/19  0616 10/07/19  2109   WBC 10*3/mm3 4.87 6.50   HEMOGLOBIN g/dL 10.0* 11.2*   PLATELETS 10*3/mm3 141 138*     Results from last 7 days   Lab Units 10/08/19  0616 10/07/19  2109   SODIUM mmol/L 139 136   POTASSIUM mmol/L 4.0 4.8   CHLORIDE mmol/L 103 98   CO2 mmol/L 27.3 24.6   BUN mg/dL 18 18   CREATININE mg/dL 0.90 1.09*   GLUCOSE mg/dL 93 116*   Estimated Creatinine Clearance: 30.1 mL/min (by TRE-G formula based on SCr of 0.9 mg/dL).    Results from last 7 days   Lab Units 10/08/19  0616 10/07/19  2109   CALCIUM mg/dL 10.0* 10.4*         Coag      HbA1C   Lab Results   Component Value Date    HGBA1C 5.50 08/08/2019     Infection     Radiology(recent) Xr Spine Thoracic 3 View    Result Date: 10/7/2019   1. Questionable subtle height loss involving superior endplate of T10, better seen on images of the lumbar spine. Fracture is not excluded. MRI would be more sensitive for evaluation. 2. No acute fracture or subluxation of the lumbar spine is identified.  This report was finalized on 10/7/2019 10:11 PM by Dr. Kellie Dwyer M.D.      Xr Knee 1 Or 2 View Bilateral    Result Date: 10/7/2019  No acute fracture or subluxation identified.  This report was finalized on 10/7/2019 10:04 PM by Dr. Kellie Dwyer M.D.      Xr Spine Lumbar 4+ View    Result Date: 10/7/2019   1. Questionable subtle height loss involving superior endplate of T10, better seen on images of the lumbar spine. Fracture is not excluded. MRI would be more sensitive for evaluation. 2. No acute fracture or subluxation of the lumbar spine is identified.  This report was finalized on 10/7/2019 10:11 PM by Dr. Kellie Dwyer M.D.      No results found for: TROPONINT, TROPONINI, BNP  No components found for: TSH;2      brimonidine 1 drop Both Eyes TID       sodium chloride 75 mL/hr Last Rate: 75 mL/hr (10/08/19 0855)   Diet Regular; Cardiac      Assessment/Plan      Active Hospital Problems    Diagnosis  POA   • **Compression fracture of T10 vertebra (CMS/HCC) [S22.070A]  Yes   • Dehydration [E86.0]  Yes   • Knee pain [M25.569]  Unknown   • Hyperlipidemia [E78.5]  Yes   • Hypertension [I10]  Yes      Resolved Hospital Problems   No resolved problems to display.       · PRN agents for pain. ALYSSA evaluation- I will hold off any MRI order to see what ALYSSA thinks if any intervention would even be considered before we get the test  · PT consultation  · IVFs, monitor labs  · Above meds      Reviewed records      Jatin Ewing MD  [facility]  10/08/19  8:44 AM

## 2019-10-08 NOTE — PROGRESS NOTES
Discharge Planning Assessment  [facility]     Patient Name: Maru Richey  MRN: [id number]  Today's Date: 10/8/2019    Admit Date: 10/7/2019    Discharge Needs Assessment     Row Name 10/08/19 1200       Living Environment    Lives With  child(latricia), adult    Name(s) of Who Lives With Patient  Karl: Lisa Hooks    Current Living Arrangements  home/apartment/condo    Primary Care Provided by  self    Provides Primary Care For  no one    Family Caregiver if Needed  child(latricia), adult    Family Caregiver Names  Lisa Hooks daughter [phone](c)    Quality of Family Relationships  helpful;involved;supportive    Able to Return to Prior Arrangements  yes       Resource/Environmental Concerns    Resource/Environmental Concerns  none       Transition Planning    Patient/Family Anticipates Transition to  home with help/services;home with family    Patient/Family Anticipated Services at Transition  home health care Current with [facility]    Transportation Anticipated  family or friend will provide       Discharge Needs Assessment    Equipment Currently Used at Home  cane, quad;grab bar;shower chair;walker, rolling Elevated tiolet seat    Offered/Gave Vendor List  yes [facility]2R and  list of HH        Discharge Plan     Row Name 10/08/19 8275       Plan    Plan  Home with daughter and [facility] (current with)    Patient/Family in Agreement with Plan  yes    Plan Comments  BOOTH noted. Introduced self and role of CCP. Daughter Lisa Hooks [phone](c) at bedside. Patient agreeable to discuss with all present. Face sheet verified. Patient lives at daughter's home. Daughter moved her into her home approx 2 1/2 months ago from [facility].  There are steps to enter the home but a ramp is being completed tomorrow. Patient's bedroom and bath are located on the main level. Patient uses a walk-in shower with shower chair and garb bars available. Has an elevated tiolet seat. Prior to admission, patient was independent with ADL's. Daughter assisted in  cooking and cleaning, Patient has a quad cane and a FW walker. Patient is current with [facility] for PT. Confirmed with Marily/[facility]. Just started therapy last week.  Was to have a evaluation from [facility] today (10/8) for wheelchair. Discussed DC plans and current status. R2R and list of HH provided. Discussed private pay for room/board. CCP to FU on DC plan               Demographic Summary     Row Name 10/08/19 1201       General Information    Admission Type  observation    Arrived From  home    Required Notices Provided  Observation Status Notice    Reason for Consult  discharge planning    Preferred Language  English     Used During This Interaction  no       Contact Information    Permission Granted to Share Info With  family/designee        Functional Status     Row Name 10/08/19 1202       Functional Status    Usual Activity Tolerance  moderate    Current Activity Tolerance  moderate       Functional Status, IADL    Medications  independent    Meal Preparation  assistive person    Housekeeping  assistive person    Laundry  assistive person    Shopping  assistive person       Mental Status    General Appearance WDL  WDL        Psychosocial     Row Name 10/08/19 1202       Values/Beliefs    Spiritual, Cultural Beliefs, Scientologist Practices, Values that Affect Care  no       Behavior WDL    Behavior WDL  WDL       Emotion Mood WDL    Emotion/Mood/Affect WDL  WDL       Speech WDL    Speech WDL  WDL       Coping/Stress    Sources of Support  adult child(latricia)    Reaction to Health Status  adjusting       Developmental Stage (Eriksson's)    Developmental Stage  Stage 8 (65 years-death/Late Adulthood) Integrity vs. Despair        Abuse/Neglect     Row Name 10/08/19 1202       Personal Safety    Feels Unsafe at Home or Work/School  no    Feels Threatened by Someone  no    Does Anyone Try to Keep You From Having Contact with Others or Doing Things Outside Your Home?  no    Physical Signs of Abuse Present  no           Patient Forms     Row Name 10/08/19 1213       Patient Forms    Provider Choice List  Delivered    Delivered to  Patient and daughter    Method of delivery  In person            Roxy Rubio RN

## 2019-10-08 NOTE — THERAPY EVALUATION
Patient Name: Maru Richey  : 1925    MRN: [id number]                              Today's Date: 10/8/2019       Admit Date: 10/7/2019    Visit Dx:     ICD-10-CM ICD-9-CM   1. Compression fracture of T10 vertebra, initial encounter (CMS/formerly [facility]) S22.070A 805.2   2. Acute bilateral knee pain M25.561 338.19    M25.562 719.46     Patient Active Problem List   Diagnosis   • Syncope   • Hypertension   • Hyperlipidemia   • History of peptic ulcer   • Weight loss, abnormal   • Osteoarthritis of multiple joints   • Compression fracture of T10 vertebra (CMS/formerly [facility])   • Dehydration   • Knee pain     Past Medical History:   Diagnosis Date   • Hyperlipidemia    • Hypertension    • Iron deficiency      Past Surgical History:   Procedure Laterality Date   • APPENDECTOMY     • HYSTERECTOMY       General Information     Row Name 10/08/19 1107          PT Evaluation Time/Intention    Document Type  evaluation  (Pended)   -DB     Mode of Treatment  physical therapy  (Pended)   -DB     Row Name 10/08/19 1107          General Information    Patient Profile Reviewed?  yes  (Pended)   -DB     Prior Level of Function  dependent:  (Pended)  Lived w/ daughter. Cognition limiting hx.  -DB     Existing Precautions/Restrictions  fall  (Pended)   -DB     Barriers to Rehab  previous functional deficit;cognitive status  (Pended)   -DB     Row Name 10/08/19 1107          Relationship/Environment    Lives With  child(latricia), adult  (Pended)   -DB     Row Name 10/08/19 1107          Resource/Environmental Concerns    Current Living Arrangements  home/apartment/condo  (Pended)   -DB     Row Name 10/08/19 1107          Cognitive Assessment/Intervention- PT/OT    Orientation Status (Cognition)  oriented x 3  (Pended)   -DB     Row Name 10/08/19 1107          Safety Issues, Functional Mobility    Safety Issues Affecting Function (Mobility)  problem solving;insight into deficits/self awareness  (Pended)   -DB     Impairments Affecting Function (Mobility)   pain;balance;strength;endurance/activity tolerance;cognition  (Pended)   -DB       User Key  (r) = Recorded By, (t) = Taken By, (c) = Cosigned By    Initials Name Provider Type    DB Hugh Jackson, PT Student PT Student        Mobility     Row Name 10/08/19 1108          Bed Mobility Assessment/Treatment    Bed Mobility Assessment/Treatment  bed mobility (all) activities  (Pended)   -DB     [place] Level (Bed Mobility)  minimum assist (75% patient effort)  (Pended)   -DB     Assistive Device (Bed Mobility)  bed rails  (Pended)   -DB     Row Name 10/08/19 1108          Sit-Stand Transfer    Sit-Stand [place] (Transfers)  minimum assist (75% patient effort)  (Pended)   -DB     Assistive Device (Sit-Stand Transfers)  walker, front-wheeled  (Pended)   -DB     Row Name 10/08/19 1108          Gait/Stairs Assessment/Training    Gait/Stairs Assessment/Training  gait/ambulation assistive device  (Pended)   -DB     [place] Level (Gait)  minimum assist (75% patient effort)  (Pended)   -DB     Assistive Device (Gait)  walker, front-wheeled  (Pended)   -DB     Distance in Feet (Gait)  15 ft  (Pended)  bed to restroom to chair  -DB     Deviations/Abnormal Patterns (Gait)  base of support, narrow;stride length decreased;sudha decreased  (Pended)   -DB       User Key  (r) = Recorded By, (t) = Taken By, (c) = Cosigned By    Initials Name Provider Type    Hugh Grewal, PT Student PT Student        Obj/Interventions     Row Name 10/08/19 1109          General ROM    GENERAL ROM COMMENTS  LE 85% WFL  (Pended)   -DB     Row Name 10/08/19 1109          MMT (Manual Muscle Testing)    General MMT Comments  LE grossly 4-/5  (Pended)   -DB     Row Name 10/08/19 1109          Static Sitting Balance    Level of [place] (Unsupported Sitting, Static Balance)  contact guard assist  (Pended)   -DB     Sitting Position (Unsupported Sitting, Static Balance)  sitting on edge of bed  (Pended)   -DB     Row Name 10/08/19  1109          Static Standing Balance    Level of [place] (Supported Standing, Static Balance)  minimal assist, 75% patient effort  (Pended)   -DB     Assistive Device Utilized (Supported Standing, Static Balance)  walker, rolling  (Pended)   -DB     Comment (Supported Standing, Static Balance)  Verbal cuing and AD placement  (Pended)   -DB     Row Name 10/08/19 1109          Sensory Assessment/Intervention    Sensory General Assessment  no sensation deficits identified  (Pended)   -DB       User Key  (r) = Recorded By, (t) = Taken By, (c) = Cosigned By    Initials Name Provider Type    DB Hugh Jackson, PT Student PT Student        Goals/Plan     Row Name 10/08/19 1111          Bed Mobility Goal 1 (PT)    Activity/Assistive Device (Bed Mobility Goal 1, PT)  bed mobility activities, all  (Pended)   -DB     [place] Level/Cues Needed (Bed Mobility Goal 1, PT)  supervision required  (Pended)   -DB     Time Frame (Bed Mobility Goal 1, PT)  1 week  (Pended)   -DB     Row Name 10/08/19 1111          Transfer Goal 1 (PT)    Activity/Assistive Device (Transfer Goal 1, PT)  transfers, all  (Pended)   -DB     [place] Level/Cues Needed (Transfer Goal 1, PT)  supervision required  (Pended)   -DB     Time Frame (Transfer Goal 1, PT)  1 week  (Pended)   -DB     Row Name 10/08/19 1111          Gait Training Goal 1 (PT)    Activity/Assistive Device (Gait Training Goal 1, PT)  gait (walking locomotion);assistive device use;walker, rolling  (Pended)   -DB     [place] Level (Gait Training Goal 1, PT)  supervision required  (Pended)   -DB     Distance (Gait Goal 1, PT)  150ft  (Pended)   -DB     Time Frame (Gait Training Goal 1, PT)  1 week  (Pended)   -DB       User Key  (r) = Recorded By, (t) = Taken By, (c) = Cosigned By    Initials Name Provider Type    Hugh Grewal, PT Student PT Student        Clinical Impression     Row Name 10/08/19 1110          Pain Assessment    Additional Documentation  Pain  Scale: Numbers Pre/Post-Treatment (Group)  (Pended)   -DB     Row Name 10/08/19 1110          Pain Scale: Numbers Pre/Post-Treatment    Pain Location - Side  Right  (Pended)   -DB     Pain Location  knee  (Pended)   -DB     Pre/Post Treatment Pain Comment  Co pain in R knee. Cognition limiting ability to specify intensity.  (Pended)   -DB     Row Name 10/08/19 1110          Plan of Care Review    Plan of Care Reviewed With  patient  (Pended)   -DB     Row Name 10/08/19 1110          Physical Therapy Clinical Impression    Criteria for Skilled Interventions Met (PT Clinical Impression)  yes;treatment indicated  (Pended)   -DB     Row Name 10/08/19 1110          Vital Signs    Pre Patient Position  Supine  (Pended)   -DB     Intra Patient Position  Standing  (Pended)   -DB     Post Patient Position  Sitting  (Pended)   -DB     Row Name 10/08/19 1110          Positioning and Restraints    Pre-Treatment Position  in bed  (Pended)   -DB     Post Treatment Position  chair  (Pended)   -DB     In Chair  reclined;call light within reach;encouraged to call for assist  (Pended)   -DB       User Key  (r) = Recorded By, (t) = Taken By, (c) = Cosigned By    Initials Name Provider Type    DB Hugh Jackson, PT Student PT Student        Outcome Measures     Row Name 10/08/19 1112          How much help from another person do you currently need...    Turning from your back to your side while in flat bed without using bedrails?  3  (Pended)   -DB     Moving from lying on back to sitting on the side of a flat bed without bedrails?  3  (Pended)   -DB     Moving to and from a bed to a chair (including a wheelchair)?  3  (Pended)   -DB     Standing up from a chair using your arms (e.g., wheelchair, bedside chair)?  3  (Pended)   -DB     Climbing 3-5 steps with a railing?  1  (Pended)   -DB     To walk in hospital room?  2  (Pended)   -DB     AM-PAC 6 Clicks Score (PT)  15  (Pended)   -DB     Row Name 10/08/19 1112          Functional  Assessment    Outcome Measure Options  AM-PAC 6 Clicks Basic Mobility (PT)  (Pended)   -       User Key  (r) = Recorded By, (t) = Taken By, (c) = Cosigned By    Initials Name Provider Type    DB Hugh Jackson, PT Student PT Student        Physical Therapy Education     Title: PT OT SLP Therapies (In Progress)     Topic: Physical Therapy (In Progress)     Point: Mobility training (In Progress)     Learning Progress Summary           Patient Acceptance, TB,E,D, NR by DB at 10/8/2019 11:12 AM                   Point: Home exercise program (In Progress)     Learning Progress Summary           Patient Acceptance, TB,E,D, NR by DB at 10/8/2019 11:12 AM                   Point: Body mechanics (In Progress)     Learning Progress Summary           Patient Acceptance, TB,E,D, NR by DB at 10/8/2019 11:12 AM                   Point: Precautions (In Progress)     Learning Progress Summary           Patient Acceptance, TB,E,D, NR by DB at 10/8/2019 11:12 AM                               User Key     Initials Effective Dates Name Provider Type Discipline     08/19/19 -  Hugh Jackson, PT Student PT Student PT              PT Recommendation and Plan  Planned Therapy Interventions (PT Eval): (P) balance training, bed mobility training, gait training, transfer training, postural re-education, strengthening  Outcome Summary/Treatment Plan (PT)  Anticipated Discharge Disposition (PT): (P) skilled nursing facility  Plan of Care Reviewed With: (P) patient  Outcome Summary: (P) Pt is 89 year old female with admission due to a fall that resulted in a T10 compression fx. Pt cognition limiting subjective hx. She states she lives with her daughter and utilizes a WC and walker for mobility. She reports falling 4 times since moving in with her daughter. She complains of pain in the R knee but is unable to specify intensity. Upon evaluation she was rest in bed. She demonstrates deficits in bed mobility, functional transfers, and  ambulation. She has impairments in balance, strength, and activity tolerance. She was able to ambulate to the restroom and then to her chair with min Ax2 for safety. She would benefit from skilled physical therapy in order to address functional deficits stated above. Recommended DC to SNF when medically appropriate.      Time Calculation:   PT Charges     Row Name 10/08/19 1107             Time Calculation    Start Time  1036  (Pended)   -DB      Stop Time  1107  (Pended)   -DB      Time Calculation (min)  31 min  (Pended)   -DB      PT Received On  10/08/19  (Pended)   -DB      PT - Next Appointment  10/09/19  (Pended)   -NEHEMIAH      PT Goal Re-Cert Due Date  10/15/19  (Pended)   -DB        User Key  (r) = Recorded By, (t) = Taken By, (c) = Cosigned By    Initials Name Provider Type    DB Hugh Jackson, PT Student PT Student        Therapy Charges for Today     Code Description Service Date Service Provider Modifiers Qty    47050042391 HC PT EVAL MOD COMPLEXITY 2 10/8/2019 Hugh Jackson, PT Student GP 1    88977728769 HC PT THERAPEUTIC ACT EA 15 MIN 10/8/2019 Hugh Jackson, PT Student GP 1    05293488635 HC PT THER SUPP EA 15 MIN 10/8/2019 Hugh Jackson, PT Student GP 1          PT G-Codes  Outcome Measure Options: (P) AM-PAC 6 Clicks Basic Mobility (PT)  AM-PAC 6 Clicks Score (PT): (P) 15    ALIREZA Echeverria  10/8/2019

## 2019-10-08 NOTE — PLAN OF CARE
Problem: Fall Risk (Adult)  Goal: Absence of Fall  Outcome: Ongoing (interventions implemented as appropriate)   10/08/19 1734   Fall Risk (Adult)   Absence of Fall making progress toward outcome       Problem: Pain, Acute (Adult)  Goal: Acceptable Pain Control/Comfort Level  Outcome: Ongoing (interventions implemented as appropriate)   10/08/19 1734   Pain, Acute (Adult)   Acceptable Pain Control/Comfort Level making progress toward outcome       Problem: Skin Injury Risk (Adult)  Goal: Skin Health and Integrity  Outcome: Ongoing (interventions implemented as appropriate)   10/08/19 1734   Skin Injury Risk (Adult)   Skin Health and Integrity making progress toward outcome       Problem: Patient Care Overview  Goal: Plan of Care Review  Outcome: Ongoing (interventions implemented as appropriate)   10/08/19 1734   Coping/Psychosocial   Plan of Care Reviewed With patient   Plan of Care Review   Progress improving   OTHER   Outcome Summary Pt. alert and mostly oriented, vss, complains of minimal pain, neurosurgery signed off, recommended follow up with ortho however knee xray negative, will continue to monitor. No issues noted.

## 2019-10-08 NOTE — CONSULTS
Inpatient Neurosurgery Consult  Consult performed by: Hattie Gonzáles PA-C  Consult ordered by: Hilda Carrion APRN  Reason for consult: Low back pain with bilateral knee pain  Assessment/Recommendations: Ms. Richey is a 89 yr old with PMH HTN, hyperlipidemia. The patient states that she has been falling more in the past year.  She recently moved from [place] to stay with her daughter here in [place].  She fell several days ago.  She denies any real acute injury but just states that she has had more knee pain since the fall.  She admits to low back pain but states it is much better today than it was yesterday and is currently quite mild.  The back pain is all low lumbar.  She denies any radicular pain but again has bilateral knee pain which is been a chronic problem for her.  To her knowledge she has not had any real formal treatment.  Imaging in the emergency room included x-rays of the knees and thoracic and lumbar spine.  The x-ray suggested the possibility of a mild compression fracture at T10 which would be age-indeterminate.  No other acute findings.  On exam she has tenderness along both knees medially and laterally with mild swelling in both knees.  No significant tenderness to palpation in thoracic or lumbar spine and no focal weakness.    I did review the x-rays with her.  I offered to order an MRI to further evaluate for a spinal fracture but at this point her pain is quite mild and she would prefer to hold off on doing anything.  There is no need to proceed with an MRI unless she develops severe pain or severe radicular pain or weakness.  In regards to the knee pain I would suggest an orthopedic consult.  I would not recommend any bracing for the back given that she is very thin and likely would not tolerate it and given her lack of significant discomfort I think it would create more difficulty than help.    We will sign off call as needed.          Patient Care Team:  Elicia Tobar MD as  PCP - General (Family Medicine)    Chief complaint:T10 VCF/back pain    Subjective     XR T/L spine reviewed as discussed above    ..Lab             10/08/19                       0616          WBC          4.87          HEMOGLOBIN   10.0*         HEMATOCRIT   30.8*         PLATELETS    141               ..Lab             10/08/19                       0616          SODIUM       139           POTASSIUM    4.0           CHLORIDE     103           CO2          27.3          BUN          18            CREATININE   0.90          GLUCOSE      93            CALCIUM      10.0*                 Back Pain   This is a chronic problem. The problem has been gradually improving since onset. The pain is present in the lumbar spine. The pain does not radiate. The pain is at a severity of 3/10. The pain is mild. Pertinent negatives include no bladder incontinence, bowel incontinence, leg pain, perianal numbness or weakness. Risk factors include recent trauma.       Review of Systems   Gastrointestinal: Negative for bowel incontinence.   Genitourinary: Negative for bladder incontinence.   Musculoskeletal: Positive for back pain.   Neurological: Negative for weakness.   All other systems reviewed and are negative.       Past Medical History:   Diagnosis Date   • Hyperlipidemia    • Hypertension    • Iron deficiency    ,   Past Surgical History:   Procedure Laterality Date   • APPENDECTOMY     • HYSTERECTOMY     , History reviewed. No pertinent family history.,   Social History     Tobacco Use   • Smoking status: Never Smoker   • Smokeless tobacco: Never Used   Substance Use Topics   • Alcohol use: No     Frequency: Never   • Drug use: No   ,   Medications Prior to Admission   Medication Sig Dispense Refill Last Dose   • ALPHAGAN P 0.1 % solution ophthalmic solution    10/7/2019 at Unknown time   • Elastic Bandages & Supports (MEDICAL COMPRESSION SOCKS) misc Compression socks due to edema in legs and ankles 2 each 1 10/7/2019 at  Unknown time   • ferrous gluconate (FERGON) 240 (27 FE) MG tablet Take 240 mg by mouth 2 (Two) Times a Day.   10/7/2019 at Unknown time   • fluticasone (FLONASE) 50 MCG/ACT nasal spray 1 spray into the nostril(s) as directed by provider Daily As Needed for Rhinitis or Allergies.   Past Week at Unknown time   , Scheduled Meds:    brimonidine 1 drop Both Eyes TID   , Continuous Infusions:    sodium chloride 75 mL/hr Last Rate: 75 mL/hr (10/08/19 1201)   , PRN Meds:  •  acetaminophen **OR** acetaminophen **OR** acetaminophen  •  fluticasone  •  ondansetron  •  [COMPLETED] Insert peripheral IV **AND** sodium chloride and Allergies:  Patient has no known allergies.    Objective      Vital Signs  Temp:  [97.4 °F (36.3 °C)-98.1 °F (36.7 °C)] 97.4 °F (36.3 °C)  Heart Rate:  [67-86] 86  Resp:  [16-20] 18  BP: (124-149)/(78-98) 149/90    Physical Exam   Constitutional: She is oriented to person, place, and time. She appears well-developed and well-nourished.   HENT:   Head: Normocephalic and atraumatic.   Right Ear: External ear normal.   Left Ear: External ear normal.   Eyes: Conjunctivae and EOM are normal. Pupils are equal, round, and reactive to light. Right eye exhibits no discharge. Left eye exhibits no discharge.   Neck: Normal range of motion. Neck supple. No tracheal deviation present.   Cardiovascular: Intact distal pulses.   Pulmonary/Chest: Effort normal. No stridor. No respiratory distress.   Musculoskeletal: Normal range of motion. She exhibits no edema, tenderness or deformity.   Mild swelling and tenderness in the medial and lateral knee bilaterally.   Neurological: She is alert and oriented to person, place, and time. She has normal strength and normal reflexes. She displays no atrophy, no tremor and normal reflexes. No cranial nerve deficit or sensory deficit. She exhibits normal muscle tone. She displays a negative Romberg sign. She displays no seizure activity. Coordination and gait normal.   No long tract  signs   Skin: Skin is warm and dry.   Psychiatric: She has a normal mood and affect. Her behavior is normal. Judgment and thought content normal.   Nursing note and vitals reviewed.      Results Review:    I reviewed the patient's new clinical results.  I reviewed the patient's new imaging results and agree with the interpretation.        Assessment/Plan       Compression fracture of T10 vertebra (CMS/HCC)    Hypertension    Hyperlipidemia    Dehydration    Knee pain      Assessment:  (Acute increase in low back pain and bilateral knee pain after fall several days ago  Question of possible mild T10 compression fracture on x-ray  Hypertension  History frequent falls.).     Plan:   (As above, the patient currently does not have much back pain and it is not consistent with T10 based on the location.  I do not see any indication for additional work-up and she agrees.  I would defer to orthopedic surgery in regards to any needed work-up or treatment for the knee pain.  We are available as needed if the back pain increases we will otherwise sign off.).       I discussed the patients findings and my recommendations with patient    Hattie Gonzáles PA-C  10/08/19  1:26 PM    Time: 40min

## 2019-10-08 NOTE — H&P
Patient Name:  Maru Richey  YOB: 1925  MRN:  [id number]  Admit Date:  10/7/2019  Patient Care Team:  Elicia Tobar MD as PCP - General (Family Medicine)      Chief Complaint   Patient presents with   • Knee Pain     chronic pain resulting in falls - last fall was 2 days ago - no new injury       Subjective     Ms. Richey is a 89 y.o. female with a history of HLD, HTN that presents to [facility] complaining of knee pain and frequent falls. Patient reports that she experienced a mechanical fall a few nights ago and fell to her buttocks. She states she did hit her head but denies loss of consciousness. She reports that her knees have been hurting since a fall several days ago and now has been unable to walk due to this pain. On arrival to ED tonight, xrays of knees were negative but xray of thoracic spine shows compression fracture of T10. Patient denies fever, chills, chest pain, shortness of breath, abdominal pain, changes in bowel or bladder habits, edema. Labs done tonight were fairly unremarkable with exception of elevated creatinine.    History of Present Illness    Past Medical History:   Diagnosis Date   • Hyperlipidemia    • Hypertension    • Iron deficiency      Past Surgical History:   Procedure Laterality Date   • APPENDECTOMY     • HYSTERECTOMY       No family history on file.  Social History     Tobacco Use   • Smoking status: Never Smoker   • Smokeless tobacco: Never Used   Substance Use Topics   • Alcohol use: No     Frequency: Never   • Drug use: No       (Not in a hospital admission)  Allergies:  No Known Allergies    Review of Systems   Constitutional: Negative for chills and fever.   HENT: Negative.  Negative for congestion and sore throat.    Eyes: Negative.  Negative for visual disturbance.   Respiratory: Negative.  Negative for cough and shortness of breath.    Cardiovascular: Negative.  Negative for chest pain, palpitations and leg swelling.    Gastrointestinal: Negative.  Negative for abdominal pain, diarrhea, nausea and vomiting.   Endocrine: Negative.    Genitourinary: Negative.  Negative for dysuria, frequency and urgency.   Musculoskeletal: Positive for arthralgias (both knees) and gait problem. Negative for myalgias.   Skin: Negative.  Negative for color change, pallor, rash and wound.   Allergic/Immunologic: Negative.    Neurological: Negative for dizziness, weakness and light-headedness.   Hematological: Negative.    Psychiatric/Behavioral: Negative.  Negative for agitation, behavioral problems and confusion.        Objective    Vital Signs  Temp:  [97.5 °F (36.4 °C)] 97.5 °F (36.4 °C)  Heart Rate:  [76-86] 86  Resp:  [16-18] 16  BP: (124-146)/(82-98) 131/98  SpO2:  [96 %-99 %] 96 %  on   ;   Device (Oxygen Therapy): room air  Body mass index is 20.6 kg/m².    Physical Exam   Constitutional: She is oriented to person, place, and time. She appears well-developed and well-nourished. No distress.   HENT:   Head: Normocephalic and atraumatic.   Eyes: EOM are normal.   Neck: Normal range of motion. Neck supple.   Cardiovascular: Normal rate and intact distal pulses.   Pulmonary/Chest: Effort normal. No respiratory distress. She has decreased breath sounds in the right lower field and the left lower field.   Abdominal: Soft. Bowel sounds are normal. She exhibits no distension.   Musculoskeletal: Normal range of motion. She exhibits tenderness (both knees). She exhibits no edema.   Neurological: She is alert and oriented to person, place, and time.   Skin: Skin is warm and dry. She is not diaphoretic.   Psychiatric: She has a normal mood and affect. Her behavior is normal. Judgment and thought content normal.   Nursing note and vitals reviewed.      Results Review:   I reviewed the patient's new clinical results including all labs and xrays.    Lab Results (last 24 hours)     Procedure Component Value Units Date/Time    CBC & Differential [[id number]]  Collected:  10/07/19 2109    Specimen:  Blood Updated:  10/07/19 2115    Narrative:       The following orders were created for panel order CBC & Differential.  Procedure                               Abnormality         Status                     ---------                               -----------         ------                     CBC Auto Differential[[id number]]        Abnormal            Final result                 Please view results for these tests on the individual orders.    Basic Metabolic Panel [[id number]]  (Abnormal) Collected:  10/07/19 2109    Specimen:  Blood Updated:  10/07/19 2133     Glucose 116 mg/dL      BUN 18 mg/dL      Creatinine 1.09 mg/dL      Sodium 136 mmol/L      Potassium 4.8 mmol/L      Chloride 98 mmol/L      CO2 24.6 mmol/L      Calcium 10.4 mg/dL      eGFR  African Amer 57 mL/min/1.73      BUN/Creatinine Ratio 16.5     Anion Gap 13.4 mmol/L     Narrative:       GFR Normal >60  Chronic Kidney Disease <60  Kidney Failure <15    CBC Auto Differential [[id number]]  (Abnormal) Collected:  10/07/19 2109    Specimen:  Blood Updated:  10/07/19 2115     WBC 6.50 10*3/mm3      RBC 3.81 10*6/mm3      Hemoglobin 11.2 g/dL      Hematocrit 35.2 %      MCV 92.4 fL      MCH 29.4 pg      MCHC 31.8 g/dL      RDW 13.1 %      RDW-SD 44.7 fl      MPV 10.9 fL      Platelets 138 10*3/mm3      Neutrophil % 67.1 %      Lymphocyte % 14.8 %      Monocyte % 17.2 %      Eosinophil % 0.2 %      Basophil % 0.2 %      Immature Grans % 0.5 %      Neutrophils, Absolute 4.37 10*3/mm3      Lymphocytes, Absolute 0.96 10*3/mm3      Monocytes, Absolute 1.12 10*3/mm3      Eosinophils, Absolute 0.01 10*3/mm3      Basophils, Absolute 0.01 10*3/mm3      Immature Grans, Absolute 0.03 10*3/mm3      nRBC 0.0 /100 WBC           XR Spine Thoracic 3 View   Final Result       1. Questionable subtle height loss involving superior endplate of T10,   better seen on images of the lumbar spine. Fracture is not excluded. MRI   would be  more sensitive for evaluation.   2. No acute fracture or subluxation of the lumbar spine is identified.       This report was finalized on 10/7/2019 10:11 PM by Dr. Kellie Dwyer M.D.          XR Spine Lumbar 4+ View   Final Result       1. Questionable subtle height loss involving superior endplate of T10,   better seen on images of the lumbar spine. Fracture is not excluded. MRI   would be more sensitive for evaluation.   2. No acute fracture or subluxation of the lumbar spine is identified.       This report was finalized on 10/7/2019 10:11 PM by Dr. Kellie Dwyer M.D.          XR Knee 1 or 2 View Bilateral   Final Result   No acute fracture or subluxation identified.       This report was finalized on 10/7/2019 10:04 PM by Dr. Kellie Dwyer M.D.            Assessment/Plan      Active Hospital Problems    Diagnosis  POA   • **Compression fracture of T10 vertebra (CMS/[facility]) [S22.070A]  Yes   • VENU (acute kidney injury) (CMS/[facility]) [N17.9]  Yes   • Hyperlipidemia [E78.5]  Yes   • Hypertension [I10]  Yes      Resolved Hospital Problems   No resolved problems to display.     Compression fracture  -mechanical fall resulting in T10 compression fx  -continue pain medication PRN  -ALYSSA consult  -PT to evaluate    VENU  -creatinine 1.09 tonight, baseline of 0.8  -avoid further nephrotoxins  -IVF overnight  -recheck labs in AM    HLD/HTN  -stable, continue home medications    VTE Ppx  -SCDs    CODE status  -full    I discussed the patients findings and my recommendations with patient.    LAURIE Joseph  [facility]  10/07/19  11:47 PM

## 2019-10-09 NOTE — DISCHARGE SUMMARY
[facility]    Date of Discharge:  10/9/2019    PCP: Elicia Tobar MD    Discharge Diagnosis:   Active Hospital Problems    Diagnosis  POA   • **Compression fracture of T10 vertebra (CMS/HCC) [S22.070A]  Yes   • Dehydration [E86.0]  Yes   • Knee pain [M25.569]  Unknown   • Hyperlipidemia [E78.5]  Yes   • Hypertension [I10]  Yes      Resolved Hospital Problems   No resolved problems to display.     Procedures Performed       Consults     Date and Time Order Name Status Description    10/8/2019 0009 Inpatient Neurosurgery Consult Completed     10/7/2019 2257 Neurosurgery (on-call MD unless specified) Completed     10/7/2019 2257 LHA (on-call MD unless specified) Details Completed         Hospital Course  Please see history and physical for details. Patient is a 89 y.o. female who recently moved to this area to be closer with her daughter who the patient lives with and is now primary health caretaker.  She has had issues with some recurrent falls and she had a fall couple days prior to admission.  She had increased complaints of knee pain and you can see arthritic changes of her knees but there was no identifiable fracture via x-ray.  She was found to have a T10 vertebral compression fracture and neurosurgery saw in consultation.  She is not really complaining of back pain and no further work-up was done from an ALYSSA standpoint in regards to MRI or further intervention as the patient is felt to be pretty asymptomatic from this.  PT has evaluated and their notes been reviewed.  CCP is helping with discharge issues.  Patient will be discharged from the hospital to home with home health to follow.  I offered additional subacute rehab but apparently that is not fiscally possible per patient.  Ultimately her issues with mobility will continue secondary to advanced age of 89 years.  At this juncture I do not have any further reasons to keep the patient in the  hospital as she is admitted under observation status.  I did reach out discussed the case with the daughter over the phone.  As a result I also reached out to CCP did discuss the case to ensure all discharge needs are coordinated prior to discharge.  Ultimately I fear long-term prognosis is poor.         Condition on Discharge: Improved.     Temp:  [97.4 °F (36.3 °C)-98.9 °F (37.2 °C)] 98.9 °F (37.2 °C)  Heart Rate:  [75-86] 75  Resp:  [16-18] 18  BP: (124-149)/(67-90) 144/87  Body mass index is 17.23 kg/m².    Physical Exam   Constitutional: She is oriented to person, place, and time.   Elderly and frail though interactive and conversational and alert and oriented x3   HENT:   Head: Normocephalic and atraumatic.   Neck: No JVD present.   Cardiovascular: Normal rate and regular rhythm.   Pulmonary/Chest: Effort normal and breath sounds normal. No respiratory distress.   Abdominal: Soft. Bowel sounds are normal. She exhibits no distension. There is no tenderness.   Musculoskeletal: She exhibits no edema.   Arthritic changes in numerous joints including bilateral knees   Neurological: She is alert and oriented to person, place, and time.        Discharge Medications      New Medications      Instructions Start Date   acetaminophen 500 MG tablet  Commonly known as:  TYLENOL   1,000 mg, Oral, Every 8 Hours PRN         Continue These Medications      Instructions Start Date   ALPHAGAN P 0.1 % solution ophthalmic solution  Generic drug:  brimonidine   No dose, route, or frequency recorded.      ferrous gluconate 240 (27 FE) MG tablet  Commonly known as:  FERGON   240 mg, Oral, 2 Times Daily      fluticasone 50 MCG/ACT nasal spray  Commonly known as:  FLONASE   1 spray, Nasal, Daily PRN      Medical Compression Socks misc   Compression socks due to edema in legs and ankles              Additional Instructions for the Follow-ups that You Need to Schedule     Ambulatory Referral to Home Health   As directed      Face to Face  Visit Date:  10/9/2019    Follow-up provider for Plan of Care?:  I treated the patient in an acute care facility and will not continue treatment after discharge.    Follow-up provider:  KOKO TOBAR [[id number]]    Reason/Clinical Findings:  generalized weakness/recurrent falls/genralized OA    Describe mobility limitations that make leaving home difficult:  taxing effort to leave home    Nursing/Therapeutic Services Requested:  Physical Therapy    PT orders:  Therapeutic exercise Gait Training Transfer training Home safety assessment Strengthening    Weight Bearing Status:  As Tolerated    Frequency:  1 Week 1         Discharge Follow-up with PCP   As directed       Currently Documented PCP:    Koko Tobar MD    PCP Phone Number:    [phone]     Follow Up Details:  pcp 2-3 weeks           Follow-up Information     Schedule an appointment as soon as possible for a visit  with Koko Tobar MD.    Specialty:  Family Medicine  Contact information:  [address] [id number] [phone]             Schedule an appointment as soon as possible for a visit  with Hattie Gonzáles PA-C.    Specialty:  Neurosurgery  Contact information:  [address] [address]  [phone]             Koko Tobar MD .    Specialty:  Family Medicine  Why:  pcp 2-3 weeks  Contact information:  [address] [id number] [phone]             [facility] CARE REFERRAL [place] AND [place] .    Specialty:  Home Health Services  Contact information:  [address] [address]               Test Results Pending at Discharge     Brenton Johnston MD  10/09/19  9:55 AM    Discharge time spent greater than 30 minutes.

## 2019-10-09 NOTE — PLAN OF CARE
Problem: Fall Risk (Adult)  Goal: Absence of Fall  Outcome: Ongoing (interventions implemented as appropriate)      Problem: Pain, Acute (Adult)  Goal: Acceptable Pain Control/Comfort Level  Outcome: Ongoing (interventions implemented as appropriate)      Problem: Skin Injury Risk (Adult)  Goal: Skin Health and Integrity  Outcome: Ongoing (interventions implemented as appropriate)      Problem: Patient Care Overview  Goal: Plan of Care Review  Outcome: Ongoing (interventions implemented as appropriate)   10/08/19 1734 10/08/19 1920 10/09/19 0542   Coping/Psychosocial   Plan of Care Reviewed With --  patient --    Plan of Care Review   Progress improving --  --    OTHER   Outcome Summary --  --  No acute distress; tylenol given once for knee pain; patient slept throughout the night; no family at bedside; will continue to monitor.           Adequate: hears normal conversation without difficulty

## 2019-10-09 NOTE — NURSING NOTE
S/w daughter over the phone for DC instructions. Daughter in route to  patient. Will continue to monitor

## 2019-10-09 NOTE — PROGRESS NOTES
Case Management Discharge Note    Final Note: Discharged home with [facility] (lives with daughter). Marily with [facility] notified.         Transportation Services  Private: Car    Final Discharge Disposition Code: 06 - home with home health care

## 2019-10-09 NOTE — PROGRESS NOTES
Continued Stay Note  [facility]     Patient Name: Maru Richey  MRN: [id number]  Today's Date: 10/9/2019    Admit Date: 10/7/2019    Discharge Plan     Row Name 10/09/19 1053       Plan    Plan  Home with daughter and [facility] (current with)    Patient/Family in Agreement with Plan  yes    Plan Comments  Spoke with daughter,Lisa via phone regarding her mother being DC'd today. Lisa stated she had a ramp that was coming today but didn't know what time. Explained to her that she could come at her convience to get her mother and she got upset and hung up. Updated RN who stated she would try to call daughter later. Pateint is to be  DC'd home with [facility].         Discharge Codes    No documentation.       Expected Discharge Date and Time     Expected Discharge Date Expected Discharge Time    Oct 9, 2019             Roxy Rubio, RN

## 2019-10-10 NOTE — OUTREACH NOTE
Spoke with pt daughter, Lisa Hooks, with whom pt now resides. Daughter is very frustrated, mostly with HH and the lack of communication with and by them. HH started last week, but was d/c due to pt hosp admit. Lisa states she was told HH would call this morning to restart P.T. Today but she has not heard from anyone. Pt is very weak, and due to urinary frequency daughter has now had to put bedside commode with pt to help avoid accidents. HH is supposed to be helping get ordered a motorized wheel chair but per Andre there is no order. I believe Dr Cantor already aware and working on order. Daughter declines to sched [facility] at this time, as she does not yet have a ramp and she cannot get pt up/down her steps on by herself.

## 2019-11-01 NOTE — TELEPHONE ENCOUNTER
Pt daughter calling stating pt has a UTI. She was requesting a call from Dr. Cantor regarding this. The only info she would give me is that she did an in home urine test yesterday and it was pos for UTi. Please advise.  Pt daughter - Lisa [phone]

## 2019-11-01 NOTE — TELEPHONE ENCOUNTER
Called and discussed with daughter, patient has been having difficulty with urination for a couple days, has sensation of urinary frequency but sometimes unable to void when she feels like she needs to , also going to the bathroom more often. No dysuria at this time. Very difficult for daughter to transport her out of the house. Will send in antibiotic for UTI, if patient does not have improvement will need to get urine sample even if daughter has to drop it off here, daughter to follow up if symptoms do not improve.

## 2019-11-08 NOTE — TELEPHONE ENCOUNTER
Patient daughter requesting a call back regarding her mom. Can you please call her on [phone]

## 2019-11-08 NOTE — TELEPHONE ENCOUNTER
"I returned patient's daughter's phone call. Lisa says that the patient fell late Monday night/early Tuesday morning. Lisa says that Tuesday the pt finished her round of antibiotics and since then has \"not been acting herself\". She says the patient has been, \"drooling and spitting excessively\" and is asking for Dr. Tobar's opinion.  I will forward message to Dr. Tobar.   "

## 2019-11-09 PROBLEM — N17.9 ACUTE KIDNEY FAILURE (HCC): Status: ACTIVE | Noted: 2019-01-01

## 2019-11-09 PROBLEM — E61.1 IRON DEFICIENCY: Status: ACTIVE | Noted: 2019-01-01

## 2019-11-09 PROBLEM — R33.8 ACUTE URINARY RETENTION: Status: ACTIVE | Noted: 2019-01-01

## 2019-11-09 PROBLEM — K59.01 SLOW TRANSIT CONSTIPATION: Status: ACTIVE | Noted: 2019-01-01

## 2019-11-09 PROBLEM — N17.9 ACUTE RENAL FAILURE SUPERIMPOSED ON STAGE 2 CHRONIC KIDNEY DISEASE (HCC): Status: ACTIVE | Noted: 2019-01-01

## 2019-11-09 PROBLEM — K59.00 CONSTIPATION: Status: ACTIVE | Noted: 2019-01-01

## 2019-11-09 PROBLEM — N18.2 ACUTE RENAL FAILURE SUPERIMPOSED ON STAGE 2 CHRONIC KIDNEY DISEASE (HCC): Status: ACTIVE | Noted: 2019-01-01

## 2019-11-09 NOTE — ED PROVIDER NOTES
"EMERGENCY DEPARTMENT ENCOUNTER    Room Number:  18/18  Date seen:  11/9/2019  Time seen: 12:04 AM  PCP: Elicia Tobar MD    HPI:  Chief complaint: Generalized Weakness  Context:Maru Richey is a 89 y.o. female who presents to the ED with c/o constipation x > 1 week. Daughter reported to EMS recent generalized weakness that began 1 week ago. She also reports having some nausea and lower abd pain.      MEDICAL RECORD REVIEW    Started on Keflex by PCP on 11/1/19 for urinary frequency. Family then called PCP yesterday for \"I returned patient's daughter's phone call. Lisa says that the patient fell late Monday night/early Tuesday morning. Lisa says that Tuesday the pt finished her round of antibiotics and since then has \"not been acting herself\". She says the patient has been, \"drooling and spitting excessively\" and is asking for Dr. Tobar's opinion.  I will forward message to Dr. Tobar.\"      ALLERGIES  Patient has no known allergies.    PAST MEDICAL HISTORY  Active Ambulatory Problems     Diagnosis Date Noted   • Syncope 08/07/2019   • Hypertension 08/07/2019   • Hyperlipidemia 08/07/2019   • History of peptic ulcer 08/07/2019   • Weight loss, abnormal 08/07/2019   • Osteoarthritis of multiple joints 09/25/2019   • Compression fracture of T10 vertebra (CMS/HCC) 10/07/2019   • Dehydration 10/08/2019   • Knee pain 10/08/2019     Resolved Ambulatory Problems     Diagnosis Date Noted   • No Resolved Ambulatory Problems     Past Medical History:   Diagnosis Date   • Hyperlipidemia    • Hypertension    • Iron deficiency        PAST SURGICAL HISTORY  Past Surgical History:   Procedure Laterality Date   • APPENDECTOMY     • HYSTERECTOMY         FAMILY HISTORY  No family history on file.    SOCIAL HISTORY  Social History     Socioeconomic History   • Marital status: Single     Spouse name: Not on file   • Number of children: Not on file   • Years of education: Not on file   • Highest education level: Not on file "   Tobacco Use   • Smoking status: Never Smoker   • Smokeless tobacco: Never Used   Substance and Sexual Activity   • Alcohol use: No     Frequency: Never   • Drug use: No       REVIEW OF SYSTEMS  Review of Systems   Constitutional: Negative for chills and fever.   HENT: Negative.    Eyes: Negative.    Respiratory: Negative for shortness of breath.    Cardiovascular: Negative for chest pain.   Gastrointestinal: Positive for abdominal pain, constipation and nausea.   Genitourinary: Negative.    Musculoskeletal: Negative.    Skin: Negative.    Neurological: Positive for weakness (generalized).   Psychiatric/Behavioral: Negative.        PHYSICAL EXAM  ED Triage Vitals [11/09/19 0001]   Temp Heart Rate Resp BP SpO2   99 °F (37.2 °C) 70 16 170/96 92 %      Temp src Heart Rate Source Patient Position BP Location FiO2 (%)   -- -- -- -- --     Physical Exam   Constitutional: She is oriented to person, place, and time and well-developed, well-nourished, and in no distress.   HENT:   Head: Normocephalic and atraumatic.   Right Ear: External ear normal.   Left Ear: External ear normal.   Nose: Nose normal.   Eyes: Conjunctivae are normal.   Neck: Normal range of motion.   Cardiovascular: Normal rate and regular rhythm.   Pulmonary/Chest: Effort normal and breath sounds normal.   Abdominal: Soft. Bowel sounds are normal. She exhibits distension (mild lower). There is tenderness in the suprapubic area.   Musculoskeletal: Normal range of motion. She exhibits no edema.   Neurological: She is alert and oriented to person, place, and time.   BLANK SOMMERS, answers questions appropriately    Skin: Skin is warm and dry.   Psychiatric: Affect normal.   Nursing note and vitals reviewed.      LAB RESULTS  Recent Results (from the past 24 hour(s))   CBC Auto Differential    Collection Time: 11/09/19  1:03 AM   Result Value Ref Range    WBC 10.73 3.40 - 10.80 10*3/mm3    RBC 4.50 3.77 - 5.28 10*6/mm3    Hemoglobin 12.7 12.0 - 15.9 g/dL     Hematocrit 40.3 34.0 - 46.6 %    MCV 89.6 79.0 - 97.0 fL    MCH 28.2 26.6 - 33.0 pg    MCHC 31.5 31.5 - 35.7 g/dL    RDW 13.3 12.3 - 15.4 %    RDW-SD 43.9 37.0 - 54.0 fl    MPV 10.6 6.0 - 12.0 fL    Platelets 156 140 - 450 10*3/mm3    Neutrophil % 76.8 (H) 42.7 - 76.0 %    Lymphocyte % 6.8 (L) 19.6 - 45.3 %    Monocyte % 15.7 (H) 5.0 - 12.0 %    Eosinophil % 0.0 (L) 0.3 - 6.2 %    Basophil % 0.1 0.0 - 1.5 %    Immature Grans % 0.6 (H) 0.0 - 0.5 %    Neutrophils, Absolute 8.25 (H) 1.70 - 7.00 10*3/mm3    Lymphocytes, Absolute 0.73 0.70 - 3.10 10*3/mm3    Monocytes, Absolute 1.68 (H) 0.10 - 0.90 10*3/mm3    Eosinophils, Absolute 0.00 0.00 - 0.40 10*3/mm3    Basophils, Absolute 0.01 0.00 - 0.20 10*3/mm3    Immature Grans, Absolute 0.06 (H) 0.00 - 0.05 10*3/mm3    nRBC 0.1 0.0 - 0.2 /100 WBC   Urinalysis With Microscopic If Indicated (No Culture) - Urine, Catheter    Collection Time: 11/09/19  1:20 AM   Result Value Ref Range    Color, UA Yellow Yellow, Straw    Appearance, UA Clear Clear    pH, UA 6.5 5.0 - 8.0    Specific Gravity, UA 1.016 1.005 - 1.030    Glucose, UA Negative Negative    Ketones, UA Negative Negative    Bilirubin, UA Negative Negative    Blood, UA Moderate (2+) (A) Negative    Protein, UA Trace (A) Negative    Leuk Esterase, UA Moderate (2+) (A) Negative    Nitrite, UA Negative Negative    Urobilinogen, UA 0.2 E.U./dL 0.2 - 1.0 E.U./dL   Urinalysis, Microscopic Only - Urine, Catheter    Collection Time: 11/09/19  1:20 AM   Result Value Ref Range    RBC, UA 13-20 (A) None Seen, 0-2 /HPF    WBC, UA 13-20 (A) None Seen, 0-2 /HPF    Bacteria, UA None Seen None Seen /HPF    Squamous Epithelial Cells, UA 13-20 (A) None Seen, 0-2 /HPF    Hyaline Casts, UA 3-6 None Seen /LPF    Methodology Manual Light Microscopy    Lipase    Collection Time: 11/09/19  2:10 AM   Result Value Ref Range    Lipase 22 13 - 60 U/L   Comprehensive Metabolic Panel    Collection Time: 11/09/19  5:21 AM   Result Value Ref Range     Glucose 130 (H) 65 - 99 mg/dL    BUN 48 (H) 8 - 23 mg/dL    Creatinine 2.71 (H) 0.57 - 1.00 mg/dL    Sodium 138 136 - 145 mmol/L    Potassium 4.8 3.5 - 5.2 mmol/L    Chloride 96 (L) 98 - 107 mmol/L    CO2 30.4 (H) 22.0 - 29.0 mmol/L    Calcium 11.0 (H) 8.2 - 9.6 mg/dL    Total Protein 7.1 6.0 - 8.5 g/dL    Albumin 3.50 3.50 - 5.20 g/dL    ALT (SGPT) 9 1 - 33 U/L    AST (SGOT) 14 1 - 32 U/L    Alkaline Phosphatase 68 39 - 117 U/L    Total Bilirubin 0.4 0.2 - 1.2 mg/dL    eGFR  African Amer 20 (L) >60 mL/min/1.73    Globulin 3.6 gm/dL    A/G Ratio 1.0 g/dL    BUN/Creatinine Ratio 17.7 7.0 - 25.0    Anion Gap 11.6 5.0 - 15.0 mmol/L   Troponin    Collection Time: 11/09/19  5:21 AM   Result Value Ref Range    Troponin T <0.010 0.000 - 0.030 ng/mL       I ordered the above labs and reviewed the results    RADIOLOGY  CT Abdomen Pelvis Without Contrast   Final Result       1. The patient has a massive volume of fecal material throughout the   colon, characteristic of severe constipation/obstipation. There is no   evidence of mechanical small bowel obstruction.   2. The patient has bilateral hydronephrosis. This is favored to be   secondary to urinary retention, as there is distention of the urinary   bladder. Patient may benefit from catheterization.   3. Distal esophagus appears patulous and fluid-filled. Correlation with   any evidence of esophagitis is suggested.       Radiation dose reduction techniques were utilized, including automated   exposure control and exposure modulation based on body size.       This report was finalized on 11/9/2019 1:15 AM by Dr. Kellie Dwyer M.D.              I ordered the above noted radiological studies and reviewed the images on the PACS system.    MEDICATIONS GIVEN IN ER  Medications - No data to display    EKG  Interpreted by ED Physician    PROCEDURES  Procedures      PROGRESS AND CONSULTS    Progress Notes:    ED Course as of Nov 09 2349   Sat Nov 09, 2019   0626 Discussed  patient with Dr. Gongora who agrees to admit for further evaluation and treatment.  [KA]      ED Course User Index  [KA] Yolanda Cavazos PA     12:44 AM  Blood work, UA, CT abd/pel ordered.     1:19 AM   Reviewed pt's history and workup with Dr. Carlos.  After a bedside evaluation; Dr Carlos agrees with the plan of care        DIAGNOSIS  Final diagnoses:   Constipation, unspecified constipation type   Generalized weakness   VENU (acute kidney injury) (CMS/[facility])   Urinary retention       Documentation assistance provided by hasmukh Flores for Yolanda Cavazos PA-C.  Information recorded by the scribe was done at my direction and has been verified and validated by me.         Chivo lFores  11/09/19 6520       Yolanda Cavazos PA  11/09/19 [id number]

## 2019-11-09 NOTE — ED PROVIDER NOTES
MD ATTESTATION NOTE    Patient is a 89 y.o. female who presents to the ED with complaint of constipation and diffuse abdominal pain. Pt reports her last bowel movement was over a week ago.     Limited Physical Exam:  Awake and orientated x3, generalized weakness. CARDIO exam show RRR, no murmurs. RESP exam show no respiratory distress. ABD exam show abd is soft, non-tender, non-distended (despite CT scan show significant distention) normal bowel sounds. NEURO exam show non-focal.     Radiology:   CT abd/pelvis done on 11/09/19 show distended colon with large fecal material and urinary bladder distention and urinary retention.     Progress Note:    0127: Workup reviewed. Discussed results of CT abd/pelvis imaging study. Discussed with her the plan to admit the patient to further evaluation and management. Pt understands and agrees with the plan, all questions answered.      The BLESSING and I have discussed this patient's history, physical exam, and treatment plan.  I have reviewed the documentation and personally had a face to face interaction with the patient. I affirm the documentation and agree with the treatment and plan.  The attached note describes my personal findings.    Documentation assistance provided by hasmukh Lacey and Isabel Ray for Dr. Danielito Carlos MD.  Information recorded by the scribe was done at my direction and has been verified and validated by me.         Venus Lacey  11/09/19 0204       Isabel Ray  11/09/19 0254       Danielito Carlos MD  11/09/19 [id number]

## 2019-11-09 NOTE — ED NOTES
Pt resting quietly no s/s of distress noted, resp rate even unlabored equal rise and fall of chest,     Yamilka Gomez, RN  11/09/19 0784

## 2019-11-09 NOTE — ED NOTES
Due to CT scan showing large bladder, told by SILVESTRE Bear to put in urinary catheter regardless of protocol.      Neva Hernandez, RN  11/09/19 0147

## 2019-11-09 NOTE — H&P
"    Patient Name:  Maru Richey  YOB: 1925  MRN:  [id number]  Admit Date:  11/9/2019  Patient Care Team:  Elicia Tobar MD as PCP - General (Family Medicine)      Subjective   History Present Illness     Chief Complaint   Patient presents with   • Weakness - Generalized     for a week   • Dysuria     History of Present Illness   Ms. Richey is a 89 y.o. non-smoker with a history of osteoarthritis, iron deficiency, peptic ulcer and prior compression fracture that presents to [facility] complaining of weakness. The patient is alone during this visit so history is somewhat limited. The patient states she came in related to weakness. She states she fell about 1 week ago when she lost her balance with her walker at home and fell to the ground. She denies hitting her head and denies any pain at this time. She states she lives with her daughter, but her daughter had gone to the grocery at the time of the fall. She told the patient not to get up while she was gone, but the patient states she had to use the bathroom so she got up. She states she hasn't had a bowel movement in about a week and does report some decreased oral intake. She states she has been having some generalized abdominal pain and nausea, but no vomiting. She also reports some dysuria since \"they put me on iron.\" She states she takes laxatives at home, but cannot tell me what she takes. She denies any recent fever, chills, chest pain or dyspnea. She denies any prior history of urinary retention or decreased urination at home. In the ED, lab work revealed a creatinine of 2.71 (0.90 on 10/8/19), sodium 138, potassium 4.8 and no leukocytosis. She had a CT of her abdomen and pelvis that showed a massive volume of fecal material throughout colon, but no mechanical bowel obstruction. Additionally, bilateral hydronephrosis was found thought secondary to urinary retention as her bladder was distended. Distal esophagus appeared " patulous and fluid-filled suggesting esophagitis. She is being admitted for further treatment and evaluation.     Review of Systems   Constitutional: Negative for activity change, appetite change, diaphoresis and fatigue.   HENT: Negative for congestion, ear pain and rhinorrhea.    Eyes: Negative for pain and discharge.   Respiratory: Negative for cough, choking and chest tightness.    Cardiovascular: Negative for chest pain and leg swelling.   Gastrointestinal: Positive for abdominal pain, constipation and nausea. Negative for abdominal distention, diarrhea and vomiting.   Endocrine: Negative for cold intolerance and heat intolerance.   Genitourinary: Negative for difficulty urinating, dysuria and flank pain.   Musculoskeletal: Positive for gait problem (baseline). Negative for back pain.   Skin: Negative for color change and pallor.   Neurological: Positive for weakness. Negative for dizziness, light-headedness and headaches.   Psychiatric/Behavioral: Negative for confusion. The patient is not nervous/anxious.       Personal History     Past Medical History:   Diagnosis Date   • Hyperlipidemia    • Hypertension    • Iron deficiency      Past Surgical History:   Procedure Laterality Date   • APPENDECTOMY     • HYSTERECTOMY     Fhx- htn  History reviewed. No pertinent family history.  Social History     Tobacco Use   • Smoking status: Never Smoker   • Smokeless tobacco: Never Used   Substance Use Topics   • Alcohol use: No     Frequency: Never   • Drug use: No     Medications Prior to Admission   Medication Sig Dispense Refill Last Dose   • acetaminophen (TYLENOL) 500 MG tablet Take 2 tablets by mouth Every 8 (Eight) Hours As Needed for Mild Pain  or Moderate Pain . 60 tablet 0    • ALPHAGAN P 0.1 % solution ophthalmic solution    10/7/2019 at Unknown time   • Elastic Bandages & Supports (MEDICAL COMPRESSION SOCKS) misc Compression socks due to edema in legs and ankles 2 each 1 10/7/2019 at Unknown time   • ferrous  gluconate (FERGON) 240 (27 FE) MG tablet Take 240 mg by mouth 2 (Two) Times a Day.   10/7/2019 at Unknown time   • fluticasone (FLONASE) 50 MCG/ACT nasal spray 1 spray into the nostril(s) as directed by provider Daily As Needed for Rhinitis or Allergies.   Past Week at Unknown time     Allergies:  No Known Allergies    Objective    Objective     Vital Signs  Temp:  [98.3 °F (36.8 °C)-99 °F (37.2 °C)] 98.3 °F (36.8 °C)  Heart Rate:  [] 104  Resp:  [16-18] 16  BP: (106-176)/() 118/71  SpO2:  [92 %-99 %] 94 %  on   ;   Device (Oxygen Therapy): room air  Body mass index is 17.97 kg/m².    Physical Exam   Constitutional: She is oriented to person, place, and time. No distress.   HENT:   Head: Normocephalic and atraumatic.   Eyes: Conjunctivae are normal. Right eye exhibits no discharge. Left eye exhibits no discharge.   Neck: Normal range of motion. Neck supple.   Cardiovascular: Normal rate, regular rhythm, normal heart sounds and intact distal pulses.   No murmur heard.  Pulmonary/Chest: Effort normal. No respiratory distress. She has decreased breath sounds.   Abdominal: Bowel sounds are normal. She exhibits distension (mild). There is no tenderness.   Mild firmness noted to generalized abdomen.   Genitourinary:   Genitourinary Comments: F/C in place. Loose stool present.   Musculoskeletal: Normal range of motion. She exhibits no edema.   Neurological: She is alert and oriented to person, place, and time. She exhibits normal muscle tone. Coordination normal.   Slow motor and verbal responses. Mild upper extremity tremor noted bilaterally.   Skin: Skin is warm and dry. She is not diaphoretic. No erythema.   Psychiatric: She has a normal mood and affect. Her behavior is normal.   Nursing note and vitals reviewed.     Results Review:  I reviewed the patient's new clinical results.  I reviewed the patient's new imaging results and agree with the interpretation.  I reviewed the patient's other test results and  agree with the interpretation  I personally viewed and interpreted the patient's EKG/Telemetry data    Lab Results (last 24 hours)     Procedure Component Value Units Date/Time    CBC & Differential [[id number]] Collected:  11/09/19 0103    Specimen:  Blood Updated:  11/09/19 0153    Narrative:       The following orders were created for panel order CBC & Differential.  Procedure                               Abnormality         Status                     ---------                               -----------         ------                     CBC Auto Differential[[id number]]        Abnormal            Final result                 Please view results for these tests on the individual orders.    CBC Auto Differential [[id number]]  (Abnormal) Collected:  11/09/19 0103    Specimen:  Blood Updated:  11/09/19 0153     WBC 10.73 10*3/mm3      RBC 4.50 10*6/mm3      Hemoglobin 12.7 g/dL      Hematocrit 40.3 %      MCV 89.6 fL      MCH 28.2 pg      MCHC 31.5 g/dL      RDW 13.3 %      RDW-SD 43.9 fl      MPV 10.6 fL      Platelets 156 10*3/mm3      Neutrophil % 76.8 %      Lymphocyte % 6.8 %      Monocyte % 15.7 %      Eosinophil % 0.0 %      Basophil % 0.1 %      Immature Grans % 0.6 %      Neutrophils, Absolute 8.25 10*3/mm3      Lymphocytes, Absolute 0.73 10*3/mm3      Monocytes, Absolute 1.68 10*3/mm3      Eosinophils, Absolute 0.00 10*3/mm3      Basophils, Absolute 0.01 10*3/mm3      Immature Grans, Absolute 0.06 10*3/mm3      nRBC 0.1 /100 WBC     Urinalysis With Microscopic If Indicated (No Culture) - Urine, Catheter [[id number]]  (Abnormal) Collected:  11/09/19 0120    Specimen:  Urine, Catheter Updated:  11/09/19 0257     Color, UA Yellow     Appearance, UA Clear     pH, UA 6.5     Specific Gravity, UA 1.016     Glucose, UA Negative     Ketones, UA Negative     Bilirubin, UA Negative     Blood, UA Moderate (2+)     Protein, UA Trace     Leuk Esterase, UA Moderate (2+)     Nitrite, UA Negative     Urobilinogen, UA 0.2  E.U./dL    Urinalysis, Microscopic Only - Urine, Catheter [[id number]]  (Abnormal) Collected:  11/09/19 0120    Specimen:  Urine, Catheter Updated:  11/09/19 0257     RBC, UA 13-20 /HPF      WBC, UA 13-20 /HPF      Bacteria, UA None Seen /HPF      Squamous Epithelial Cells, UA 13-20 /HPF      Hyaline Casts, UA 3-6 /LPF      Methodology Manual Light Microscopy    Lipase [[id number]]  (Normal) Collected:  11/09/19 0210    Specimen:  Blood Updated:  11/09/19 0324     Lipase 22 U/L     Comprehensive Metabolic Panel [[id number]]  (Abnormal) Collected:  11/09/19 0521    Specimen:  Blood from Arm, Left Updated:  11/09/19 0553     Glucose 130 mg/dL      BUN 48 mg/dL      Creatinine 2.71 mg/dL      Sodium 138 mmol/L      Potassium 4.8 mmol/L      Chloride 96 mmol/L      CO2 30.4 mmol/L      Calcium 11.0 mg/dL      Total Protein 7.1 g/dL      Albumin 3.50 g/dL      ALT (SGPT) 9 U/L      AST (SGOT) 14 U/L      Alkaline Phosphatase 68 U/L      Total Bilirubin 0.4 mg/dL      eGFR  African Amer 20 mL/min/1.73      Globulin 3.6 gm/dL      A/G Ratio 1.0 g/dL      BUN/Creatinine Ratio 17.7     Anion Gap 11.6 mmol/L     Narrative:       GFR Normal >60  Chronic Kidney Disease <60  Kidney Failure <15    Troponin [[id number]]  (Normal) Collected:  11/09/19 0521    Specimen:  Blood from Arm, Left Updated:  11/09/19 0555     Troponin T <0.010 ng/mL     Narrative:       Troponin T Reference Range:  <= 0.03 ng/mL-   Negative for AMI  >0.03 ng/mL-     Abnormal for myocardial necrosis.  Clinicians would have to utilize clinical acumen, EKG, Troponin and serial changes to determine if it is an Acute Myocardial Infarction or myocardial injury due to an underlying chronic condition.           Imaging Results (Last 24 Hours)     Procedure Component Value Units Date/Time    CT Abdomen Pelvis Without Contrast [[id number]] Collected:  11/09/19 0105     Updated:  11/09/19 0118    Narrative:       CT OF THE ABDOMEN AND PELVIS WITHOUT CONTRAST     HISTORY:  Lower abdominal pain and constipation     COMPARISON: None available.     TECHNIQUE: Axial CT imaging was obtained from the dome the diaphragm to  the symphysis pubis. No IV contrast was administered.     FINDINGS:  Images through the lung bases demonstrate scarring and atelectasis.  There are coronary artery calcifications. Distal esophagus appears  somewhat patulous and fluid-filled. Correlation with any symptoms of  esophagitis is recommended. The patient has a large duodenal  diverticulum. The adrenal glands are bulky in appearance. Spleen is  unremarkable. The pancreas is mildly atrophic. The gallbladder appears  unremarkable. There is atherosclerotic involvement of the abdominal  aorta. The patient has dilatation of the renal collecting systems  bilaterally. This favored to be secondary to urinary retention, as there  is marked distention of the urinary bladder. The urinary bladder  measures up to 12.3 cm in craniocaudal dimensions. There are 2 apparent  cystic structure seen within the pelvis, which I think may reflect  adnexal cysts. The larger is seen on the right, and measures up to 2.6 x  2.1 cm. Area on the left measures up to about 2.3 x 1.3 cm. These would  be better assessed with dedicated pelvic ultrasound, as deemed  clinically appropriate in this 89-year-old patient. The patient has a  massive volume of fecal material seen throughout the colon. There is  colonic diverticulosis. There is no evidence of diverticulitis. The  cecum is the most dilated segment. This measures up to 9.7 cm. No  pneumatosis or free air is seen. Again, there is no evidence of  mechanical small bowel obstruction. The patient is osteoporotic. No  acute osseous abnormalities are seen.       Impression:          1. The patient has a massive volume of fecal material throughout the  colon, characteristic of severe constipation/obstipation. There is no  evidence of mechanical small bowel obstruction.  2. The patient has bilateral  hydronephrosis. This is favored to be  secondary to urinary retention, as there is distention of the urinary  bladder. Patient may benefit from catheterization.  3. Distal esophagus appears patulous and fluid-filled. Correlation with  any evidence of esophagitis is suggested.     Radiation dose reduction techniques were utilized, including automated  exposure control and exposure modulation based on body size.     This report was finalized on 11/9/2019 1:15 AM by Dr. Kellie Dwyer M.D.             Results for orders placed during the hospital encounter of 08/07/19   Adult Transthoracic Echo Complete W/ Cont if Necessary Per Protocol (With Agitated Saline)    Narrative · Left ventricular systolic function is normal. Calculated EF = 61.0%.   Estimated EF was in agreement with the calculated EF. Normal left   ventricular cavity size noted. All left ventricular wall segments contract   normally.  · There is mild concentric LVH, with moderate asymmetric septal   hypertrophy; there is no evidence of obstruction. Left ventricular   diastolic dysfunction is noted (grade I a w/high LAP) consistent with   impaired relaxation.  · Left atrial cavity size is mildly dilated. Saline test results are   negative.  · There is moderate calcification of the aortic valve.No significant   aortic valve regurgitation is present. No hemodynamically significant   aortic valve stenosis is present.          ECG 12 Lead   Preliminary Result   HEART RATE= 101  bpm   RR Interval= 592  ms   AL Interval= 130  ms   P Horizontal Axis= -18  deg   P Front Axis= 91  deg   QRSD Interval= 79  ms   QT Interval= 322  ms   QRS Axis= -32  deg   T Wave Axis= 89  deg   - ABNORMAL ECG -   Sinus tachycardia   Probable left atrial enlargement   Left axis deviation   Nonspecific T abnormalities, lateral leads   Electronically Signed By:    Date and Time of Study: 2019-11-09 01:04:55           Assessment/Plan     Active Hospital Problems    Diagnosis POA   •  **Acute renal failure superimposed on stage 2 chronic kidney disease (CMS/HCC) [N17.9, N18.2] Yes   • Slow transit constipation [K59.01] Yes   • Iron deficiency [E61.1] Yes   • Acute urinary retention [R33.8] Yes   • Osteoarthritis of multiple joints [M15.9] Yes   • History of peptic ulcer [Z87.11] Not Applicable   • Hyperlipidemia [E78.5] Yes     Plan:  -Admit to telemetry.  -Start bowel regimen for constipation noted on CT. (Patient already has had bowel movement at this time.)  -IVF with NS at 100.   -Continue khan catheter as placed in ED for urinary retention. Suspect retention likely due to constipation. Will hold on Urology consultation for now. Possible voiding trial tomorrow assuming bowels continue to move.  -VENU likely due to urinary retention. Will recheck labs in AM. If no improvement, consider renal consultation.  -Esophagitis noted on CT. Patient poor historian in relation to symptoms. Will add Pepcid BID (no PPI related to kidney function).      I discussed the patients findings and my recommendations with patient, family and nursing staff.    VTE Prophylaxis - SCDs.  Code Status - Full code. Confirmed with patient. Please, notify if family wishes other as patient is somewhat poor historian.       LAURIE Hoffman  [facility]  11/09/19  1:20 PM    I supervised care provided by the LAURIE. We have discussed the case. I have reviewed  the note and agree with the plan of treatment. I personally interviewed the patient and examined the patient. My exam shows a pleasant 89 yo. She is elderly. Poor memory. +catheter. Soft, nt. Plan as above.     Jatin Ewing MD  [facility]  11/09/19  3:57 PM

## 2019-11-09 NOTE — ED NOTES
Pt in room no needs voiced at this time call light within reach cardiac monitor in place. Will continue monitor      Yamilka Gomez, RN  11/09/19 1491

## 2019-11-09 NOTE — ED NOTES
No change in patient status continues to rest with eyes closed wakes briefly with name calling and goes back to sleep quickly. Vitals stable cardiac monitor  In place.     Yamilka Gomez RN  11/09/19 4074

## 2019-11-09 NOTE — ED NOTES
Pt continues to rest quietly no outward s/s of distress noted. Vitals stable cardiac monitor in place. Equal rise and fall of chest resp rate even/unlabored     Yamilka Gomez RN  11/09/19 5948

## 2019-11-09 NOTE — ED NOTES
"Pt here via EMS with complains of weakness, painful urination, and questionable growth in rectal area with constipation. Pt states\" I hadn't ha a bowel movement in a few days, and it just hurts\"     Yamilka Gomez RN  11/09/19 0042    "

## 2019-11-09 NOTE — PLAN OF CARE
Problem: Skin Injury Risk (Adult)  Goal: Identify Related Risk Factors and Signs and Symptoms  Outcome: Ongoing (interventions implemented as appropriate)    Goal: Skin Health and Integrity  Outcome: Ongoing (interventions implemented as appropriate)      Problem: Fall Risk (Adult)  Goal: Identify Related Risk Factors and Signs and Symptoms  Outcome: Ongoing (interventions implemented as appropriate)    Goal: Absence of Fall  Outcome: Ongoing (interventions implemented as appropriate)      Problem: Patient Care Overview  Goal: Plan of Care Review  Outcome: Ongoing (interventions implemented as appropriate)   11/09/19 9597   Coping/Psychosocial   Plan of Care Reviewed With patient   Plan of Care Review   Progress no change   OTHER   Outcome Summary ER pt c/o constipation, had BM today, AAOX3 hesitancy with speech, NAD, vs stable, PU stage 2, ST monitor     Goal: Individualization and Mutuality  Outcome: Ongoing (interventions implemented as appropriate)    Goal: Discharge Needs Assessment  Outcome: Ongoing (interventions implemented as appropriate)    Goal: Interprofessional Rounds/Family Conf  Outcome: Ongoing (interventions implemented as appropriate)      Problem: Constipation (Adult)  Goal: Identify Related Risk Factors and Signs and Symptoms  Outcome: Ongoing (interventions implemented as appropriate)    Goal: Effective Bowel Elimination  Outcome: Ongoing (interventions implemented as appropriate)    Goal: Comfort  Outcome: Ongoing (interventions implemented as appropriate)

## 2019-11-09 NOTE — ED TRIAGE NOTES
"Pt arrived from home via [facility] with complaints of increasing generalized weakness for the past week. Pt was recently diagnosed with UTI and finished antibiotics Tuesday. Pt complains of dysuria.  Pt lives with her daughter and she reports she noticed \"a growth in her rectal area\".  Pt denies any other pain. Pt was on blood pressure medications due to hypotension but was taken off of them. BP is elevated tonight.  "

## 2019-11-09 NOTE — ED NOTES
Resting quietly.  Appears to be sleeping.  No acute distress noted.     Leora Vargas RN  11/09/19 4858

## 2019-11-10 PROBLEM — R07.89 CHEST PAIN, ATYPICAL: Status: ACTIVE | Noted: 2019-01-01

## 2019-11-10 NOTE — PLAN OF CARE
Problem: Skin Injury Risk (Adult)  Goal: Identify Related Risk Factors and Signs and Symptoms  Outcome: Ongoing (interventions implemented as appropriate)    Goal: Skin Health and Integrity  Outcome: Ongoing (interventions implemented as appropriate)      Problem: Fall Risk (Adult)  Goal: Identify Related Risk Factors and Signs and Symptoms  Outcome: Ongoing (interventions implemented as appropriate)    Goal: Absence of Fall  Outcome: Ongoing (interventions implemented as appropriate)      Problem: Patient Care Overview  Goal: Plan of Care Review  Outcome: Ongoing (interventions implemented as appropriate)   11/10/19 1637   Coping/Psychosocial   Plan of Care Reviewed With patient   Plan of Care Review   Progress no change   OTHER   Outcome Summary no c/o pain, dressing changed on coccyx, AAOX3, hesitant with speaking, IVF, khan cath care     Goal: Individualization and Mutuality  Outcome: Ongoing (interventions implemented as appropriate)    Goal: Discharge Needs Assessment  Outcome: Ongoing (interventions implemented as appropriate)    Goal: Interprofessional Rounds/Family Conf  Outcome: Ongoing (interventions implemented as appropriate)      Problem: Constipation (Adult)  Goal: Identify Related Risk Factors and Signs and Symptoms  Outcome: Ongoing (interventions implemented as appropriate)    Goal: Effective Bowel Elimination  Outcome: Ongoing (interventions implemented as appropriate)    Goal: Comfort  Outcome: Ongoing (interventions implemented as appropriate)

## 2019-11-10 NOTE — PROGRESS NOTES
Name: Maru Richey ADMIT: 2019   : 1925  PCP: Elicia Tobar MD    MRN: [id number] LOS: 1 days   AGE/SEX: 89 y.o. female  ROOM: [facility]   Subjective   Chief Complaint   Patient presents with   • Weakness - Generalized     for a week   • Dysuria   weakness is fair  On going for a week  On stool softer  Has khan    ROS  No f/c  No n/v  No cp/palp  No soa/cough    Objective   Vital Signs  Temp:  [97.8 °F (36.6 °C)-98.6 °F (37 °C)] 97.8 °F (36.6 °C)  Heart Rate:  [] 87  Resp:  [16-18] 18  BP: ()/(58-71) 116/65  SpO2:  [92 %-100 %] 100 %  on   ;   Device (Oxygen Therapy): room air  Body mass index is 17.97 kg/m².    Physical Exam   HENT:   Head: Normocephalic and atraumatic.   Eyes: Conjunctivae are normal. No scleral icterus.   Neck: Neck supple. No tracheal deviation present.   Cardiovascular: Normal rate and regular rhythm.   No murmur heard.  Pulmonary/Chest: Effort normal and breath sounds normal.   Abdominal: Soft. There is no tenderness. There is no guarding.   Genitourinary:   Genitourinary Comments: +catheter   Neurological: She is alert. She exhibits normal muscle tone.   Skin: Skin is warm and dry.   elderly, poor memory    Results Review:       I reviewed the patient's new clinical results.  Results from last 7 days   Lab Units 11/10/19  0707 19  0103   WBC 10*3/mm3 8.48 10.73   HEMOGLOBIN g/dL 10.3* 12.7   PLATELETS 10*3/mm3 117* 156     Results from last 7 days   Lab Units 11/10/19  0707 19  0521   SODIUM mmol/L 140 138   POTASSIUM mmol/L 4.5 4.8   CHLORIDE mmol/L 103 96*   CO2 mmol/L 27.0 30.4*   BUN mg/dL 37* 48*   CREATININE mg/dL 1.23* 2.71*   GLUCOSE mg/dL 92 130*   Estimated Creatinine Clearance: 23.7 mL/min (A) (by C-G formula based on SCr of 1.23 mg/dL (H)).  Results from last 7 days   Lab Units 19  0521   ALBUMIN g/dL 3.50   BILIRUBIN mg/dL 0.4   ALK PHOS U/L 68   AST (SGOT) U/L 14   ALT (SGPT) U/L 9     Results from last 7 days   Lab Units  11/10/19  0707 11/09/19  0521   CALCIUM mg/dL 9.7* 11.0*   ALBUMIN g/dL  --  3.50         Coag     HbA1C   Lab Results   Component Value Date    HGBA1C 5.50 08/08/2019     Infection     Radiology(recent) Ct Abdomen Pelvis Without Contrast    Result Date: 11/9/2019   1. The patient has a massive volume of fecal material throughout the colon, characteristic of severe constipation/obstipation. There is no evidence of mechanical small bowel obstruction. 2. The patient has bilateral hydronephrosis. This is favored to be secondary to urinary retention, as there is distention of the urinary bladder. Patient may benefit from catheterization. 3. Distal esophagus appears patulous and fluid-filled. Correlation with any evidence of esophagitis is suggested.  Radiation dose reduction techniques were utilized, including automated exposure control and exposure modulation based on body size.  This report was finalized on 11/9/2019 1:15 AM by Dr. Kellie Dwyer M.D.      Lab Results   Component Value Date    TROPONINT <0.010 11/09/2019     No components found for: TSH;2      bisacodyl 10 mg Rectal Daily   pantoprazole 40 mg Oral BID AC   polyethylene glycol 17 g Oral BID   senna-docusate sodium 2 tablet Oral BID   sodium chloride 10 mL Intravenous Q12H       sodium chloride 50 mL/hr Last Rate: 125 mL/hr (11/09/19 1753)   Diet Regular      Assessment/Plan      Active Hospital Problems    Diagnosis  POA   • **Acute renal failure superimposed on stage 2 chronic kidney disease (CMS/HCC) [N17.9, N18.2]  Yes   • Chest pain, atypical [R07.89]  Unknown   • Slow transit constipation [K59.01]  Yes   • Iron deficiency [E61.1]  Yes   • Acute urinary retention [R33.8]  Yes   • Acute kidney failure (CMS/HCC) [N17.9]  Yes   • Osteoarthritis of multiple joints [M15.9]  Yes   • History of peptic ulcer [Z87.11]  Not Applicable   • Hyperlipidemia [E78.5]  Yes      Resolved Hospital Problems   No resolved problems to display.     Acute Renal failure  likely was secondary to urinary retention as well as dehydration.  Suspect the retention related to the significant constipation.  Renal function is improved today.  Patient still has Velazquez    · Decrease IVFs, Monitor renal fx  · Agents for constipation, check KUB in AM to assess stool burden  · Maybe voiding trial tomorrow if constipation improved  · PT  · PPI for esophagitis, I think better not to pursue endoscopy at this point with her age as long as symptoms are not too severe  · Some atypical CP. May be from esophagitis. Check EKG and troponin.       DW staff  Reviewed previous records    Potential discharge in next 2 days likely.       Jatin Ewing MD  [facility]  11/10/19  12:42 PM

## 2019-11-10 NOTE — THERAPY EVALUATION
Patient Name: Maru Richey  : 1925    MRN: [id number]                              Today's Date: 11/10/2019       Admit Date: 2019    Visit Dx:     ICD-10-CM ICD-9-CM   1. Constipation, unspecified constipation type K59.00 564.00   2. Generalized weakness R53.1 780.79   3. VENU (acute kidney injury) (CMS/HCC) N17.9 584.9   4. Urinary retention R33.9 788.20     Patient Active Problem List   Diagnosis   • Syncope   • Hypertension   • Hyperlipidemia   • History of peptic ulcer   • Weight loss, abnormal   • Osteoarthritis of multiple joints   • Compression fracture of T10 vertebra (CMS/HCC)   • Dehydration   • Knee pain   • Slow transit constipation   • Iron deficiency   • Acute renal failure superimposed on stage 2 chronic kidney disease (CMS/HCC)   • Acute urinary retention   • Acute kidney failure (CMS/HCC)   • Chest pain, atypical     Past Medical History:   Diagnosis Date   • Hyperlipidemia    • Hypertension    • Iron deficiency      Past Surgical History:   Procedure Laterality Date   • APPENDECTOMY     • HYSTERECTOMY       General Information     Row Name 11/10/19 1525          PT Evaluation Time/Intention    Document Type  evaluation  -MW     Mode of Treatment  physical therapy  -MW     Row Name 11/10/19 1525          General Information    Patient Profile Reviewed?  yes  -MW     Prior Level of Function  min assist:;gait RW for gait  -MW     Existing Precautions/Restrictions  fall Velazquez cath, IV line  -MW     Barriers to Rehab  medically complex  -MW     Row Name 11/10/19 1525          Cognitive Assessment/Intervention- PT/OT    Orientation Status (Cognition)  oriented to;person;situation  -MW     Cognitive Assessment/Intervention Comment  Min confused.  -MW     Row Name 11/10/19 1525          Safety Issues, Functional Mobility    Safety Issues Affecting Function (Mobility)  ability to follow commands;awareness of need for assistance  -MW     Impairments Affecting Function (Mobility)   balance;endurance/activity tolerance;strength  -     Comment, Safety Issues/Impairments (Mobility)  Gait belt used for safety.  -       User Key  (r) = Recorded By, (t) = Taken By, (c) = Cosigned By    Initials Name Provider Type    Hayley Small PT Physical Therapist        Mobility     Row Name 11/10/19 1527          Bed Mobility Assessment/Treatment    Bed Mobility Assessment/Treatment  bed mobility (all) activities  -     [place] Level (Bed Mobility)  moderate assist (50% patient effort);maximum assist (25% patient effort);2 person assist  -     Assistive Device (Bed Mobility)  bed rails;draw sheet  -     Row Name 11/10/19 1527          Sit-Stand Transfer    Sit-Stand [place] (Transfers)  maximum assist (25% patient effort);2 person assist;verbal cues;nonverbal cues (demo/gesture)  -     Assistive Device (Sit-Stand Transfers)  walker, front-wheeled  -     Row Name 11/10/19 1527          Gait/Stairs Assessment/Training    Comment (Gait/Stairs)  Unable to ambulate today due difficulty with standing.  -       User Key  (r) = Recorded By, (t) = Taken By, (c) = Cosigned By    Initials Name Provider Type    Hayley Small PT Physical Therapist        Obj/Interventions     Row Name 11/10/19 1529          General ROM    GENERAL ROM COMMENTS  WFL (B)LE  -     Row Name 11/10/19 1529          MMT (Manual Muscle Testing)    General MMT Comments  RLE 2+/5, LLE 3-/5  -MW     Row Name 11/10/19 1529          Therapeutic Exercise    Lower Extremity (Therapeutic Exercise)  SAQ (short arc quad), bilateral  -MW     Lower Extremity Range of Motion (Therapeutic Exercise)  ankle dorsiflexion/plantar flexion, bilateral  -MW     Position (Therapeutic Exercise)  seated;supine  -MW     Sets/Reps (Therapeutic Exercise)  x 10 reps each with Min active assistance  -     Row Name 11/10/19 1529          Static Sitting Balance    Level of [place] (Unsupported Sitting, Static Balance)  contact guard  assist;minimal assist, 75% patient effort;2 person assist  -MW     Sitting Position (Unsupported Sitting, Static Balance)  sitting on edge of bed  -     Row Name 11/10/19 1529          Dynamic Sitting Balance    Level of [place], Reaches Outside Midline (Sitting, Dynamic Balance)  minimal assist, 75% patient effort;2 person assist  -MW     Sitting Position, Reaches Outside Midline (Sitting, Dynamic Balance)  sitting on edge of bed  -     Row Name 11/10/19 1529          Static Standing Balance    Level of [place] (Supported Standing, Static Balance)  maximal assist, 25 to 49% patient effort;2 person assist  -     Assistive Device Utilized (Supported Standing, Static Balance)  walker, rolling  -MW     Comment (Supported Standing, Static Balance)  Unable to stand fully upright due to leaning back  -     Row Name 11/10/19 1529          Dynamic Standing Balance    Level of [place], Reaches Outside Midline (Standing, Dynamic Balance)  unable to perform activity  -[facility] Name 11/10/19 1529          Sensory Assessment/Intervention    Sensory General Assessment  no sensation deficits identified  -       User Key  (r) = Recorded By, (t) = Taken By, (c) = Cosigned By    Initials Name Provider Type     Hayley Edmonds, PT Physical Therapist        Goals/Plan     Row Name 11/10/19 1537          Bed Mobility Goal 1 (PT)    Activity/Assistive Device (Bed Mobility Goal 1, PT)  bed mobility activities, all  -     [place] Level/Cues Needed (Bed Mobility Goal 1, PT)  moderate assist (50-74% patient effort);minimum assist (75% or more patient effort)  -     Time Frame (Bed Mobility Goal 1, PT)  3 days  -     Row Name 11/10/19 153          Transfer Goal 1 (PT)    Activity/Assistive Device (Transfer Goal 1, PT)  sit-to-stand/stand-to-sit;walker, rolling  -     [place] Level/Cues Needed (Transfer Goal 1, PT)  moderate assist (50-74% patient effort)  -     Row Name 11/10/19 5762           Gait Training Goal 1 (PT)    Activity/Assistive Device (Gait Training Goal 1, PT)  gait (walking locomotion);assistive device use  -MW     [place] Level (Gait Training Goal 1, PT)  moderate assist (50-74% patient effort);2 person assist  -MW     Distance (Gait Goal 1, PT)  25  -MW       User Key  (r) = Recorded By, (t) = Taken By, (c) = Cosigned By    Initials Name Provider Type    MW Hayley Edmonds, PT Physical Therapist        Clinical Impression     Row Name 11/10/19 1531          Pain Assessment    Additional Documentation  Pain Scale: Numbers Pre/Post-Treatment (Group)  -MW     Row Name 11/10/19 1531          Pain Scale: Numbers Pre/Post-Treatment    Pain Scale: Numbers, Pretreatment  0/10 - no pain  -MW     Pain Scale: Numbers, Post-Treatment  0/10 - no pain  -MW     Row Name 11/10/19 1531          Plan of Care Review    Plan of Care Reviewed With  patient  -MW     Row Name 11/10/19 1531          Physical Therapy Clinical Impression    Patient/Family Goals Statement (PT Clinical Impression)  Pt agreeable to PT tx.  -MW     Criteria for Skilled Interventions Met (PT Clinical Impression)  yes;treatment indicated  -MW     Rehab Potential (PT Clinical Summary)  good, to achieve stated therapy goals  -MW     Predicted Duration of Therapy (PT)  1 week  -MW     Row Name 11/10/19 1531          Vital Signs    Pretreatment Heart Rate (beats/min)  93  -MW     Posttreatment Heart Rate (beats/min)  94  -MW     O2 Delivery Pre Treatment  room air  -MW     O2 Delivery Intra Treatment  room air  -MW     O2 Delivery Post Treatment  room air  -MW     Pre Patient Position  Supine  -MW     Intra Patient Position  Standing  -MW     Post Patient Position  Supine  -MW     Row Name 11/10/19 1531          Positioning and Restraints    Pre-Treatment Position  in bed  -MW     Post Treatment Position  bed  -MW     In Bed  side lying right;encouraged to call for assist;exit alarm on;call light within reach All lines intact  -MW        User Key  (r) = Recorded By, (t) = Taken By, (c) = Cosigned By    Initials Name Provider Type    Hayley Small PT Physical Therapist        Outcome Measures     Row Name 11/10/19 1539          How much help from another person do you currently need...    Turning from your back to your side while in flat bed without using bedrails?  2  -MW     Moving from lying on back to sitting on the side of a flat bed without bedrails?  2  -MW     Moving to and from a bed to a chair (including a wheelchair)?  1  -MW     Standing up from a chair using your arms (e.g., wheelchair, bedside chair)?  2  -MW     Climbing 3-5 steps with a railing?  1  -MW     To walk in hospital room?  1  -MW     AM-PAC 6 Clicks Score (PT)  9  -MW     Row Name 11/10/19 1539          Functional Assessment    Outcome Measure Options  AM-PAC 6 Clicks Basic Mobility (PT)  -       User Key  (r) = Recorded By, (t) = Taken By, (c) = Cosigned By    Initials Name Provider Type    Hayley Small PT Physical Therapist        Physical Therapy Education     Title: PT OT SLP Therapies (Not Started)     Topic: Physical Therapy (In Progress)     Point: Mobility training (In Progress)     Learning Progress Summary           Patient Acceptance, E,D, NR by  at 11/10/2019  3:35 PM                   Point: Home exercise program (In Progress)     Learning Progress Summary           Patient Acceptance, E,D, NR by  at 11/10/2019  3:35 PM                   Point: Body mechanics (In Progress)     Learning Progress Summary           Patient Acceptance, E,D, NR by  at 11/10/2019  3:35 PM                   Point: Precautions (In Progress)     Learning Progress Summary           Patient Acceptance, E,D, NR by  at 11/10/2019  3:35 PM                               User Key     Initials Effective Dates Name Provider Type Discipline     09/17/19 -  Hayley Edmonds PT Physical Therapist PT              PT Recommendation and Plan  Planned Therapy Interventions (PT  Eval): balance training, bed mobility training, gait training, home exercise program, patient/family education, strengthening, transfer training  Outcome Summary/Treatment Plan (PT)  Anticipated Discharge Disposition (PT): skilled nursing facility  Plan of Care Reviewed With: patient  Outcome Summary: Pt is a 89 yo female admitted on 11/9/19 with ARF, constipation, weakness, and recent fall. Today required Mod/Max A x 2 with bed mobility. Attemtped STS x 2 trials at RW requiring Max A and unable to fully stand without leaning backwards. Unable to intiate gait due to inablity to stand safely. She presents with generalized weakness (B)LE, R weaker than L, and decreased functional mobility. She may benefit from skilled PT to address functional deficits and increase independence prior to discharge.      Time Calculation:   PT Charges     Row Name 11/10/19 1540             Time Calculation    Start Time  1459  -MW      Stop Time  1522  -MW      Time Calculation (min)  23 min  -MW      PT Received On  11/10/19  -MW      PT - Next Appointment  11/11/19  -[facility]      PT Goal Re-Cert Due Date  11/17/19  -MW         Time Calculation- PT    Total Timed Code Minutes- PT  20 minute(s)  -MW        User Key  (r) = Recorded By, (t) = Taken By, (c) = Cosigned By    Initials Name Provider Type    Hayley Small PT Physical Therapist        Therapy Charges for Today     Code Description Service Date Service Provider Modifiers Qty    74177047622 HC PT EVAL MOD COMPLEXITY 2 11/10/2019 Hayley Edmonds, PT GP 1    15690257918 HC PT THER PROC EA 15 MIN 11/10/2019 Hayley Edmonds, PT GP 1    51351294870 HC PT THER SUPP EA 15 MIN 11/10/2019 Hayley Edmonds, PT GP 1          PT G-Codes  Outcome Measure Options: AM-PAC 6 Clicks Basic Mobility (PT)  AM-PAC 6 Clicks Score (PT): 9    Hayley Edmonds PT  11/10/2019

## 2019-11-10 NOTE — PLAN OF CARE
Problem: Skin Injury Risk (Adult)  Goal: Identify Related Risk Factors and Signs and Symptoms  Outcome: Ongoing (interventions implemented as appropriate)    Goal: Skin Health and Integrity  Outcome: Ongoing (interventions implemented as appropriate)      Problem: Fall Risk (Adult)  Goal: Identify Related Risk Factors and Signs and Symptoms  Outcome: Ongoing (interventions implemented as appropriate)    Goal: Absence of Fall  Outcome: Ongoing (interventions implemented as appropriate)      Problem: Patient Care Overview  Goal: Individualization and Mutuality  Outcome: Ongoing (interventions implemented as appropriate)    Goal: Discharge Needs Assessment  Outcome: Ongoing (interventions implemented as appropriate)    Goal: Interprofessional Rounds/Family Conf  Outcome: Ongoing (interventions implemented as appropriate)      Problem: Constipation (Adult)  Goal: Identify Related Risk Factors and Signs and Symptoms  Outcome: Ongoing (interventions implemented as appropriate)    Goal: Effective Bowel Elimination  Outcome: Ongoing (interventions implemented as appropriate)    Goal: Comfort  Outcome: Ongoing (interventions implemented as appropriate)

## 2019-11-10 NOTE — PLAN OF CARE
Problem: Patient Care Overview  Goal: Plan of Care Review   11/10/19 4010   Coping/Psychosocial   Plan of Care Reviewed With patient   OTHER   Outcome Summary Pt is a 89 yo female admitted on 11/9/19 with ARF, constipation, weakness, and recent fall. Today required Mod/Max A x 2 with bed mobility. Attemtped STS x 2 trials at RW requiring Max A and unable to fully stand without leaning backwards. Unable to intiate gait due to inablity to stand safely. She presents with generalized weakness (B)LE, R weaker than L, and decreased functional mobility. She may benefit from skilled PT to address functional deficits and increase independence prior to discharge.

## 2019-11-11 NOTE — PLAN OF CARE
Problem: Skin Injury Risk (Adult)  Goal: Identify Related Risk Factors and Signs and Symptoms  Outcome: Ongoing (interventions implemented as appropriate)   11/11/19 1720   Skin Injury Risk (Adult)   Related Risk Factors (Skin Injury Risk) advanced age;cognitive impairment;fluid intake inadequate;mobility impaired;moisture;nutritional deficiencies     Goal: Skin Health and Integrity  Outcome: Ongoing (interventions implemented as appropriate)      Problem: Fall Risk (Adult)  Goal: Identify Related Risk Factors and Signs and Symptoms  Outcome: Ongoing (interventions implemented as appropriate)    Goal: Absence of Fall  Outcome: Ongoing (interventions implemented as appropriate)      Problem: Patient Care Overview  Goal: Plan of Care Review  Outcome: Ongoing (interventions implemented as appropriate)   11/11/19 1720   Coping/Psychosocial   Plan of Care Reviewed With patient   Plan of Care Review   Progress no change   OTHER   Outcome Summary Oriented to name and place. Refused breakfast and lunch. Had several small-medium liq. brown stools incontinently. IVF's. F/C with adequate urine ouput. Telemetry NSR. Nursing to continue to monitor.       Problem: Constipation (Adult)  Goal: Identify Related Risk Factors and Signs and Symptoms  Outcome: Ongoing (interventions implemented as appropriate)   11/11/19 1720   Constipation (Adult)   Related Risk Factors (Constipation) impaired mobility;weakness/fatigue   Signs and Symptoms (Constipation) oozing liquid stool     Goal: Effective Bowel Elimination  Outcome: Ongoing (interventions implemented as appropriate)    Goal: Comfort  Outcome: Ongoing (interventions implemented as appropriate)

## 2019-11-11 NOTE — PLAN OF CARE
Problem: Patient Care Overview  Goal: Plan of Care Review  Outcome: Ongoing (interventions implemented as appropriate)   11/11/19 1501   Coping/Psychosocial   Plan of Care Reviewed With patient   OTHER   Outcome Summary pt agreed to sit EOB; 2 attempts to stand but incomplete; req several cues to keep on task

## 2019-11-11 NOTE — PROGRESS NOTES
Continued Stay Note  [facility]     Patient Name: Maru Richey  MRN: [id number]  Today's Date: 11/11/2019    Admit Date: 11/9/2019    Discharge Plan     Row Name 11/11/19 1503       Plan    Plan  Plan home with [facility] for skilled care.   NIALL Shah RN    Patient/Family in Agreement with Plan  yes    Plan Comments   FACE SHEET VERIFIED/ IM LETTER SIGNED.  Called and spoke with pt's daughter ( Lisa [facility] [phone]).  Pt's PCP is  Dr. Elicia Tobar.   Pt lives in a single story house with daughter  ( Lisa).   Pt 's daughter assists with pt's needs.   Pt has a bath bench, grab bar, rollator, rolling walker, and standard walker for home use.  Pt gets prescriptions at [facility]  ([facility]) but wants to change to [facility] in [place].   Pt's daughter denies any issues affording medications.  Pt has used [facility] in the past and would use again.   [facility] called to follow.   Pt's daughter provided a HH, SNF List and [facility].   Pt's daughter [facility].  Nikki  ( [phone]) called to follow.   Plan home with [facility] or [facility] for skilled care.   NIALL Shah RN        Discharge Codes    No documentation.             Myra Shah, RN

## 2019-11-11 NOTE — DISCHARGE PLACEMENT REQUEST
"Raheem Richey (89 y.o. Female)     Date of Birth Social Security Number Address Home Phone MRN    02/21/1925  [address] [address] [phone] [id number]    [employer] Marital Status          Church Single       Admission Date Admission Type Admitting Provider Attending Provider Department, Room/Bed    11/9/19 Emergency Edy Gongora MD Jackson, Alan David, MD [address], [address]/1    Discharge Date Discharge Disposition Discharge Destination                       Attending Provider:  Jatin Ewing MD    Allergies:  No Known Allergies    Isolation:  None   Infection:  None   Code Status:  CPR    Ht:  172.7 cm (68\")   Wt:  53.6 kg (118 lb 2.7 oz)    Admission Cmt:  None   Principal Problem:  Acute renal failure superimposed on stage 2 chronic kidney disease (CMS/HCC) [N17.9,N18.2]                 Active Insurance as of 11/9/2019     Primary Coverage     Payor Plan Insurance Group Employer/Plan Group    MEDICARE MEDICARE A & B      Payor Plan Address Payor Plan Phone Number Payor Plan Fax Number Effective Dates    [address] [phone]  2/1/1990 - None Entered    [place] [address]       Subscriber Name Subscriber Birth Date Member ID       RAHEEM RICHEY 2/21/1925 [id number]                 Emergency Contacts      (Rel.) Home Phone Work Phone Mobile Phone    JesseeLisa (Daughter) [phone] -- --              "

## 2019-11-11 NOTE — PROGRESS NOTES
"DAILY PROGRESS NOTE  [facility]    Patient Identification:  Name: Maru Richey  Age: 89 y.o.  Sex: female  :  1925  MRN: [id number]         Primary Care Physician: Elicia Tobar MD    Subjective: patient is alert; feels ok; no family is present  Interval History: follow up for jonnathan, ckd, constipation, anemia, hyperlipidemia     Objective:    Scheduled Meds:  aspirin 81 mg Oral Daily   bisacodyl 10 mg Rectal Daily   pantoprazole 40 mg Oral BID AC   polyethylene glycol 17 g Oral BID   senna-docusate sodium 2 tablet Oral BID   sodium chloride 10 mL Intravenous Q12H     Continuous Infusions:  sodium chloride 50 mL/hr Last Rate: 50 mL/hr (19 1418)       Vital signs in last 24 hours:  Temp:  [98 °F (36.7 °C)-99.6 °F (37.6 °C)] 98.4 °F (36.9 °C)  Heart Rate:  [] 96  Resp:  [18] 18  BP: ()/(55-68) 119/65    Intake/Output:    Intake/Output Summary (Last 24 hours) at 2019 1529  Last data filed at 2019 1418  Gross per 24 hour   Intake 1776.67 ml   Output 1400 ml   Net 376.67 ml       Exam:  /65 (BP Location: Left arm, Patient Position: Lying)   Pulse 96   Temp 98.4 °F (36.9 °C) (Oral)   Resp 18   Ht 172.7 cm (68\")   Wt 53.6 kg (118 lb 2.7 oz)   SpO2 96%   BMI 17.97 kg/m²     General Appearance:    Alert, cooperative, no distress, AAOx3                          Head:    Normocephalic, without obvious abnormality, atraumatic                           Eyes:    PERRL, conjunctiva/corneas clear, EOM's intact, both eyes                         Throat:   Lips, tongue, gums normal; oral mucosa pink and moist                           Neck:   Supple, symmetrical, trachea midline, no JVD                         Lungs:    Decreased breath sounds bilaterally, respirations unlabored                 Chest Wall:    No tenderness or deformity                          Heart:    Regular rate and rhythm, S1 and S2 normal, no murmur,no  Rub                                      " or gallop                  Abdomen:     Soft, non-tender, bowel sounds active, no masses, no                                                        organomegaly                  Extremities:   Extremities normal, atraumatic, no cyanosis or edema                        Pulses:   Pulses palpable in all extremities                            Skin:   Skin is warm and dry,  no rashes or palpable lesions                  Neurologic:   CNII-XII intact, motor strength grossly intact, sensation grossly                                         intact to light touch, no focal deficits noted       Data Review:  Labs in chart were reviewed.  Lab Results   Component Value Date    WBC 12.65 (H) 11/11/2019    HGB 10.5 (L) 11/11/2019    HCT 31.1 (L) 11/11/2019     (L) 11/11/2019     Lab Results   Component Value Date     11/11/2019    K 5.1 11/11/2019     11/11/2019    CO2 22.6 11/11/2019    BUN 32 (H) 11/11/2019    CREATININE 0.98 11/11/2019    GLUCOSE 97 11/11/2019     Lab Results   Component Value Date    CALCIUM 9.6 11/11/2019     Lab Results   Component Value Date    AST 14 11/09/2019    ALT 9 11/09/2019    ALKPHOS 68 11/09/2019     Patient Active Problem List   Diagnosis Code   • Syncope R55   • Hypertension I10   • Hyperlipidemia E78.5   • History of peptic ulcer Z87.11   • Weight loss, abnormal R63.4   • Osteoarthritis of multiple joints M15.9   • Compression fracture of T10 vertebra (CMS/[facility]) S22.070A   • Dehydration E86.0   • Knee pain M25.569   • Slow transit constipation K59.01   • Iron deficiency E61.1   • Acute renal failure superimposed on stage 2 chronic kidney disease (CMS/HCC) N17.9, N18.2   • Acute urinary retention R33.8   • Acute kidney failure (CMS/HCC) N17.9   • Chest pain, atypical R07.89       Assessment:    Acute renal failure superimposed on stage 2 chronic kidney disease (CMS/HCC)    Hyperlipidemia    History of peptic ulcer    Osteoarthritis of multiple joints    Slow transit  constipation    Iron deficiency    Acute urinary retention    Acute kidney failure (CMS/HCC)    Chest pain, atypical  anemia  Leukocytosis  underweight  Plan:  Will continue to monitor today  Creatinine has improved with hydratioin and relief of retention  Suspect she will need rehab  hgb stable from yesterday  Hopefully ready for discharge soon  Medium risk  Piedad Brown MD  11/11/2019  3:29 PM

## 2019-11-11 NOTE — PROGRESS NOTES
Discharge Planning Assessment  [facility]     Patient Name: Maru Richey  MRN: [id number]  Today's Date: 11/11/2019    Admit Date: 11/9/2019    Discharge Needs Assessment     Row Name 11/11/19 5639       Living Environment    Lives With  child(latricia), adult    Name(s) of Who Lives With Patient  Daughter ( Lisa Hooks [phone])    Current Living Arrangements  home/apartment/condo    Primary Care Provided by  self    Provides Primary Care For  no one    Family Caregiver if Needed  child(latricia), adult    Family Caregiver Names  Daughter ( Lisa [facility] [phone])    Quality of Family Relationships  helpful    Able to Return to Prior Arrangements  yes    Living Arrangement Comments  Pt lives in one story house with daughter ( Lisa [facility] [phone]).       Resource/Environmental Concerns    Resource/Environmental Concerns  none    Transportation Concerns  car, none       Transition Planning    Patient/Family Anticipates Transition to  inpatient rehabilitation facility    Patient/Family Anticipated Services at Transition  skilled nursing    Transportation Anticipated  family or friend will provide       Discharge Needs Assessment    Readmission Within the Last 30 Days  no previous admission in last 30 days    Concerns to be Addressed  denies needs/concerns at this time    Equipment Currently Used at Home  bath bench;grab bar;rollator;walker, rolling;walker, standard    Anticipated Changes Related to Illness  none    Equipment Needed After Discharge  bath bench;grab bar, tub/shower;rollator;walker, rolling;walker, standard    Offered/Gave Vendor List  yes SNF list and [facility] left for pt's daughter        Discharge Plan     Row Name 11/11/19 7366       Plan    Plan  Plan home with [facility] for skilled care.   NIALL Shah RN    Patient/Family in Agreement with Plan  yes    Plan Comments  FACE SHEET VERIFIED/ IM LETTER SIGNED.  Called and spoke with pt's daughter ( Lisa [facility] [phone]).  Pt's  PCP is  Dr. Elicia Tobar.   Pt lives in a single story house with daughter  ( Lisa).   Pt 's daughter assists with pt's needs.   Pt has a bath bench, grab bar, rollator, rolling walker, and standard walker for home use.  Pt gets prescriptions at [facility]  ([facility]) but wants to change to [facility] in [facility].   Pt's daughter denies any issues affording medications.  Pt has used [facility] in the past and would use again.   [facility] called to follow.   Pt's daughter provided a HH, SNF List and [facility].   Pt's daughter chose [facility] for skilled care.  Nikki  ( [phone]) called to follow.   Plan home with [facility] or [facility] for skilled care.   NIALL Shah RN        Destination      Service Provider Request Status Selected Services Address Phone Number Fax Number    [facility] Pending - Request Sent N/A [id number] [address] [phone]      Durable Medical Equipment      No service coordination in this encounter.      Dialysis/Infusion      No service coordination in this encounter.      Home Medical Care      Service Provider Request Status Selected Services Address Phone Number Fax Number    [facility] [place] Pending - Request Sent N/A [address] [address] [phone]      Therapy      No service coordination in this encounter.      Community Resources      No service coordination in this encounter.          Demographic Summary     Row Name 11/11/19 1453       General Information    Admission Type  inpatient    Arrived From  emergency department    Required Notices Provided  Important Message from Medicare    Referral Source  admission list    Reason for Consult  discharge planning    Preferred Language  English     Used During This Interaction  no        Functional Status     Row Name 11/11/19 1454       Functional Status    Usual Activity Tolerance  moderate     Current Activity Tolerance  poor       Functional Status, IADL    Medications  assistive person    Meal Preparation  assistive person    Housekeeping  completely dependent    Laundry  completely dependent    Shopping  completely dependent       Mental Status    General Appearance WDL  WDL       Mental Status Summary    Recent Changes in Mental Status/Cognitive Functioning  decision making/judgment        Psychosocial    No documentation.       Abuse/Neglect    No documentation.       Legal    No documentation.       Substance Abuse    No documentation.       Patient Forms    No documentation.           Myra Shah RN

## 2019-11-11 NOTE — NURSING NOTE
11/11/19 1043   Wound 11/09/19 1110 Right upper;medial coccyx Pressure Injury   Date first assessed/Time first assessed: 11/09/19 1110   Present on Hospital Admission: Yes  Side: Right  Orientation: upper;medial  Location: coccyx  Primary Wound Type: Pressure Injury  Stage, Pressure Injury: Stage 2   Dressing Appearance intact   Base clean;red   Red (%), Wound Tissue Color 100   Periwound intact   Wound Length (cm) 3 cm   Wound Width (cm) 2 cm   Drainage Characteristics/Odor serosanguineous   Drainage Amount scant   Dressing Care, Wound dressing applied;border dressing;low-adherent   CWOCN consult for stage 2 coccyx pressure injury.  Clean wound bed with scant drainage.  Covered with optifoam bandage.  Patient with liquid bowel movement / incontinence this assessment.  Treat remaining skin with barrier cream.

## 2019-11-11 NOTE — THERAPY TREATMENT NOTE
Acute Care - Physical Therapy Treatment Note  [facility]     Patient Name: Maru Richey  : 1925  MRN: [id number]  Today's Date: 2019             Admit Date: 2019    Visit Dx:    ICD-10-CM ICD-9-CM   1. Constipation, unspecified constipation type K59.00 564.00   2. Generalized weakness R53.1 780.79   3. VENU (acute kidney injury) (CMS/HCC) N17.9 584.9   4. Urinary retention R33.9 788.20     Patient Active Problem List   Diagnosis   • Syncope   • Hypertension   • Hyperlipidemia   • History of peptic ulcer   • Weight loss, abnormal   • Osteoarthritis of multiple joints   • Compression fracture of T10 vertebra (CMS/HCC)   • Dehydration   • Knee pain   • Slow transit constipation   • Iron deficiency   • Acute renal failure superimposed on stage 2 chronic kidney disease (CMS/HCC)   • Acute urinary retention   • Acute kidney failure (CMS/HCC)   • Chest pain, atypical       Therapy Treatment    Rehabilitation Treatment Summary     Row Name 19 1407             Treatment Time/Intention    Discipline  physical therapy assistant  -      Document Type  therapy note (daily note)  -      Subjective Information  complains of;fatigue  -      Mode of Treatment  individual therapy;physical therapy  -      Care Plan Review  patient/other agree to care plan  -      Comment  some confusion, but agreeable to PT  -      Recorded by [KULWANT] Lisa Richey PTA 19 1500      Row Name 19 1402             Bed Mobility Assessment/Treatment    Rolling Right S[place]th (Bed Mobility)  moderate assist (50% patient effort)  -      Supine-Sit Smyth (Bed Mobility)  2 person assist;maximum assist (25% patient effort)  -      Sit-Supine Smyth (Bed Mobility)  2 person assist;maximum assist (25% patient effort);dependent (less than 25% patient effort)  -      Bed Mobility, Safety Issues  cognitive deficits limit understanding;decreased use of arms for pushing/pulling;decreased use of  legs for bridging/pushing;impaired trunk control for bed mobility  -JM      Comment (Bed Mobility)  initial post lean, once sitting req CGA  -JM      Recorded by [JM] Lisa Richey, PTA 11/11/19 1500      Row Name 11/11/19 1407             Sit-Stand Transfer    Sit-Stand [place] (Transfers)  2 person assist;maximum assist (25% patient effort);dependent (less than 25% patient effort) attempted x 2  -JM      Assistive Device (Sit-Stand Transfers)  -- HHA-2  -JM      Recorded by [JM] Lisa Richey, PTA 11/11/19 1500      Row Name 11/11/19 1407             Therapeutic Exercise    Lower Extremity (Therapeutic Exercise)  LAQ (long arc quad), bilateral  -JM      Position (Therapeutic Exercise)  seated  -JM      Sets/Reps (Therapeutic Exercise)  10 reps, cues to continue   -JM      Recorded by [] Lisa Richey, PTA 11/11/19 1500      Row Name 11/11/19 1407             Positioning and Restraints    Pre-Treatment Position  in bed  -JM      In Bed  fowlers;call light within reach;encouraged to call for assist;with nsg pt needs to be cleaned up   -JM      Recorded by [JM] Lisa Richey, PTA 11/11/19 1500      Row Name 11/11/19 1407             Pain Scale: Word Pre/Post-Treatment    Pain: Word Scale, Pretreatment  0 - no pain  -      Pain: Word Scale, Post-Treatment  0 - no pain  -JM      Recorded by [] Lisa Richey, PTA 11/11/19 1500      Row Name                Wound 11/09/19 1110 Right upper;medial coccyx Pressure Injury    Wound - Properties Group Date first assessed: 11/09/19 [LH] Time first assessed: 1110 [LH] Present on Hospital Admission: Y [LH] Side: Right [LH2] Orientation: upper;medial [LH] Location: coccyx [LH] Primary Wound Type: Pressure inj [LH] Stage, Pressure Injury: Stage 2 [LH] Recorded by:  [] Jody Lee RN 11/09/19 1207 [LH2] Jody Lee RN 11/09/19 1208      User Key  (r) = Recorded By, (t) = Taken By, (c) = Cosigned By    Initials Name Effective Dates Discipline      Jody Lee RN 06/16/16 -  Nurse    Lisa Becerra PTA 03/07/18 -  PT          Wound 11/09/19 1110 Right upper;medial coccyx Pressure Injury (Active)   Dressing Appearance intact 11/11/2019 10:43 AM   Closure None 11/11/2019 12:29 AM   Base clean;red 11/11/2019 10:43 AM   Red (%), Wound Tissue Color 100 11/11/2019 10:43 AM   Periwound intact 11/11/2019 10:43 AM   Wound Length (cm) 3 cm 11/11/2019 10:43 AM   Wound Width (cm) 2 cm 11/11/2019 10:43 AM   Drainage Characteristics/Odor serosanguineous 11/11/2019 10:43 AM   Drainage Amount scant 11/11/2019 10:43 AM   Care, Wound cleansed with;soap and water 11/11/2019  5:40 AM   Dressing Care, Wound dressing applied;border dressing;low-adherent 11/11/2019 10:43 AM           Physical Therapy Education     Title: PT OT SLP Therapies (Not Started)     Topic: Physical Therapy (In Progress)     Point: Mobility training (In Progress)     Learning Progress Summary           Patient Acceptance, E,D, NR by KULWANT at 11/11/2019  3:02 PM    Acceptance, E,D, NR by TERRENCE at 11/10/2019  3:35 PM                   Point: Home exercise program (In Progress)     Learning Progress Summary           Patient Acceptance, E,D, NR by KULWANT at 11/11/2019  3:02 PM    Acceptance, E,D, NR by TERRENCE at 11/10/2019  3:35 PM                   Point: Body mechanics (In Progress)     Learning Progress Summary           Patient Acceptance, E,D, NR by KULWANT at 11/11/2019  3:02 PM    Acceptance, E,D, NR by TERRENCE at 11/10/2019  3:35 PM                   Point: Precautions (In Progress)     Learning Progress Summary           Patient Acceptance, E,D, NR by KULWANT at 11/11/2019  3:02 PM    Acceptance, E,D, NR by TERRENCE at 11/10/2019  3:35 PM                               User Key     Initials Effective Dates Name Provider Type Discipline     03/07/18 -  Lisa Richey PTA Physical Therapy Assistant PT     09/17/19 -  Hayley Edmonds PT Physical Therapist PT                PT Recommendation and Plan     Plan of Care  Reviewed With: patient  Outcome Summary: pt agreed to sit EOB; 2 attempts to stand but incomplete; req several cues to keep on task  Outcome Measures     Row Name 11/11/19 1500             How much help from another person do you currently need...    Turning from your back to your side while in flat bed without using bedrails?  2  -JM      Moving from lying on back to sitting on the side of a flat bed without bedrails?  2  -JM      Moving to and from a bed to a chair (including a wheelchair)?  1  -JM      Standing up from a chair using your arms (e.g., wheelchair, bedside chair)?  2  -JM      Climbing 3-5 steps with a railing?  1  -JM      To walk in hospital room?  1  -JM      AM-PAC 6 Clicks Score (PT)  9  -        User Key  (r) = Recorded By, (t) = Taken By, (c) = Cosigned By    Initials Name Provider Type    Lisa Becerra PTA Physical Therapy Assistant         Time Calculation:   PT Charges     Row Name 11/11/19 1402             Time Calculation    Start Time  1345  -      Stop Time  1401  -      Time Calculation (min)  16 min  -      PT Received On  11/11/19  -KULWANT      PT - Next Appointment  11/12/19  -KULWANT        User Key  (r) = Recorded By, (t) = Taken By, (c) = Cosigned By    Initials Name Provider Type    Lisa Becerra PTA Physical Therapy Assistant        Therapy Charges for Today     Code Description Service Date Service Provider Modifiers Qty    34146566365 HC PT THER PROC EA 15 MIN 11/11/2019 Lisa Richey PTA GP 1    79674386087 HC PT THER SUPP EA 15 MIN 11/11/2019 Lisa Richey PTA GP 1          PT G-Codes  Outcome Measure Options: AM-PAC 6 Clicks Basic Mobility (PT)  AM-PAC 6 Clicks Score (PT): 9    Lisa Richey PTA  11/11/2019

## 2019-11-11 NOTE — DISCHARGE PLACEMENT REQUEST
"Raheem Richey (89 y.o. Female)     Date of Birth Social Security Number Address Home Phone MRN    02/21/1925  [address] [address] [phone] [id number]    Sabianist Marital Status          [facility] Single       Admission Date Admission Type Admitting Provider Attending Provider Department, Room/Bed    11/9/19 Emergency Edy Gongora MD Jackson, Alan David, MD [address], [address]/1    Discharge Date Discharge Disposition Discharge Destination                       Attending Provider:  Jatin Ewing MD    Allergies:  No Known Allergies    Isolation:  None   Infection:  None   Code Status:  CPR    Ht:  172.7 cm (68\")   Wt:  53.6 kg (118 lb 2.7 oz)    Admission Cmt:  None   Principal Problem:  Acute renal failure superimposed on stage 2 chronic kidney disease (CMS/HCC) [N17.9,N18.2]                 Active Insurance as of 11/9/2019     Primary Coverage     Payor Plan Insurance Group Employer/Plan Group    MEDICARE MEDICARE A & B      Payor Plan Address Payor Plan Phone Number Payor Plan Fax Number Effective Dates    [address] [phone]  2/1/1990 - None Entered    [place] [address]       Subscriber Name Subscriber Birth Date Member ID       RAHEEM RICHEY 2/21/1925 [id number]                 Emergency Contacts      (Rel.) Home Phone Work Phone Mobile Phone    JesseeLisa (Daughter) [phone] -- --              "

## 2019-11-11 NOTE — TELEPHONE ENCOUNTER
Please let patient's daughter know I am sorry I wasn't available for her call on Friday, I had already left the office for the weekend. With the symptoms described I would have recommended patient go to the ER, from what I can see patient is currently admitted to the hospital, I'm glad she's in a place getting good care now and once again am sorry I was unavailable to address her concern.

## 2019-11-11 NOTE — PROGRESS NOTES
Adult Nutrition  Assessment/PES    Patient Name:  Maru Richey  YOB: 1925  MRN: [id number]  Admit Date:  11/9/2019    Assessment Date:  11/11/2019    Comments:  Assessment triggered by BMI < 19 and skin report.  Patient with stage II pressure injury to coccyx.  Admitted s/p fall, weakness.  ARF.  CT upon admission showed massive amount of fecal matter in colon per chart review.  KUB this am.  Possible d/c soon.    Per chart weight report, weight stable x 2 months and up 8/9# x 1 month.  Patient seems somewhat confused at time of visit.  88% x 2 meals per chart PO data.  Adding Boost BID as per last admission.    RD to continue to follow.    Reason for Assessment     Row Name 11/11/19 1606          Reason for Assessment    Reason For Assessment  identified at risk by screening criteria     Diagnosis  other (see comments);gastrointestinal disease;cardiac disease;renal disease OA, peptic ulcer, iron deficiency anemia, compression fracture, HLD, HTN; adm with acute renal failure     Identified At Risk by Screening Criteria  large or nonhealing wound, burn or pressure injury;BMI         Anthropometrics     Row Name 11/11/19 1608          Admit Weight    Admit Weight  -- 118# 11/9        Usual Body Weight (UBW)    Weight Loss Time Frame  up 8/9# x 1 month; weight stable x 2 months        Body Mass Index (BMI)    BMI Assessment  BMI 17-18.4: protein-energy malnutrition grade I         Labs/Tests/Procedures/Meds     Row Name 11/11/19 1608          Labs/Procedures/Meds    Lab Results Reviewed  reviewed, pertinent     Lab Results Comments  BUN, WBC, Hgb, Hct, Platelets        Diagnostic Tests/Procedures    Diagnostic Test/Procedure Reviewed  reviewed, pertinent     Diagnostic Test/Procedures Comments  CT upon admission showed massive amount of fecal matter in colon; KUB this am        Medications    Pertinent Medications Reviewed  reviewed, pertinent     Pertinent Medications Comments  dulcolax, protonix,  miralax, senokot, IVFs          Physical Findings     Row Name 11/11/19 1610          Physical Findings    Overall Physical Appearance  underweight     Skin  pressure injury B=15, st II coccyx         Estimated/Assessed Needs     Row Name 11/11/19 1610          Calculation Measurements    Weight Used For Calculations  53.6 kg (118 lb 2.7 oz)        Estimated/Assessed Needs       KCAL/KG    KCAL/KG  30 Kcal/Kg (kcal);35 Kcal/Kg (kcal)     30 Kcal/Kg (kcal)  1608     35 Kcal/Kg (kcal)  1876        Protein Requirements    Weight Used For Protein Calculations  53.6 kg (118 lb 2.7 oz)     Est Protein Requirement Amount (gms/kg); used 1.2-1.5 g/kg  64-80        Fluid Requirements    Estimated Fluid Requirements (mL/day)  1608         Nutrition Prescription Ordered     Row Name 11/11/19 1611          Nutrition Prescription PO    Current PO Diet  Regular         Evaluation of Received Nutrient/Fluid Intake     Row Name 11/11/19 1611          PO Evaluation    Number of Meals  2     % PO Intake  88         Problem/Interventions:  Problem 1     Row Name 11/11/19 1611          Nutrition Diagnoses Problem 1    Problem 1  Increased Nutrient Needs     Macronutrient  Kcal;Protein     Etiology (related to)  Medical Diagnosis     Skin  Pressure injury     Signs/Symptoms (evidenced by)  Report/Observation         Intervention Goal     Row Name 11/11/19 1611          Intervention Goal    General  Maintain nutrition;Reduce/improve symptoms;Disease management/therapy;Meet nutritional needs for age/condition     PO  Maintain intake     Weight  Appropriate weight gain         Nutrition Intervention     Row Name 11/11/19 1611          Nutrition Intervention    RD/Tech Action  Follow Tx progress;Care plan reviewd;Encourage intake;Recommend/ordered     Recommended/Ordered  Supplement         Nutrition Prescription     Row Name 11/11/19 1611          Nutrition Prescription PO    PO Prescription  Begin/change supplement     Supplement  Boost  Plus will keep this on from last admission     Supplement Frequency  2 times a day     New PO Prescription Ordered?  Yes         Education/Evaluation     Row Name 11/11/19 3932          Education    Education  Will Instruct as appropriate        Monitor/Evaluation    Monitor  Per protocol;PO intake;Supplement intake;Pertinent labs;Weight;Skin status;Symptoms     Education Follow-up  Reinforce PRN           Electronically signed by:  Shalini Godoy RD  11/11/19 4:12 PM

## 2019-11-11 NOTE — PLAN OF CARE
Problem: Skin Injury Risk (Adult)  Goal: Skin Health and Integrity  Outcome: Ongoing (interventions implemented as appropriate)      Problem: Fall Risk (Adult)  Goal: Absence of Fall  Outcome: Ongoing (interventions implemented as appropriate)      Problem: Patient Care Overview  Goal: Plan of Care Review  Outcome: Ongoing (interventions implemented as appropriate)   11/11/19 0340   Coping/Psychosocial   Plan of Care Reviewed With patient   Plan of Care Review   Progress improving   OTHER   Outcome Summary Patient alert, disoriented to time. No complaints of pain, SOB or nausea. Continue IV fluids. Velazquez cath in place, care completed. Drsg to coccyx changed. Patient was able to have BM that was loose, moderate in amount. Resports feeling much better after. KUB this AM. Vital signs stable. No acute distress noted. Will continue to monitor.       Problem: Constipation (Adult)  Goal: Effective Bowel Elimination  Outcome: Ongoing (interventions implemented as appropriate)    Goal: Comfort  Outcome: Ongoing (interventions implemented as appropriate)

## 2019-11-12 NOTE — PROGRESS NOTES
"DAILY PROGRESS NOTE  [facility]    Patient Identification:  Name: Maru Richey  Age: 89 y.o.  Sex: female  :  1925  MRN: [id number]         Primary Care Physician: Elicia Tobar MD    Subjective: patient is alert; denies pain; mumbles and is difficulty to understand; no family is present  Interval History: follow up for  Nash, anemia, weakness, underweight, decubitus stage 2 coccyx  Hypertension, anemia  Objective:    Scheduled Meds:  aspirin 81 mg Oral Daily   bisacodyl 10 mg Rectal Daily   pantoprazole 40 mg Oral BID AC   polyethylene glycol 17 g Oral BID   senna-docusate sodium 2 tablet Oral BID   sodium chloride 10 mL Intravenous Q12H     Continuous Infusions:  sodium chloride 50 mL/hr Last Rate: 50 mL/hr (19 1418)       Vital signs in last 24 hours:  Temp:  [97.4 °F (36.3 °C)-98.1 °F (36.7 °C)] 98.1 °F (36.7 °C)  Heart Rate:  [81-96] 96  Resp:  [16-18] 16  BP: (113-139)/(73-82) 136/81    Intake/Output:    Intake/Output Summary (Last 24 hours) at 2019 1758  Last data filed at 2019 1310  Gross per 24 hour   Intake 650 ml   Output 1200 ml   Net -550 ml       Exam:  /81 (BP Location: Left arm, Patient Position: Lying)   Pulse 96   Temp 98.1 °F (36.7 °C) (Oral)   Resp 16   Ht 172.7 cm (68\")   Wt 53.6 kg (118 lb 2.7 oz)   SpO2 97%   BMI 17.97 kg/m²     General Appearance:    Alert, cooperative, no distress, AAOx1; frail, thin, chronically ill-appearing                          Head:    Normocephalic, without obvious abnormality, atraumatic; bitemporal wasting                           Eyes:    PERRL, conjunctiva/corneas clear, EOM's intact, both eyes                         Throat:   Lips, tongue, gums normal; oral mucosa pink and moist                           Neck:   Supple, symmetrical, trachea midline, no JVD                         Lungs:    Decreased breath sounds bilaterally, respirations unlabored                 Chest Wall:    No tenderness or " deformity                          Heart:    Regular rate and rhythm, S1 and S2 normal, no murmur,no  Rub                                      or gallop                  Abdomen:     Soft, non-tender, bowel sounds active, no masses, no                                                        organomegaly                  Extremities:   Extremities normal, atraumatic, no cyanosis or edema                        Pulses:   Pulses palpable in all extremities                            Skin:   Skin is warm and dry,  no rashes or palpable lesions                  Neurologic:   CNII-XII intact, motor strength grossly intact, sensation grossly                                         intact to light touch, no focal deficits noted       Data Review:  Labs in chart were reviewed.  Lab Results   Component Value Date    WBC 12.06 (H) 11/12/2019    HGB 9.4 (L) 11/12/2019    HCT 29.0 (L) 11/12/2019     (L) 11/12/2019     Lab Results   Component Value Date     11/12/2019    K 4.4 11/12/2019     11/12/2019    CO2 23.6 11/12/2019    BUN 23 11/12/2019    CREATININE 0.87 11/12/2019    GLUCOSE 78 11/12/2019     Lab Results   Component Value Date    CALCIUM 9.4 11/12/2019    MG 2.3 11/12/2019     No results found for: AST, ALT, ALKPHOS  No results found for: APTT, INR  Patient Active Problem List   Diagnosis Code   • Syncope R55   • Hypertension I10   • Hyperlipidemia E78.5   • History of peptic ulcer Z87.11   • Weight loss, abnormal R63.4   • Osteoarthritis of multiple joints M15.9   • Compression fracture of T10 vertebra (CMS/[facility]) S22.070A   • Dehydration E86.0   • Knee pain M25.569   • Slow transit constipation K59.01   • Iron deficiency E61.1   • Acute renal failure superimposed on stage 2 chronic kidney disease (CMS/HCC) N17.9, N18.2   • Acute urinary retention R33.8   • Acute kidney failure (CMS/[facility]) N17.9   • Chest pain, atypical R07.89       Assessment:    Acute renal failure superimposed on stage 2 chronic  kidney disease (CMS/HCC)    Hyperlipidemia    History of peptic ulcer    Osteoarthritis of multiple joints    Slow transit constipation    Iron deficiency    Acute urinary retention    Acute kidney failure (CMS/HCC)    Chest pain, atypical  anemia  Stage 2 decubitus coccyx poa  underweight  Weight loss  Plan:  Will continue to monitor  Recheck labs in the am  Will most likely need rehab  Creatinine has improved  Po intake remains poor  hgb is down a little  Will recheck in am  Local care for decubitus  D/w   Medium risk    Piedad Brown MD  11/12/2019  5:58 PM

## 2019-11-12 NOTE — PLAN OF CARE
Problem: Skin Injury Risk (Adult)  Goal: Skin Health and Integrity  Outcome: Ongoing (interventions implemented as appropriate)    Goal: Identify Related Risk Factors and Signs and Symptoms  Outcome: Ongoing (interventions implemented as appropriate)    Goal: Skin Health and Integrity  Outcome: Ongoing (interventions implemented as appropriate)      Problem: Fall Risk (Adult)  Goal: Identify Related Risk Factors and Signs and Symptoms  Outcome: Ongoing (interventions implemented as appropriate)    Goal: Absence of Fall  Outcome: Ongoing (interventions implemented as appropriate)      Problem: Patient Care Overview  Goal: Plan of Care Review  Outcome: Ongoing (interventions implemented as appropriate)   11/12/19 0352   Coping/Psychosocial   Plan of Care Reviewed With patient   Plan of Care Review   Progress no change   OTHER   Outcome Summary Pt slept most of the night. Requested pain med arount midnight. Small solid BM during shift. VSS. Will cont to monitor.       Problem: Constipation (Adult)  Goal: Effective Bowel Elimination  Outcome: Ongoing (interventions implemented as appropriate)

## 2019-11-12 NOTE — THERAPY TREATMENT NOTE
Patient Name: Maru Richey  : 1925    MRN: [id number]                              Today's Date: 2019       Admit Date: 2019    Visit Dx:     ICD-10-CM ICD-9-CM   1. Constipation, unspecified constipation type K59.00 564.00   2. Generalized weakness R53.1 780.79   3. VENU (acute kidney injury) (CMS/HCC) N17.9 584.9   4. Urinary retention R33.9 788.20     Patient Active Problem List   Diagnosis   • Syncope   • Hypertension   • Hyperlipidemia   • History of peptic ulcer   • Weight loss, abnormal   • Osteoarthritis of multiple joints   • Compression fracture of T10 vertebra (CMS/HCC)   • Dehydration   • Knee pain   • Slow transit constipation   • Iron deficiency   • Acute renal failure superimposed on stage 2 chronic kidney disease (CMS/HCC)   • Acute urinary retention   • Acute kidney failure (CMS/HCC)   • Chest pain, atypical     Past Medical History:   Diagnosis Date   • Hyperlipidemia    • Hypertension    • Iron deficiency      Past Surgical History:   Procedure Laterality Date   • APPENDECTOMY     • HYSTERECTOMY       General Information     Row Name 19 1646          PT Evaluation Time/Intention    Document Type  therapy note (daily note)  -PH     Mode of Treatment  physical therapy  -PH     Row Name 19 7387          Safety Issues, Functional Mobility    Safety Issues Affecting Function (Mobility)  sequencing abilities  -PH     Impairments Affecting Function (Mobility)  balance;endurance/activity tolerance;strength  -PH       User Key  (r) = Recorded By, (t) = Taken By, (c) = Cosigned By    Initials Name Provider Type    PH Silas, Erendira, PTA Physical Therapy Assistant        Mobility     Row Name 19 0551          Bed Mobility Assessment/Treatment    Bed Mobility Assessment/Treatment  supine-sit;sit-supine;rolling left;scooting/bridging  -PH     [place] Level (Bed Mobility)  moderate assist (50% patient effort);2 person assist  -PH     Rolling Left [place]  (Bed Mobility)  moderate assist (50% patient effort)  -PH     Scooting/Bridging [place] (Bed Mobility)  maximum assist (25% patient effort);2 person assist  -PH     Supine-Sit [place] (Bed Mobility)  moderate assist (50% patient effort);2 person assist  -PH     Sit-Supine [place] (Bed Mobility)  maximum assist (25% patient effort);2 person assist  -PH     Assistive Device (Bed Mobility)  bed rails;draw sheet;head of bed elevated  -PH     Comment (Bed Mobility)  pt attempted to assist w/ movement of BLEs  -PH     Row Name 11/12/19 1647          Transfer Assessment/Treatment    Comment (Transfers)  1st attempt - mod A w/ wt shift backward uncorrected by verbal and tactile cues; 2nd attempt: better wt shift forward once standing; 3 attempt: better wt shift forward once standing  -PH     Row Name 11/12/19 1647          Sit-Stand Transfer    Sit-Stand [place] (Transfers)  minimum assist (75% patient effort);moderate assist (50% patient effort);2 person assist  -PH     Assistive Device (Sit-Stand Transfers)  other (see comments) HHA x 2  -PH     Row Name 11/12/19 1647          Gait/Stairs Assessment/Training    Gait/Stairs Assessment/Training  gait/ambulation assistive device  -PH     [place] Level (Gait)  verbal cues;nonverbal cues (demo/gesture);moderate assist (50% patient effort);2 person assist;minimum assist (75% patient effort)  -PH     Assistive Device (Gait)  other (see comments) HHA x 2  -PH     Distance in Feet (Gait)  5-8 steps forward then back x 2; assist in R & L wt shift  -PH     Pattern (Gait)  step-to  -PH     Deviations/Abnormal Patterns (Gait)  base of support, narrow;stride length decreased;sudha decreased;gait speed decreased  -PH     Bilateral Gait Deviations  weight shift ability decreased;forward flexed posture;heel strike decreased  -PH       User Key  (r) = Recorded By, (t) = Taken By, (c) = Cosigned By    Initials Name Provider Type    PH [place], Erendira, PTA  Physical Therapy Assistant        Obj/Interventions     Row Name 11/12/19 1653          Therapeutic Exercise    Exercise Type (Therapeutic Exercise)  AAROM (active assistive range of motion);AROM (active range of motion)  -PH     Position (Therapeutic Exercise)  seated  -PH     Sets/Reps (Therapeutic Exercise)  1/10  -PH     Comment (Therapeutic Exercise)  B LE AP, marching, hip abd w/ pillow, LAQ x 10  -PH     Row Name 11/12/19 1653          Static Sitting Balance    Level of [place] (Unsupported Sitting, Static Balance)  contact guard assist;minimal assist, 75% patient effort;1 person assist  -PH     Sitting Position (Unsupported Sitting, Static Balance)  sitting on edge of bed  -PH     Time Able to Maintain Position (Unsupported Sitting, Static Balance)  1 to 2 minutes  -PH     Comment (Unsupported Sitting, Static Balance)  performed x 3  -PH       User Key  (r) = Recorded By, (t) = Taken By, (c) = Cosigned By    Initials Name Provider Type    PH [place], Erendira, PTA Physical Therapy Assistant        Goals/Plan    No documentation.       Clinical Impression     [facility] [place] 1655          Pain Assessment    Additional Documentation  Pain Scale: FACES Pre/Post-Treatment (Group)  -PH     Row Name 11/12/19 1655          Pain Scale: Numbers Pre/Post-Treatment    Pain Location - Side  Left  -PH     Pain Location  knee  -PH     Pain Intervention(s)  Repositioned;Rest;Ambulation/increased activity  -PH     [facility] [place]/12/19 1655          Pain Scale: FACES Pre/Post-Treatment    Pre/Post Treatment Pain Comment  Pt grimaced and expressed pain in L knee when L LE moved from bed  -PH     Row Name 11/12/19 1655          Vital Signs    Pre SpO2 (%)  95  -PH     O2 Delivery Pre Treatment  room air  -PH     O2 Delivery Intra Treatment  room air  -PH     Post SpO2 (%)  96  -PH     O2 Delivery Post Treatment  room air  -PH     Row Name 11/12/19 1655          Positioning and Restraints    Pre-Treatment Position  in  bed  -PH     Post Treatment Position  bed  -PH     In Bed  call light within reach;encouraged to call for assist;exit alarm on;side lying left  -PH       User Key  (r) = Recorded By, (t) = Taken By, (c) = Cosigned By    Initials Name Provider Type    Erendira Nevarez PTA Physical Therapy Assistant        Outcome Measures     Row Name 11/12/19 5379          How much help from another person do you currently need...    Turning from your back to your side while in flat bed without using bedrails?  3  -PH     Moving from lying on back to sitting on the side of a flat bed without bedrails?  2  -PH     Moving to and from a bed to a chair (including a wheelchair)?  3  -PH     Standing up from a chair using your arms (e.g., wheelchair, bedside chair)?  3  -PH     Climbing 3-5 steps with a railing?  1  -PH     To walk in hospital room?  1  -PH     AM-PAC 6 Clicks Score (PT)  13  -PH       User Key  (r) = Recorded By, (t) = Taken By, (c) = Cosigned By    Initials Name Provider Type    Erendira Nevarez PTA Physical Therapy Assistant        Physical Therapy Education     Title: PT OT SLP Therapies (Done)     Topic: Physical Therapy (Done)     Point: Mobility training (Done)     Learning Progress Summary           Patient Acceptance, E,TB,D, DU,NR by  at 11/12/2019  4:56 PM    Acceptance, E,TB, VU by DASHA at 11/12/2019  1:57 AM    Acceptance, E,D, NR by UKLWANT at 11/11/2019  3:02 PM    Acceptance, E,D, NR by TERRENCE at 11/10/2019  3:35 PM                   Point: Home exercise program (Done)     Learning Progress Summary           Patient Acceptance, E,TB,D, DU,NR by  at 11/12/2019  4:56 PM    Acceptance, E,TB, VU by DASHA at 11/12/2019  1:57 AM    Acceptance, E,D, NR by KULWANT at 11/11/2019  3:02 PM    Acceptance, E,D, NR by TERRENCE at 11/10/2019  3:35 PM                   Point: Body mechanics (Done)     Learning Progress Summary           Patient Acceptance, E,TB,D, DU,NR by  at 11/12/2019  4:56 PM    Acceptance, E,TB, VU  by  at 11/12/2019  1:57 AM    Acceptance, E,D, NR by  at 11/11/2019  3:02 PM    Acceptance, E,D, NR by  at 11/10/2019  3:35 PM                   Point: Precautions (Done)     Learning Progress Summary           Patient Acceptance, E,TB,D, DU,NR by  at 11/12/2019  4:56 PM    Acceptance, E,TB, VU by  at 11/12/2019  1:57 AM    Acceptance, E,D, NR by  at 11/11/2019  3:02 PM    Acceptance, E,D, NR by  at 11/10/2019  3:35 PM                               User Key     Initials Effective Dates Name Provider Type Discipline     03/07/18 -  Lisa Richey PTA Physical Therapy Assistant PT     06/16/16 -  Ede Russell RN Registered Nurse Nurse     08/20/19 -  Erendira Rosen PTA Physical Therapy Assistant PT     09/17/19 -  Hayley Edmonds PT Physical Therapist PT              PT Recommendation and Plan     Plan of Care Reviewed With: patient  Outcome Summary: Pt performed gait of 5-8 steps forward and backward, cueing and assist in wt shift R & L w/ Mod x 2. Pt may benefit from skilled PT to improve endurance, strength, and mobility.      Time Calculation:   PT Charges     Row Name 11/12/19 1700             Time Calculation    Start Time  1557  -PH      Stop Time  1616  -PH      Time Calculation (min)  19 min  -PH      PT Received On  11/12/19  -      PT - Next Appointment  11/13/19  -        User Key  (r) = Recorded By, (t) = Taken By, (c) = Cosigned By    Initials Name Provider Type     Erendira Rosen PTA Physical Therapy Assistant        Therapy Charges for Today     Code Description Service Date Service Provider Modifiers Qty    73792116235 HC PT THER PROC EA 15 MIN 11/12/2019 Erendira Rosen PTA GP 1    85904106493 HC PT THER SUPP EA 15 MIN 11/12/2019 Erendira Rosen PTA GP 1          PT G-Codes  Outcome Measure Options: AM-PAC 6 Clicks Basic Mobility (PT)  AM-PAC 6 Clicks Score (PT): 13    Erendira Rosen PTA  11/12/2019

## 2019-11-12 NOTE — PLAN OF CARE
Problem: Patient Care Overview  Goal: Plan of Care Review  Outcome: Ongoing (interventions implemented as appropriate)   11/12/19 4437   Coping/Psychosocial   Plan of Care Reviewed With patient   OTHER   Outcome Summary Pt performed gait of 5-8 steps forward and backward, cueing and assist in wt shift R & L w/ Mod x 2. Pt may benefit from skilled PT to improve endurance, strength, and mobility.

## 2019-11-12 NOTE — PLAN OF CARE
Problem: Patient Care Overview  Goal: Plan of Care Review  Outcome: Ongoing (interventions implemented as appropriate)   11/12/19 1028   Coping/Psychosocial   Plan of Care Reviewed With patient   Plan of Care Review   Progress no change   OTHER   Outcome Summary pt continues to drool. no s/s of acute distress.

## 2019-11-13 NOTE — PLAN OF CARE
Problem: Skin Injury Risk (Adult)  Goal: Identify Related Risk Factors and Signs and Symptoms  Outcome: Outcome(s) achieved Date Met: 11/13/19    Goal: Skin Health and Integrity  Outcome: Ongoing (interventions implemented as appropriate)    Goal: Identify Related Risk Factors and Signs and Symptoms  Outcome: Outcome(s) achieved Date Met: 11/13/19    Goal: Skin Health and Integrity  Outcome: Ongoing (interventions implemented as appropriate)      Problem: Fall Risk (Adult)  Goal: Identify Related Risk Factors and Signs and Symptoms  Outcome: Outcome(s) achieved Date Met: 11/13/19    Goal: Absence of Fall  Outcome: Ongoing (interventions implemented as appropriate)      Problem: Patient Care Overview  Goal: Plan of Care Review  Outcome: Ongoing (interventions implemented as appropriate)   11/13/19 0649   Coping/Psychosocial   Plan of Care Reviewed With patient   Plan of Care Review   Progress no change   OTHER   Outcome Summary Patient resting. Still having loose watery stool. Took night shift med without difficult. No complaint of pain. Nursinmg will continue to monitor.     Goal: Individualization and Mutuality  Outcome: Ongoing (interventions implemented as appropriate)    Goal: Discharge Needs Assessment  Outcome: Ongoing (interventions implemented as appropriate)    Goal: Interprofessional Rounds/Family Conf  Outcome: Ongoing (interventions implemented as appropriate)      Problem: Constipation (Adult)  Goal: Identify Related Risk Factors and Signs and Symptoms  Outcome: Outcome(s) achieved Date Met: 11/13/19    Goal: Effective Bowel Elimination  Outcome: Ongoing (interventions implemented as appropriate)    Goal: Comfort  Outcome: Ongoing (interventions implemented as appropriate)

## 2019-11-13 NOTE — CONSULTS
"Yoselin  Pt=Tenriism  Other family members=Catholic    Importance  Yoselin highly important to pt    Community  Pt's daughter offers support to pt  Unable to assess for other communities of support    Assessment  Pt uses yoselin for positive coping.    Pt possibly lacks communities of emotional/spiritual support    Pt stated desire to \"go home w daughter\"     I recommend continued emotional/spiritual support from staff throughout pt's stay.      "

## 2019-11-13 NOTE — PLAN OF CARE
Problem: Patient Care Overview  Goal: Plan of Care Review  Outcome: Ongoing (interventions implemented as appropriate)   11/13/19 5276   Coping/Psychosocial   Plan of Care Reviewed With patient   Plan of Care Review   Progress improving   OTHER   Outcome Summary able to amb short dist today, pt improved sitting balance and agreeable to PT, facility at [place]

## 2019-11-13 NOTE — THERAPY TREATMENT NOTE
Acute Care - Physical Therapy Treatment Note  [facility]     Patient Name: Maru Richey  : 1925  MRN: [id number]  Today's Date: 2019             Admit Date: 2019    Visit Dx:    ICD-10-CM ICD-9-CM   1. Constipation, unspecified constipation type K59.00 564.00   2. Generalized weakness R53.1 780.79   3. VENU (acute kidney injury) (CMS/HCC) N17.9 584.9   4. Urinary retention R33.9 788.20     Patient Active Problem List   Diagnosis   • Syncope   • Hypertension   • Hyperlipidemia   • History of peptic ulcer   • Weight loss, abnormal   • Osteoarthritis of multiple joints   • Compression fracture of T10 vertebra (CMS/HCC)   • Dehydration   • Knee pain   • Slow transit constipation   • Iron deficiency   • Acute renal failure superimposed on stage 2 chronic kidney disease (CMS/HCC)   • Acute urinary retention   • Acute kidney failure (CMS/HCC)   • Chest pain, atypical       Therapy Treatment    Rehabilitation Treatment Summary     Row Name 19 1225             Treatment Time/Intention    Discipline  physical therapy assistant  -      Document Type  therapy note (daily note)  -      Subjective Information  complains of;fatigue  -      Mode of Treatment  individual therapy;physical therapy  -      Care Plan Review  patient/other agree to care plan  -      Existing Precautions/Restrictions  fall;oxygen therapy device and L/min  -      Recorded by [] Lisa Richey, [facility] 19 1329      Row Name 19 1225             Bed Mobility Assessment/Treatment    Rolling Right [place] (Bed Mobility)  moderate assist (50% patient effort)  -      Supine-Sit [place] (Bed Mobility)  2 person assist;maximum assist (25% patient effort)  -      Sit-Supine [place] (Bed Mobility)  2 person assist;maximum assist (25% patient effort);dependent (less than 25% patient effort)  -      Bed Mobility, Safety Issues  decreased use of arms for pushing/pulling;decreased use of legs for  bridging/pushing  -      Assistive Device (Bed Mobility)  bed rails;draw sheet  -      Comment (Bed Mobility)  improved trunk control  -JM      Recorded by [] Lisa Richey, hospitals 11/13/19 1329      Row Name 11/13/19 1225             Sit-Stand Transfer    Sit-Stand [place] (Transfers)  2 person assist;maximum assist (25% patient effort);dependent (less than 25% patient effort) attempted x 2  -      Assistive Device (Sit-Stand Transfers)  -- HHA-2  -JM      Recorded by [] Lisa Richey, hospitals 11/13/19 1329      Row Name 11/13/19 1225             Gait/Stairs Assessment/Training    [place] Level (Gait)  2 person assist;moderate assist (50% patient effort);maximum assist (25% patient effort)  -      Assistive Device (Gait)  walker, front-wheeled  -JM      Distance in Feet (Gait)  5  -JM      Deviations/Abnormal Patterns (Gait)  sudha decreased;festinating/shuffling;stride length decreased  -      Bilateral Gait Deviations  forward flexed posture;weight shift ability decreased  -JM      Comment (Gait/Stairs)  assist to guide rwx, cues to keep on task  -JM      Recorded by [] Lisa Richey, hospitals 11/13/19 1329      Row Name 11/13/19 1227             Therapeutic Exercise    Lower Extremity (Therapeutic Exercise)  LAQ (long arc quad), bilateral;marching while seated  -      Sets/Reps (Therapeutic Exercise)  10 reps  -      Recorded by [] Lisa Richey, hospitals 11/13/19 1329      Row Name 11/13/19 1225             Positioning and Restraints    Pre-Treatment Position  in bed  -JM      In Bed  fowlers;call light within reach;encouraged to call for assist;exit alarm on  -      Recorded by [JM] Lisa Richey, PTA 11/13/19 1329      Row Name 11/13/19 1225             Pain Scale: Word Pre/Post-Treatment    Pain: Word Scale, Pretreatment  0 - no pain  -      Pain: Word Scale, Post-Treatment  0 - no pain  -      Recorded by [] Lisa Richey, PTA 11/13/19 1329      Row Name                 Wound 11/09/19 1110 Right upper;medial coccyx Pressure Injury    Wound - Properties Group Date first assessed: 11/09/19 [LH] Time first assessed: 1110 [LH] Present on Hospital Admission: Y [LH] Side: Right [LH2] Orientation: upper;medial [LH] Location: coccyx [LH] Primary Wound Type: Pressure inj [LH] Stage, Pressure Injury: Stage 2 [LH] Recorded by:  [LH] Jody Lee RN 11/09/19 1207 [LH2] Jody Lee RN 11/09/19 1208      User Key  (r) = Recorded By, (t) = Taken By, (c) = Cosigned By    Initials Name Effective Dates Discipline     Jody Lee RN 06/16/16 -  Nurse    Lisa Becerra PTA 03/07/18 -  PT          Wound 11/09/19 1110 Right upper;medial coccyx Pressure Injury (Active)   Dressing Appearance dry;intact 11/13/2019  8:45 AM   Closure RICHY 11/13/2019  8:45 AM   Base dressing in place, unable to visualize 11/13/2019  8:45 AM           Physical Therapy Education     Title: PT OT SLP Therapies (Done)     Topic: Physical Therapy (Done)     Point: Mobility training (Done)     Learning Progress Summary           Patient Acceptance, E,D, VU,NR by KULWANT at 11/13/2019  1:34 PM    Acceptance, E,TB,D, DU,NR by ZUHAIR at 11/12/2019  4:56 PM    Acceptance, E,TB, VU by DASHA at 11/12/2019  1:57 AM    Acceptance, E,D, NR by KULWANT at 11/11/2019  3:02 PM    Acceptance, E,D, NR by TERRENCE at 11/10/2019  3:35 PM                   Point: Home exercise program (Done)     Learning Progress Summary           Patient Acceptance, E,D, VU,NR by KULWANT at 11/13/2019  1:34 PM    Acceptance, E,TB,D, DU,NR by ZUHAIR at 11/12/2019  4:56 PM    Acceptance, E,TB, VU by DASHA at 11/12/2019  1:57 AM    Acceptance, E,D, NR by KULWANT at 11/11/2019  3:02 PM    Acceptance, E,D, NR by TERRENCE at 11/10/2019  3:35 PM                   Point: Body mechanics (Done)     Learning Progress Summary           Patient Acceptance, E,D, VU,NR by KULWANT at 11/13/2019  1:34 PM    Acceptance, DINO PAUL D, DU,NR by PH at 11/12/2019  4:56 PM    Acceptance, DINO PAUL VU by DASHA at 11/12/2019   1:57 AM    Acceptance, E,D, NR by  at 11/11/2019  3:02 PM    Acceptance, E,D, NR by MW at 11/10/2019  3:35 PM                   Point: Precautions (Done)     Learning Progress Summary           Patient Acceptance, E,D, VU,NR by  at 11/13/2019  1:34 PM    Acceptance, E,TB,D, DU,NR by  at 11/12/2019  4:56 PM    Acceptance, E,TB, VU by  at 11/12/2019  1:57 AM    Acceptance, E,D, NR by JM at 11/11/2019  3:02 PM    Acceptance, E,D, NR by MW at 11/10/2019  3:35 PM                               User Key     Initials Effective Dates Name Provider Type Discipline     03/07/18 -  Lisa Richey PTA Physical Therapy Assistant PT    RW 06/16/16 -  Ede Russell, BRODERICK Registered Nurse Nurse     08/20/19 -  Erendira Rosen PTA Physical Therapy Assistant PT     09/17/19 -  Hayley Edmonds PT Physical Therapist PT                PT Recommendation and Plan     Plan of Care Reviewed With: patient  Progress: improving  Outcome Summary: able to amb short dist today, pt improved sitting balance and agreeable to PT, facility at [place]  Outcome Measures     Row Name 11/13/19 1300 11/11/19 1500          How much help from another person do you currently need...    Turning from your back to your side while in flat bed without using bedrails?  2  -JM  2  -JM     Moving from lying on back to sitting on the side of a flat bed without bedrails?  2  -JM  2  -JM     Moving to and from a bed to a chair (including a wheelchair)?  2  -JM  1  -JM     Standing up from a chair using your arms (e.g., wheelchair, bedside chair)?  2  -JM  2  -JM     Climbing 3-5 steps with a railing?  1  -JM  1  -JM     To walk in hospital room?  2  -JM  1  -JM     AM-PAC 6 Clicks Score (PT)  11  -  9  -JM       User Key  (r) = Recorded By, (t) = Taken By, (c) = Cosigned By    Initials Name Provider Type     Lisa Richey PTA Physical Therapy Assistant         Time Calculation:   PT Charges     Row Name 11/13/19 1320             Time Calculation     Start Time  1140  -      Stop Time  1200  -      Time Calculation (min)  20 min  -KULWANT      PT Received On  11/13/19  -KULWANT      PT - Next Appointment  11/14/19  -KULWANT        User Key  (r) = Recorded By, (t) = Taken By, (c) = Cosigned By    Initials Name Provider Type    Lisa Becerra PTA Physical Therapy Assistant        Therapy Charges for Today     Code Description Service Date Service Provider Modifiers Qty    13464217110 HC PT THER PROC EA 15 MIN 11/13/2019 Lisa Richey PTA GP 1    05384723110 HC PT THER SUPP EA 15 MIN 11/13/2019 Lisa Richey PTA GP 1          PT G-Codes  Outcome Measure Options: AM-PAC 6 Clicks Basic Mobility (PT)  AM-PAC 6 Clicks Score (PT): 11    Lisa Richey PTA  11/13/2019

## 2019-11-14 NOTE — PROGRESS NOTES
Continued Stay Note  [facility]     Patient Name: Maru Richey  MRN: [id number]  Today's Date: 11/14/2019    Admit Date: 11/9/2019    Discharge Plan     Row Name 11/14/19 1541       Plan    Plan  Plan [facility] for skilled care.   NIALL Shah RN    Patient/Family in Agreement with Plan  yes    Plan Comments  Daughter  (Lisa Hooks [phone]) called and stated she has decided to transport pt to [facility].   Per Nikki ( [phone]) [facility] has a bed and can accept pt .  Plan [facility] for skilled care transported per family.   NIALL Shah RN         Discharge Codes    No documentation.             Myra Shah, RN

## 2019-11-14 NOTE — PLAN OF CARE
Problem: Skin Injury Risk (Adult)  Goal: Skin Health and Integrity  Outcome: Ongoing (interventions implemented as appropriate)   11/14/19 0310   Skin Injury Risk (Adult)   Skin Health and Integrity making progress toward outcome       Problem: Fall Risk (Adult)  Goal: Absence of Fall  Outcome: Ongoing (interventions implemented as appropriate)   11/14/19 0310   Fall Risk (Adult)   Absence of Fall making progress toward outcome

## 2019-11-14 NOTE — PROGRESS NOTES
"DAILY PROGRESS NOTE  [facility]    Patient Identification:  Name: Maru Richey  Age: 89 y.o.  Sex: female  :  1925  MRN: [id number]         Primary Care Physician: Elicia Tobar MD    Subjective: patient is sleeping; not voiding well per staff; has had to be straight cathed; also with poor po intake  Interval History: follow up for retention of urine, weakness, dementia, underweight    Objective:    Scheduled Meds:  aspirin 81 mg Oral Daily   bisacodyl 10 mg Rectal Daily   pantoprazole 40 mg Oral BID AC   polyethylene glycol 17 g Oral BID   senna-docusate sodium 2 tablet Oral BID   sodium chloride 10 mL Intravenous Q12H     Continuous Infusions:  sodium chloride 50 mL/hr Last Rate: 50 mL/hr (19)       Vital signs in last 24 hours:  Temp:  [97.4 °F (36.3 °C)-98.1 °F (36.7 °C)] 98.1 °F (36.7 °C)  Heart Rate:  [86-99] 99  Resp:  [16-18] 18  BP: (107-147)/(52-75) 113/73    Intake/Output:    Intake/Output Summary (Last 24 hours) at 2019 1856  Last data filed at 2019 1844  Gross per 24 hour   Intake 930 ml   Output 575 ml   Net 355 ml       Exam:  /73 (BP Location: Left arm, Patient Position: Lying)   Pulse 99   Temp 98.1 °F (36.7 °C) (Oral)   Resp 18   Ht 172.7 cm (68\")   Wt 53.6 kg (118 lb 2.7 oz)   SpO2 96%   BMI 17.97 kg/m²     General Appearance:    Alert, cooperative, no distress, AAOx3                          Head:    Normocephalic, without obvious abnormality, atraumatic                           Eyes:    PERRL, conjunctiva/corneas clear, EOM's intact, both eyes                         Throat:   Lips, tongue, gums normal; oral mucosa pink and moist                           Neck:   Supple, symmetrical, trachea midline, no JVD                         Lungs:    Decreased breath sounds bilaterally, respirations unlabored                 Chest Wall:    No tenderness or deformity                          Heart:    Regular rate and rhythm, S1 and S2 " normal, no murmur,no  Rub                                      or gallop                  Abdomen:     Soft, non-tender, bowel sounds active, no masses, no                                                        organomegaly                  Extremities:   Extremities normal, atraumatic, no cyanosis or edema                        Pulses:   Pulses palpable in all extremities                            Skin:   Skin is warm and dry,  no rashes or palpable lesions                  Neurologic:   CNII-XII intact, motor strength grossly intact, sensation grossly                                         intact to light touch, no focal deficits noted       Data Review:  Labs in chart were reviewed.  Lab Results   Component Value Date    WBC 7.37 11/14/2019    HGB 9.4 (L) 11/14/2019    HCT 29.6 (L) 11/14/2019     11/14/2019     Lab Results   Component Value Date     11/14/2019    K 4.0 11/14/2019     11/14/2019    CO2 27.1 11/14/2019    BUN 23 11/14/2019    CREATININE 0.92 11/14/2019    GLUCOSE 101 (H) 11/14/2019     Lab Results   Component Value Date    CALCIUM 9.4 11/14/2019    MG 2.1 11/14/2019     No results found for: AST, ALT, ALKPHOS  Patient Active Problem List   Diagnosis Code   • Syncope R55   • Hypertension I10   • Hyperlipidemia E78.5   • History of peptic ulcer Z87.11   • Weight loss, abnormal R63.4   • Osteoarthritis of multiple joints M15.9   • Compression fracture of T10 vertebra (CMS/[facility]) S22.070A   • Dehydration E86.0   • Knee pain M25.569   • Slow transit constipation K59.01   • Iron deficiency E61.1   • Acute renal failure superimposed on stage 2 chronic kidney disease (CMS/HCC) N17.9, N18.2   • Acute urinary retention R33.8   • Acute kidney failure (CMS/HCC) N17.9   • Chest pain, atypical R07.89       Assessment:    Acute renal failure superimposed on stage 2 chronic kidney disease (CMS/HCC)    Hyperlipidemia    History of peptic ulcer    Osteoarthritis of multiple joints    Slow transit  constipation    Iron deficiency    Acute urinary retention    Acute kidney failure (CMS/HCC)    Chest pain, atypical  underweight    Plan:  Will ask urology to see her  Not voiding well and has had to be straight cathed with several hundred ml of retention  Rehab is planned  Also not taking much po  May need to consider palliative care input  David nurse  Medium risk    Pidead Brown MD  11/14/2019  6:56 PM

## 2019-11-14 NOTE — PROGRESS NOTES
Continued Stay Note  [facility]     Patient Name: Maru Richey  MRN: [id number]  Today's Date: 11/14/2019    Admit Date: 11/9/2019    Discharge Plan     Row Name 11/14/19 0944       Plan    Plan  Plan [facility] for skilled care.  NIALL Shah RN    Patient/Family in Agreement with Plan  yes    Plan Comments  Spoke to pt's daughter (Lisa Hooks [phone]) per phone.  Plan continues to be [facility] for skilled care.  Daughter states she prefers wheelchair van for transport and is agreeable to paying for transport.  Plan [facility] for skilled care.  NIALL Shah RN        Discharge Codes    No documentation.             Myra Shah, RN

## 2019-11-14 NOTE — PLAN OF CARE
Problem: Patient Care Overview  Goal: Plan of Care Review   11/14/19 4165   Coping/Psychosocial   Plan of Care Reviewed With patient   OTHER   Outcome Summary Patient required maxA for bed mobility and only agreeable to exercises in bed before wanting to lay back down. Will continue to advance as able.

## 2019-11-14 NOTE — THERAPY TREATMENT NOTE
Patient Name: Maru Richey  : 1925    MRN: [id number]                              Today's Date: 2019       Admit Date: 2019    Visit Dx:     ICD-10-CM ICD-9-CM   1. Constipation, unspecified constipation type K59.00 564.00   2. Generalized weakness R53.1 780.79   3. VENU (acute kidney injury) (CMS/HCC) N17.9 584.9   4. Urinary retention R33.9 788.20     Patient Active Problem List   Diagnosis   • Syncope   • Hypertension   • Hyperlipidemia   • History of peptic ulcer   • Weight loss, abnormal   • Osteoarthritis of multiple joints   • Compression fracture of T10 vertebra (CMS/HCC)   • Dehydration   • Knee pain   • Slow transit constipation   • Iron deficiency   • Acute renal failure superimposed on stage 2 chronic kidney disease (CMS/HCC)   • Acute urinary retention   • Acute kidney failure (CMS/HCC)   • Chest pain, atypical     Past Medical History:   Diagnosis Date   • Hyperlipidemia    • Hypertension    • Iron deficiency      Past Surgical History:   Procedure Laterality Date   • APPENDECTOMY     • HYSTERECTOMY       General Information     Row Name 19 1631          PT Evaluation Time/Intention    Document Type  therapy note (daily note)  -MS     Mode of Treatment  physical therapy  -MS     Row Name 19 1631          General Information    Existing Precautions/Restrictions  fall;oxygen therapy device and L/min  -MS     Row Name 19 1631          Cognitive Assessment/Intervention- PT/OT    Orientation Status (Cognition)  oriented to;person;situation  -MS       User Key  (r) = Recorded By, (t) = Taken By, (c) = Cosigned By    Initials Name Provider Type    MS Traesure Galloway, PT Physical Therapist        Mobility     Row Name 19 1631          Bed Mobility Assessment/Treatment    Supine-Sit [place] (Bed Mobility)  2 person assist;maximum assist (25% patient effort);verbal cues;nonverbal cues (demo/gesture)  -MS     Sit-Supine [place] (Bed Mobility)  2 person  assist;maximum assist (25% patient effort);dependent (less than 25% patient effort);verbal cues;nonverbal cues (demo/gesture)  -MS     Comment (Bed Mobility)  Agitated when sitting EOB, agreeable to exs only.  -MS     Row Name 11/14/19 1631          Transfer Assessment/Treatment    Comment (Transfers)  Patient declined.  -MS       User Key  (r) = Recorded By, (t) = Taken By, (c) = Cosigned By    Initials Name Provider Type    Treasure Hayward, PT Physical Therapist        Obj/Interventions     Row Name 11/14/19 1632          Therapeutic Exercise    Lower Extremity (Therapeutic Exercise)  LAQ (long arc quad), bilateral  -MS     Exercise Type (Therapeutic Exercise)  AROM (active range of motion)  -MS     Position (Therapeutic Exercise)  seated  -MS     Sets/Reps (Therapeutic Exercise)  10 reps  -MS       User Key  (r) = Recorded By, (t) = Taken By, (c) = Cosigned By    Initials Name Provider Type    Treasure Hayward, PT Physical Therapist        Goals/Plan    No documentation.       Clinical Impression     Row Name 11/14/19 1633          Pain Scale: Numbers Pre/Post-Treatment    Pain Scale: Numbers, Pretreatment  6/10  -MS     Pain Scale: Numbers, Post-Treatment  6/10  -MS     Pain Location - Orientation  generalized  -MS     Row Name 11/14/19 1633          Vital Signs    Pre SpO2 (%)  95  -MS     O2 Delivery Pre Treatment  room air  -MS       User Key  (r) = Recorded By, (t) = Taken By, (c) = Cosigned By    Initials Name Provider Type    Treasure Hayward, PT Physical Therapist        Outcome Measures     Row Name 11/14/19 1633          How much help from another person do you currently need...    Turning from your back to your side while in flat bed without using bedrails?  2  -MS     Moving from lying on back to sitting on the side of a flat bed without bedrails?  2  -MS     Moving to and from a bed to a chair (including a wheelchair)?  2  -MS     Standing up from a chair using your arms (e.g.,  wheelchair, bedside chair)?  2  -MS     Climbing 3-5 steps with a railing?  1  -MS     To walk in hospital room?  1  -MS     AM-PAC 6 Clicks Score (PT)  10  -MS       User Key  (r) = Recorded By, (t) = Taken By, (c) = Cosigned By    Initials Name Provider Type    Treasure Hayward MIGUEL, PT Physical Therapist        Physical Therapy Education     Title: PT OT SLP Therapies (Done)     Topic: Physical Therapy (Done)     Point: Mobility training (Done)     Learning Progress Summary           Patient Acceptance, E, VU,NR by MS at 11/14/2019  4:34 PM    Acceptance, E,TB, VU by DASHA at 11/13/2019  3:14 PM    Acceptance, E,D, VU,NR by KULWANT at 11/13/2019  1:34 PM    Acceptance, E,TB,D, DU,NR by ZUHAIR at 11/12/2019  4:56 PM    Acceptance, E,TB, VU by DASHA at 11/12/2019  1:57 AM    Acceptance, E,D, NR by KULWANT at 11/11/2019  3:02 PM    Acceptance, E,D, NR by TERRENCE at 11/10/2019  3:35 PM                   Point: Home exercise program (Done)     Learning Progress Summary           Patient Acceptance, E, VU,NR by MS at 11/14/2019  4:34 PM    Acceptance, E,TB, VU by DASHA at 11/13/2019  3:14 PM    Acceptance, E,D, VU,NR by KULWANT at 11/13/2019  1:34 PM    Acceptance, E,TB,D, DU,NR by  at 11/12/2019  4:56 PM    Acceptance, E,TB, VU by DASHA at 11/12/2019  1:57 AM    Acceptance, E,D, NR by KULWANT at 11/11/2019  3:02 PM    Acceptance, E,D, NR by TERRENCE at 11/10/2019  3:35 PM                   Point: Body mechanics (Done)     Learning Progress Summary           Patient Acceptance, E, VU,NR by MS at 11/14/2019  4:34 PM    Acceptance, E,TB, VU by DASHA at 11/13/2019  3:14 PM    Acceptance, E,D, VU,NR by KULWANT at 11/13/2019  1:34 PM    Acceptance, E,TB,D, DU,NR by  at 11/12/2019  4:56 PM    Acceptance, E,TB, VU by DASHA at 11/12/2019  1:57 AM    Acceptance, DUANE PAUL, NR by KULWANT at 11/11/2019  3:02 PM    Acceptance, DUANE PAUL NR by TERRENCE at 11/10/2019  3:35 PM                   Point: Precautions (Done)     Learning Progress Summary           Patient Acceptance, GRACIELA PAULNR by MS at 11/14/2019   4:34 PM    Acceptance, E,TB, VU by  at 11/13/2019  3:14 PM    Acceptance, E,D, VU,NR by  at 11/13/2019  1:34 PM    Acceptance, E,TB,D, DU,NR by  at 11/12/2019  4:56 PM    Acceptance, E,TB, VU by RW at 11/12/2019  1:57 AM    Acceptance, E,D, NR by  at 11/11/2019  3:02 PM    Acceptance, E,D, NR by  at 11/10/2019  3:35 PM                               User Key     Initials Effective Dates Name Provider Type Discipline     03/07/18 -  Lisa Richey, PTA Physical Therapy Assistant PT    RW 06/16/16 -  Ede Russell, RN Registered Nurse Nurse    MS 03/04/19 -  Treasure Galloway, PT Physical Therapist PT     08/20/19 -  Erendira Rosen PTA Physical Therapy Assistant PT     09/17/19 -  Hayley Edmonds, PT Physical Therapist PT              PT Recommendation and Plan     Plan of Care Reviewed With: patient  Outcome Summary: Patient required maxA for bed mobility and only agreeable to exercises in bed before wanting to lay back down. Will continue to advance as able.     Time Calculation:   PT Charges     Row Name 11/14/19 1631             Time Calculation    Start Time  1619  -MS      Stop Time  1628  -MS      Time Calculation (min)  9 min  -MS      PT Received On  11/14/19  -MS      PT - Next Appointment  11/15/19  -MS        User Key  (r) = Recorded By, (t) = Taken By, (c) = Cosigned By    Initials Name Provider Type    MS Galloway Treasure RIVERA, PT Physical Therapist        Therapy Charges for Today     Code Description Service Date Service Provider Modifiers Qty    68714005946 HC PT THER PROC EA 15 MIN 11/14/2019 Treasure Galloway, PT GP 1    70294622625 HC PT THER SUPP EA 15 MIN 11/14/2019 Treasure Galloway, PT GP 1          PT G-Codes  Outcome Measure Options: AM-PAC 6 Clicks Basic Mobility (PT)  AM-PAC 6 Clicks Score (PT): 10    Treasure Galloway, PT  11/14/2019

## 2019-11-14 NOTE — PLAN OF CARE
Problem: Patient Care Overview  Goal: Plan of Care Review  Outcome: Ongoing (interventions implemented as appropriate)   11/14/19 0307   Coping/Psychosocial   Plan of Care Reviewed With patient   Plan of Care Review   Progress improving   OTHER   Outcome Summary Pt disoriented to time. Q2 turned throuhgout the night. Possible d/c today to facility. C/o pain x1, treated with PRN medication. No s/s of distress at this time, VSS, will cont to monitor.        Problem: Constipation (Adult)  Goal: Effective Bowel Elimination  Outcome: Ongoing (interventions implemented as appropriate)   11/14/19 0307   Constipation (Adult)   Effective Bowel Elimination making progress toward outcome     Goal: Comfort  Outcome: Ongoing (interventions implemented as appropriate)   11/14/19 0307   Constipation (Adult)   Comfort making progress toward outcome       Problem: Wound (Includes Pressure Injury) (Adult)  Goal: Signs and Symptoms of Listed Potential Problems Will be Absent, Minimized or Managed (Wound)  Outcome: Ongoing (interventions implemented as appropriate)   11/14/19 0307   Goal/Outcome Evaluation   Problems Assessed (Wound) all   Problems Present (Wound) none

## 2019-11-15 NOTE — CONSULTS
[facility] CONSULT      Patient Identification:  NAME:  Maru Richey  Age:  89 y.o.   Sex:  female   :  1925   MRN:  [id number]       Chief complaint: Unable to urinate    History of present illness: Ms. Richey is a very pleasant 89-year-old female originally from [place] but up here with 1 of her children who lives in the local area.  He has baseline incontinence which sounds more like urgency incontinence.  She was wearing diapers at home and had good fecal control.  She is pretty ambulatory.  Since she is been here she is been largely bedridden having trouble voiding and had to be catheterized.  She did have some constipation but has had a good bowel movement yesterday and today.  She is never seen urology before.  She had a previous hysterectomy with no bladder repair.  No history of any stone disease.  No gross hematuria.  Her nurse bladder scan here while I was in the room and it came out to be about 795 cc.  She had a fecal soilage as well at the time there was very soft but not liquefied.      Past medical history:  Past Medical History:   Diagnosis Date   • Hyperlipidemia    • Hypertension    • Iron deficiency        Past surgical history:  Past Surgical History:   Procedure Laterality Date   • APPENDECTOMY     • HYSTERECTOMY         Allergies:  Patient has no known allergies.    Home medications:  Medications Prior to Admission   Medication Sig Dispense Refill Last Dose   • acetaminophen (TYLENOL) 500 MG tablet Take 2 tablets by mouth Every 8 (Eight) Hours As Needed for Mild Pain  or Moderate Pain . 60 tablet 0    • ALPHAGAN P 0.1 % solution ophthalmic solution    10/7/2019 at Unknown time   • Elastic Bandages & Supports (MEDICAL COMPRESSION SOCKS) misc Compression socks due to edema in legs and ankles 2 each 1 10/7/2019 at Unknown time   • ferrous gluconate (FERGON) 240 (27 FE) MG tablet Take 240 mg by mouth 2 (Two) Times a Day.   10/7/2019 at Unknown time   • fluticasone (FLONASE)  50 MCG/ACT nasal spray 1 spray into the nostril(s) as directed by provider Daily As Needed for Rhinitis or Allergies.   Past Week at Unknown time       Hospital medications:    aspirin 81 mg Oral Daily   bisacodyl 10 mg Rectal Daily   pantoprazole 40 mg Oral BID AC   polyethylene glycol 17 g Oral BID   senna-docusate sodium 2 tablet Oral BID   sodium chloride 10 mL Intravenous Q12H       sodium chloride 50 mL/hr Last Rate: 50 mL/hr (19)     •  acetaminophen **OR** acetaminophen **OR** acetaminophen  •  bisacodyl  •  bisacodyl  •  ondansetron  •  sodium chloride    Family history:  History reviewed. No pertinent family history.    Social history:  Social History     Tobacco Use   • Smoking status: Never Smoker   • Smokeless tobacco: Never Used   Substance Use Topics   • Alcohol use: No     Frequency: Never   • Drug use: No       Review of systems:    Negative 12-system ROS except for the following: Retention.  History of an urgent continence but none at this time.  No dysuria.  Some vaginal discomfort.      Objective:  TMax 24 hours:   Temp (24hrs), Av.1 °F (36.7 °C), Min:97.6 °F (36.4 °C), Max:98.6 °F (37 °C)      Vitals Ranges:   Temp:  [97.6 °F (36.4 °C)-98.6 °F (37 °C)] 97.6 °F (36.4 °C)  Heart Rate:  [86-97] 97  Resp:  [16-18] 16  BP: (106-149)/(49-80) 129/64    Intake/Output Last 3 shifts:  I/O last 3 completed shifts:  In: 930 [P.O.:930]  Out: 1225 [Urine:1225]     Physical Exam:       General Appearance:    Alert, cooperative, in no acute distress   Head:    Normocephalic, without obvious abnormality, atraumatic   Eyes:          PERRL, conjunctivae and corneas clear   Ears:    Normal external inspection   Throat:   No oral lesions, oral mucosa moist   Neck:   Supple, no LAD, trachea midline   Back:     No CVA tenderness   Lungs:     Respirations unlabored, symmetric excursion    Heart:    RRR, intact peripheral pulses   Abdomen:    Nondistended no bladder distention.   :   External genitalia  are normal.  Some fecal soilage at this point but no obvious prolapse.   Extremities:   No edema, no deformity   Skin:   No bleeding, bruising or rashes   Neuro/Psych:   Orientation intact, mood/affect pleasant, no focal findings       Results review:   I reviewed the patient's new clinical results.    Data review:  Lab Results (last 24 hours)     ** No results found for the last 24 hours. **           Imaging:  Imaging Results (Last 24 Hours)     ** No results found for the last 24 hours. 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    Plan:     Continue with intermittent catheterization for now.  Hopefully as she ambulates and gets her strength up her bladder control or return.  If she is contemplating discharge and unable to void then we could probably send her out with a Velazquez catheter if needed.  Ideally she would be going to rehab and they can do intermittent catheterization as well and see how her bladder evolves.    Rudolph Lewis MD  11/15/19  3:40 PM

## 2019-11-15 NOTE — DISCHARGE INSTR - LAB
Please make a follow up appointment with Dr. Lavell Lewis with [facility]  Office phone: [phone]

## 2019-11-15 NOTE — PLAN OF CARE
Problem: Skin Injury Risk (Adult)  Goal: Skin Health and Integrity  Outcome: Ongoing (interventions implemented as appropriate)   11/15/19 0334   Skin Injury Risk (Adult)   Skin Health and Integrity making progress toward outcome       Problem: Fall Risk (Adult)  Goal: Absence of Fall  Outcome: Ongoing (interventions implemented as appropriate)   11/15/19 0334   Fall Risk (Adult)   Absence of Fall making progress toward outcome       Problem: Patient Care Overview  Goal: Plan of Care Review  Outcome: Ongoing (interventions implemented as appropriate)   11/15/19 0334   Coping/Psychosocial   Plan of Care Reviewed With patient   Plan of Care Review   Progress improving   OTHER   Outcome Summary Pt disoriented to time and situation. Q2 turns. Medicated per orders. C/o pain, treated with PRN pain meds. Straight cath x1 , BM x1 this shift.  No s/s of distress at this time, VSS, will cont to monitor.        Problem: Constipation (Adult)  Goal: Effective Bowel Elimination  Outcome: Ongoing (interventions implemented as appropriate)   11/15/19 0334   Constipation (Adult)   Effective Bowel Elimination making progress toward outcome     Goal: Comfort  Outcome: Ongoing (interventions implemented as appropriate)   11/15/19 0334   Constipation (Adult)   Comfort making progress toward outcome       Problem: Wound (Includes Pressure Injury) (Adult)  Goal: Signs and Symptoms of Listed Potential Problems Will be Absent, Minimized or Managed (Wound)  Outcome: Ongoing (interventions implemented as appropriate)   11/15/19 0334   Goal/Outcome Evaluation   Problems Assessed (Wound) all   Problems Present (Wound) none

## 2019-11-15 NOTE — THERAPY TREATMENT NOTE
Acute Care - Physical Therapy Treatment Note  [facility]     Patient Name: Maru Richey  : 1925  MRN: [id number]  Today's Date: 11/15/2019             Admit Date: 2019    Visit Dx:    ICD-10-CM ICD-9-CM   1. Constipation, unspecified constipation type K59.00 564.00   2. Generalized weakness R53.1 780.79   3. VENU (acute kidney injury) (CMS/HCC) N17.9 584.9   4. Urinary retention R33.9 788.20     Patient Active Problem List   Diagnosis   • Syncope   • Hypertension   • Hyperlipidemia   • History of peptic ulcer   • Weight loss, abnormal   • Osteoarthritis of multiple joints   • Compression fracture of T10 vertebra (CMS/HCC)   • Dehydration   • Knee pain   • Slow transit constipation   • Iron deficiency   • Acute renal failure superimposed on stage 2 chronic kidney disease (CMS/HCC)   • Acute urinary retention   • Acute kidney failure (CMS/HCC)   • Chest pain, atypical       Therapy Treatment    Rehabilitation Treatment Summary     Row Name 11/15/19 1619             Treatment Time/Intention    Discipline  physical therapy assistant  -      Document Type  therapy note (daily note)  -      Subjective Information  complains of;weakness;fatigue  -      Mode of Treatment  individual therapy;physical therapy  -      Care Plan Review  patient/other agree to care plan  -      Comment  pt agreed to exer only-she states she has been bothered all day  -      Existing Precautions/Restrictions  fall  -      Recorded by [] Lisa Richey, [facility] 11/15/19 162      Row Name 11/15/19 161             Bed Mobility Assessment/Treatment    Travis Level (Bed Mobility)  unable to assess  -      Recorded by [JM] Lisa Richey [facility] 11/15/19 162      Row Name 11/15/19 161             Sit-Stand Transfer    Sit-Stand Travis (Transfers)  unable to assess  -      Recorded by [] Lisa Richey, [facility] 11/15/19 162      Row Name 11/15/19 161             Gait/Stairs Assessment/Training     [place] Level (Gait)  unable to assess  -      Recorded by [JM] Lisa Richey, PTA 11/15/19 1622      Row Name 11/15/19 1619             Therapeutic Exercise    Lower Extremity (Therapeutic Exercise)  heel slides, bilateral  -      Lower Extremity Range of Motion (Therapeutic Exercise)  ankle dorsiflexion/plantar flexion, bilateral;hip abduction/adduction, bilateral  -      Sets/Reps (Therapeutic Exercise)  10 reps  -JM      Comment (Therapeutic Exercise)  assist w/exer  -JM      Recorded by [JM] Lisa Richey PTA 11/15/19 1622      Row Name 11/15/19 1619             Positioning and Restraints    Pre-Treatment Position  in bed  -JM      In Bed  supine;call light within reach;encouraged to call for assist;exit alarm on;notified nsg  -      Recorded by [] Lisa Richey PTA 11/15/19 1622      Row Name 11/15/19 1619             Pain Scale: Numbers Pre/Post-Treatment    Pain Scale: Numbers, Pretreatment  0/10 - no pain  -      Pain Scale: Numbers, Post-Treatment  0/10 - no pain  -      Recorded by [JM] Lisa Richey, [facility] 11/15/19 1622      Row Name                Wound 11/09/19 1110 Right upper;medial coccyx Pressure Injury    Wound - Properties Group Date first assessed: 11/09/19 [LH] Time first assessed: 1110 [] Present on Hospital Admission: Y [LH] Side: Right [LH2] Orientation: upper;medial [LH] Location: coccyx [] Primary Wound Type: Pressure inj [] Stage, Pressure Injury: Stage 2 [] Recorded by:  [] Jody Lee RN 11/09/19 1207 [LH2] Jody Lee RN 11/09/19 1208      User Key  (r) = Recorded By, (t) = Taken By, (c) = Cosigned By    Initials Name Effective Dates Discipline     Jody Lee RN 06/16/16 -  Nurse    Lisa Becerra, [facility] 03/07/18 -  PT          Wound 11/09/19 1110 Right upper;medial coccyx Pressure Injury (Active)   Dressing Appearance dry;intact 11/15/2019  2:00 PM   Closure RICHY 11/15/2019 12:15 AM   Base red;slough 11/15/2019  2:00 PM    Drainage Amount none 11/14/2019  5:00 PM   Care, Wound cleansed with;soap and water;dressing removed;barrier applied 11/15/2019  2:00 PM   Dressing Care, Wound dressing changed 11/14/2019  5:00 PM           Physical Therapy Education     Title: PT OT SLP Therapies (Done)     Topic: Physical Therapy (Done)     Point: Mobility training (Done)     Learning Progress Summary           Patient Acceptance, E,TB,D, VU,NR by KULWANT at 11/15/2019  4:23 PM    Acceptance, E, VU,NR by MS at 11/14/2019  4:34 PM    Acceptance, E,TB, VU by DASHA at 11/13/2019  3:14 PM    Acceptance, E,D, VU,NR by KULWANT at 11/13/2019  1:34 PM    Acceptance, E,TB,D, DU,NR by ZUHAIR at 11/12/2019  4:56 PM    Acceptance, E,TB, VU by DASHA at 11/12/2019  1:57 AM    Acceptance, E,D, NR by KULWANT at 11/11/2019  3:02 PM    Acceptance, E,D, NR by TERRENCE at 11/10/2019  3:35 PM                   Point: Home exercise program (Done)     Learning Progress Summary           Patient Acceptance, E,TB,D, VU,NR by KULWANT at 11/15/2019  4:23 PM    Acceptance, E, VU,NR by MS at 11/14/2019  4:34 PM    Acceptance, E,TB, VU by DASHA at 11/13/2019  3:14 PM    Acceptance, E,D, VU,NR by KULWANT at 11/13/2019  1:34 PM    Acceptance, E,TB,D, DU,NR by ZUHAIR at 11/12/2019  4:56 PM    Acceptance, E,TB, VU by DASHA at 11/12/2019  1:57 AM    Acceptance, E,D, NR by KULWANT at 11/11/2019  3:02 PM    Acceptance, E,D, NR by TERRENCE at 11/10/2019  3:35 PM                   Point: Body mechanics (Done)     Learning Progress Summary           Patient Acceptance, E,TB,D, VU,NR by KULWANT at 11/15/2019  4:23 PM    Acceptance, E, VU,NR by MS at 11/14/2019  4:34 PM    Acceptance, E,TB, VU by DASHA at 11/13/2019  3:14 PM    Acceptance, E,D, VU,NR by KULWANT at 11/13/2019  1:34 PM    Acceptance, DINO PAUL D, RODRIGO,NR by ZUHAIR at 11/12/2019  4:56 PM    Acceptance, DINO PAUL, VU by DASHA at 11/12/2019  1:57 AM    Acceptance, DUANE PAUL, NR by KULWANT at 11/11/2019  3:02 PM    Acceptance, DUANE PAUL, NR by TERRENCE at 11/10/2019  3:35 PM                   Point: Precautions (Done)     Learning Progress  Summary           Patient Acceptance, E,TB,D, VU,NR by  at 11/15/2019  4:23 PM    Acceptance, E, VU,NR by MS at 11/14/2019  4:34 PM    Acceptance, E,TB, VU by RW at 11/13/2019  3:14 PM    Acceptance, E,D, VU,NR by JM at 11/13/2019  1:34 PM    Acceptance, E,TB,D, DU,NR by  at 11/12/2019  4:56 PM    Acceptance, E,TB, VU by RW at 11/12/2019  1:57 AM    Acceptance, E,D, NR by  at 11/11/2019  3:02 PM    Acceptance, E,D, NR by  at 11/10/2019  3:35 PM                               User Key     Initials Effective Dates Name Provider Type Discipline     03/07/18 -  Lisa Richey, PTA Physical Therapy Assistant PT     06/16/16 -  Ede Russell, RN Registered Nurse Nurse    MS 03/04/19 -  Treasure Galloway, PT Physical Therapist PT     08/20/19 -  Erendira Rosen PTA Physical Therapy Assistant PT     09/17/19 -  Hayley Edmonds, PT Physical Therapist PT                PT Recommendation and Plan     Plan of Care Reviewed With: patient  Progress: improving  Outcome Summary: agreed to exer only, states being worn out from people in and out of room all day; KRUPA gonzalez LEs brinda  Outcome Measures     Row Name 11/15/19 1600 11/13/19 1300          How much help from another person do you currently need...    Turning from your back to your side while in flat bed without using bedrails?  1  -JM  2  -JM     Moving from lying on back to sitting on the side of a flat bed without bedrails?  1  -JM  2  -JM     Moving to and from a bed to a chair (including a wheelchair)?  1  -JM  2  -JM     Standing up from a chair using your arms (e.g., wheelchair, bedside chair)?  1  -JM  2  -JM     Climbing 3-5 steps with a railing?  1  -JM  1  -JM     To walk in hospital room?  1  -  2  -JM     AM-PAC 6 Clicks Score (PT)  6  -  11  -       User Key  (r) = Recorded By, (t) = Taken By, (c) = Cosigned By    Initials Name Provider Type    Lisa Becerra, [facility] Physical Therapy Assistant         Time Calculation:   PT Charges      Row Name 11/15/19 1618             Time Calculation    Start Time  1600  -      Stop Time  1614  -KULWANT      Time Calculation (min)  14 min  -KULWANT      PT Received On  11/15/19  -KULWANT      PT - Next Appointment  11/16/19  -KULWANT        User Key  (r) = Recorded By, (t) = Taken By, (c) = Cosigned By    Initials Name Provider Type    Lisa Bceerra PTA Physical Therapy Assistant        Therapy Charges for Today     Code Description Service Date Service Provider Modifiers Qty    [id number] HC PT THER PROC EA 15 MIN 11/15/2019 Lisa Richey PTA GP 1          PT G-Codes  Outcome Measure Options: AM-PAC 6 Clicks Basic Mobility (PT)  AM-PAC 6 Clicks Score (PT): 6    Lisa Richey PTA  11/15/2019

## 2019-11-15 NOTE — DISCHARGE SUMMARY
PHYSICIAN DISCHARGE SUMMARY                                                                        [facility]    Patient Identification:  Name: Maru Richey  Age: 89 y.o.  Sex: female  :  1925  MRN: [id number]  Primary Care Physician: Elicia Tobar MD    Admit date: 2019  Discharge date and time:11/15/19    Discharged Condition: good    Discharge Diagnoses:  Acute renal failure superimposed on stage 2 chronic kidney disease (CMS/HCC)    Hyperlipidemia    History of peptic ulcer    Osteoarthritis of multiple joints    Slow transit constipation    Iron deficiency    Acute urinary retention    Acute kidney failure (CMS/HCC)    Chest pain, atypical   underweight  dehydration  Patient Active Problem List   Diagnosis Code   • Syncope R55   • Hypertension I10   • Hyperlipidemia E78.5   • History of peptic ulcer Z87.11   • Weight loss, abnormal R63.4   • Osteoarthritis of multiple joints M15.9   • Compression fracture of T10 vertebra (CMS/HCC) S22.070A   • Dehydration E86.0   • Knee pain M25.569   • Slow transit constipation K59.01   • Iron deficiency E61.1   • Acute renal failure superimposed on stage 2 chronic kidney disease (CMS/HCC) N17.9, N18.2   • Acute urinary retention R33.8   • Acute kidney failure (CMS/HCC) N17.9   • Chest pain, atypical R07.89       PMHX:   Past Medical History:   Diagnosis Date   • Hyperlipidemia    • Hypertension    • Iron deficiency      PSHX:   Past Surgical History:   Procedure Laterality Date   • APPENDECTOMY     • HYSTERECTOMY         Hospital Course: Maru Richey  Was admitted with weakness and acute renal failure; ct abdomen was done and showed a large fecal burden; her creatinine was also above baseline consistent with acute kidney injury likely due to poor po intake; she was started on fluids; intermittent caths were done due to retention of urine; urology was consulted and recommended  to continue with this; she will be going to subacute rehab for strengthening; po intake remains marginal; creatinine has returned to baseline with hydration    Consults:     Consults     Date and Time Order Name Status Description    11/14/2019 1828 Inpatient Urology Consult Completed     11/9/2019 0543 LHMIGUEL (on-call MD unless specified) Details Completed         Results from last 7 days   Lab Units 11/14/19  0625   WBC 10*3/mm3 7.37   HEMOGLOBIN g/dL 9.4*   HEMATOCRIT % 29.6*   PLATELETS 10*3/mm3 191     Results from last 7 days   Lab Units 11/14/19  0625   SODIUM mmol/L 137   POTASSIUM mmol/L 4.0   CHLORIDE mmol/L 105   CO2 mmol/L 27.1   BUN mg/dL 23   CREATININE mg/dL 0.92   GLUCOSE mg/dL 101*   CALCIUM mg/dL 9.4     Significant Diagnostic Studies:   Labs in chart were reviewed.  Lab Results   Component Value Date    WBC 7.37 11/14/2019    HGB 9.4 (L) 11/14/2019    HCT 29.6 (L) 11/14/2019     11/14/2019     Lab Results   Component Value Date     11/14/2019    K 4.0 11/14/2019     11/14/2019    CO2 27.1 11/14/2019    BUN 23 11/14/2019    CREATININE 0.92 11/14/2019    GLUCOSE 101 (H) 11/14/2019     Lab Results   Component Value Date    CALCIUM 9.4 11/14/2019    MG 2.1 11/14/2019     No results found for: AST, ALT, ALKPHOS  Imaging Results (All)     Procedure Component Value Units Date/Time    XR Abdomen KUB [[id number]] Collected:  11/11/19 1143     Updated:  11/11/19 1149    Narrative:       Abdominal radiographs     HISTORY:Assess stool burden     TECHNIQUE:  2 AP supine radiographs of the abdomen     COMPARISON:CT abdomen and pelvis 11/09/2019       Impression:       FINDINGS AND IMPRESSION:  A tube overlies the rectum. Scoliosis is incompletely visualized lumbar  spine.     There is a large amount of stool within the visualized colon, as before.  There also appears to be mild dilation of small bowel loops measuring up  to 4.6 cm in diameter which appears mildly progressed since  11/09/2019.  Findings may represent ileus versus obstruction and correlation with  patient history is recommended. Findings can be further evaluated with  small bowel follow-through if clinically indicated.     This report was finalized on 11/11/2019 11:46 AM by Dr. Frankie Hidalgo M.D.       CT Abdomen Pelvis Without Contrast [[id number]] Collected:  11/09/19 0105     Updated:  11/09/19 0118    Narrative:       CT OF THE ABDOMEN AND PELVIS WITHOUT CONTRAST     HISTORY: Lower abdominal pain and constipation     COMPARISON: None available.     TECHNIQUE: Axial CT imaging was obtained from the dome the diaphragm to  the symphysis pubis. No IV contrast was administered.     FINDINGS:  Images through the lung bases demonstrate scarring and atelectasis.  There are coronary artery calcifications. Distal esophagus appears  somewhat patulous and fluid-filled. Correlation with any symptoms of  esophagitis is recommended. The patient has a large duodenal  diverticulum. The adrenal glands are bulky in appearance. Spleen is  unremarkable. The pancreas is mildly atrophic. The gallbladder appears  unremarkable. There is atherosclerotic involvement of the abdominal  aorta. The patient has dilatation of the renal collecting systems  bilaterally. This favored to be secondary to urinary retention, as there  is marked distention of the urinary bladder. The urinary bladder  measures up to 12.3 cm in craniocaudal dimensions. There are 2 apparent  cystic structure seen within the pelvis, which I think may reflect  adnexal cysts. The larger is seen on the right, and measures up to 2.6 x  2.1 cm. Area on the left measures up to about 2.3 x 1.3 cm. These would  be better assessed with dedicated pelvic ultrasound, as deemed  clinically appropriate in this 89-year-old patient. The patient has a  massive volume of fecal material seen throughout the colon. There is  colonic diverticulosis. There is no evidence of diverticulitis. The  cecum is  "the most dilated segment. This measures up to 9.7 cm. No  pneumatosis or free air is seen. Again, there is no evidence of  mechanical small bowel obstruction. The patient is osteoporotic. No  acute osseous abnormalities are seen.       Impression:          1. The patient has a massive volume of fecal material throughout the  colon, characteristic of severe constipation/obstipation. There is no  evidence of mechanical small bowel obstruction.  2. The patient has bilateral hydronephrosis. This is favored to be  secondary to urinary retention, as there is distention of the urinary  bladder. Patient may benefit from catheterization.  3. Distal esophagus appears patulous and fluid-filled. Correlation with  any evidence of esophagitis is suggested.     Radiation dose reduction techniques were utilized, including automated  exposure control and exposure modulation based on body size.     This report was finalized on 11/9/2019 1:15 AM by Dr. Kellie Dwyer M.D.           /64 (BP Location: Left arm, Patient Position: Lying)   Pulse 97   Temp 97.6 °F (36.4 °C) (Oral)   Resp 16   Ht 172.7 cm (68\")   Wt 53.6 kg (118 lb 2.7 oz)   SpO2 99%   BMI 17.97 kg/m²     Discharge Exam:  General Appearance:    drowsy cooperative, no distress,  Ox2; chronically ill-appearing, frail, thin                          Head:    Normocephalic, without obvious abnormality, atraumatic; bitemporal wasting                          Eyes:    PERRL, conjunctiva/corneas clear, EOM's intact, both eyes                        Throat:   Lips, tongue, gums normal; oral mucosa pink and moist                          Neck:   Supple, symmetrical, trachea midline, no JVD                        Lungs:     Decreased breath sounds bilaterally, respirations unlabored                Chest Wall:    No tenderness or deformity                        Heart:    Regular rate and rhythm, S1 and S2 normal, no murmur,no  Rub                                       " or gallop                  Abdomen:     Soft, non-tender, bowel sounds active, no masses, no                                                        organomegaly                  Extremities:   Extremities normal, atraumatic, no cyanosis or edema, pulses                                           palpable in all extremities                             Skin:   Skin is warm and dry,  no rashes or palpable lesions                  Neurologic:   CNII-XII intact, motor strength grossly intact, sensation grossly                                           intact to light touch, no focal deficits noted     Disposition:  Skilled nursing facility    Patient Instructions:      Discharge Medications      New Medications      Instructions Start Date   ondansetron 4 MG tablet  Commonly known as:  ZOFRAN   4 mg, Oral, Every 6 Hours PRN      pantoprazole 40 MG EC tablet  Commonly known as:  PROTONIX   40 mg, Oral, 2 Times Daily Before Meals         Continue These Medications      Instructions Start Date   acetaminophen 500 MG tablet  Commonly known as:  TYLENOL   1,000 mg, Oral, Every 8 Hours PRN      ALPHAGAN P 0.1 % solution ophthalmic solution  Generic drug:  brimonidine   No dose, route, or frequency recorded.      ferrous gluconate 240 (27 FE) MG tablet  Commonly known as:  FERGON   240 mg, Oral, 2 Times Daily      fluticasone 50 MCG/ACT nasal spray  Commonly known as:  FLONASE   1 spray, Nasal, Daily PRN      Medical Compression Socks misc   Compression socks due to edema in legs and ankles             No future appointments.  Additional Instructions for the Follow-ups that You Need to Schedule     Please make a follow up appointment with Dr. Lavell Lweis with [facility]  Office phone: [phone]             Follow-up Information     Elicia Tobar MD .    Specialty:  Family Medicine  Contact information:  [address] [id number] [phone]                 Discharge Order (From admission, onward)     Start     Ordered    11/15/19 1702  Discharge patient  Once     Expected Discharge Date:  11/15/19    Discharge Disposition:  Home-Health Care [facility]    Physician of Record for Attribution - Please select from Treatment Team:  NICOLE CRABTREE [[id number]]    Review needed by CMO to determine Physician of Record:  No       Question Answer Comment   Physician of Record for Attribution - Please select from Treatment Team NICOLE CRABTREE    Review needed by CMO to determine Physician of Record No        11/15/19 1702          Total time spent discharging patient including evaluation,post hospitalization follow up,  medication and post hospitalization instructions and education total time exceeds 30 minutes.    Signed:  Piedad Brown MD  11/15/2019  6:24 PM

## 2019-11-15 NOTE — PLAN OF CARE
Problem: Patient Care Overview  Goal: Plan of Care Review  Outcome: Ongoing (interventions implemented as appropriate)   11/15/19 3301   Coping/Psychosocial   Plan of Care Reviewed With patient   OTHER   Outcome Summary agreed to exer only, states being worn out from people in and out of room all day; AAROM bilat LEs brinda

## 2019-11-15 NOTE — NURSING NOTE
Called report to nurse Codi at \[facility]\"" ([phone]). She requested to leave contact information for the urologist that saw the patient in the hospital. Note is in AVS. Spoke with Dr. Brown, orders to continue to bladder scan q4-6hrs and straight cath >350 per Dr. Lewis's (urology) orders. Codi at [facility] was notified of these orders. Notified daughter Lisa at [phone] that her mother was being discharged. Lisa is transporting Ms. Richey to [facility] and will arrive at [facility] around 1900. Discharge summary faxed [phone] per Codi request.

## 2019-11-16 NOTE — NURSING NOTE
Spoke with daughter on the phone when patient was discharged. I offered the daughter the wheelchair van service and she stated that she would transport the patient. Once daughter called stating she was arriving at patient discharge, transport took Ms. Richey to patient discharge and needed a nurse to help get Ms. Richey in. The daughter called upset stating she wanted Ms. Richey to ride in an ambulance. I told her that I would come down to get her. Niels, charge nurse and I quickly went to patient discharge to get the patient and both the patient and the daughter were gone. I called [facility] to make sure they made it ok and they are both at [facility] inside the building.

## 2019-11-16 NOTE — OUTREACH NOTE
Prep Survey      Responses   Facility patient discharged from?  [facility]   Is patient eligible?  No   What are the reasons patient is not eligible?  [facility] [[facility]]   Discharge diagnosis  Acute renal failure superimposed on stage 2 chronic kidney disease    Does the patient have one of the following disease processes/diagnoses(primary or secondary)?  Other   Prep survey completed?  Yes          Agata Kang RN

## 2019-11-18 NOTE — PROGRESS NOTES
Case Management Discharge Note    Final Note: Pt discharged to [facility] on 11/15.   NIALL Shah RN    Destination - Selection Complete      Service Provider Request Status Selected Services Address Phone Number Fax Number    [facility] Selected Skilled Nursing [address] [id number] [phone]      Durable Medical Equipment      No service has been selected for the patient.      Dialysis/Infusion      No service has been selected for the patient.      Home Medical Care      No service has been selected for the patient.      Therapy      No service has been selected for the patient.      Community Resources      No service has been selected for the patient.        Transportation Services  Private: Car    Final Discharge Disposition Code: 03 - skilled nursing facility (SNF)

## 2019-12-02 PROBLEM — N39.0 COMPLICATED UTI (URINARY TRACT INFECTION): Status: ACTIVE | Noted: 2019-01-01

## 2019-12-02 NOTE — ED NOTES
"EMS states \"nursing home staff called [phone] because the patient was lethargic. No one could tell us for how long. Pt had urine sample yesterday and I assume she had a UTI because she was started on Keflex.\" EMS does not have any additional information about patient complaint.     Chasidy Mcleod, RN  12/02/19 8775    "

## 2019-12-02 NOTE — ED PROVIDER NOTES
The BLESSING and I have discussed this patient's history, physical exam, and treatment plan. I have reviewed the documentation and personally had a face to face interaction with the patient  I affirm the documentation and agree with the treatment and plan.  The following describes my personal findings.    The patient presents to the ED from [facility] via EMS with complaint of altered mental status that was noticed by NH staff earlier this morning. The NH staff also report they noticed the patient's speech was slurred. The patient was started on Keflex yesterday to treat a urinary tract infection.    Limited physical exam:  Patient is nontoxic appearing  Lungs/cardiovascular: cardio is RRR, no obvious murmur, lungs are CTAB  Abdomen: mild suprapubic discomfort  Back/extremities: she is awake, she moves extremities x 4, DP pulses intact bilaterally    EKG          EKG time: 0845  Rhythm/Rate: sinus tachy 100s  P waves and AL: normal Ps, normal Miller  QRS, axis: unremarkable  ST and T waves: nonspecific ST/T wave findings     Interpreted Contemporaneously by me, independently viewed  Minimal change compared to prior 11/11/19    Workup reviewed. Lactic Acid is 2.6. WBC is 8.43. Procalcitonin is 0.60. Creatinine is 1.69. BUN is 46. Sodium is 149. UA shows TNTC WBC. CXR and CT Head are negative acute. Plan is to admit the patient for further evaluation and management.    Admit for encephalopathy, sepsis,  UTI    Documentation assistance provided by hasmukh Vasquez.  Information recorded by the scribe was done at my direction and has been verified and validated by me.       Tracy Vasquez  12/02/19 7778       Mariana Wilkes MD  12/02/19 4431

## 2019-12-02 NOTE — CONSULTS
[facility]  Cardiology Consult Note    Patient Identification:  Name: Maru Richey  Age: 89 y.o.  Sex: female  :  1925  MRN: [id number]             Requesting Physician: Dr. JENNIFER Douglass  Reason for Consultation / Chief Complaint: Elevated troponin    History of Present Illness:   Maru Richey is a 89-year-old female, who is new to our service.  She has a history of hyperlipidemia, hypertension, and iron deficiency. She resides at [facility] and she came to [facility] via EMS.  He was noticed by her facility that she was lethargic and her speech slurred this morning after awakening.  She is currently being treated with an antibiotic for UTI.  Her diagnosis on admit complicated UTI, altered mental status, lactic acidosis, and dehydration.  Arrival to EMS blood pressure 182/106 with a heart rate of 115.  Temperature 97.4.  Cultures pending.  EKG shows sinus tach, similar to previous EKG.  CXR showed no evidence of acute disease within the chest.  CT of the head showed no evidence of acute intracranial pathology.  Labs showed troponin 0 0.024, glucose 144, sodium 149, potassium 4.5, creatinine 1.69, ALT 17, AST 44, GFR 34, lactate 2.6, pro Cassis zuleyka 0.60, WBC 8.43, hemoglobin 11.6, and platelets 316..    Echo on 2019 showed EF 61%, mild LVH, moderate asymmetry symmetric septal hypertrophy; no evidence of obstruction, LV diastolic dysfunction is grade 1 AW/high LAP.  LAC size is mildly dilated, saline test results are negative, moderate calcification of the AV, full report as below..      Comorbid cardiac risk factors: hyperlipidemia, and hypertension    Past Medical History:  Past Medical History:   Diagnosis Date   • Hyperlipidemia    • Hypertension    • Iron deficiency      Past Surgical History:  Past Surgical History:   Procedure Laterality Date   • APPENDECTOMY     • HYSTERECTOMY        Allergies:  No Known Allergies  Home Meds:  Medications Prior to Admission    Medication Sig Dispense Refill Last Dose   • Multiple Vitamins-Minerals (MULTIVITAMIN ADULT PO) Take 1 tablet by mouth Daily.      • acetaminophen (TYLENOL) 500 MG tablet Take 2 tablets by mouth Every 8 (Eight) Hours As Needed for Mild Pain  or Moderate Pain . 60 tablet 0    • ALPHAGAN P 0.1 % solution ophthalmic solution 1 drop 2 (Two) Times a Day.   10/7/2019 at Unknown time   • Elastic Bandages & Supports (MEDICAL COMPRESSION SOCKS) misc Compression socks due to edema in legs and ankles 2 each 1 10/7/2019 at Unknown time   • ferrous gluconate (FERGON) 240 (27 FE) MG tablet Take 240 mg by mouth 2 (Two) Times a Day.   10/7/2019 at Unknown time   • fluticasone (FLONASE) 50 MCG/ACT nasal spray 1 spray into the nostril(s) as directed by provider Daily As Needed for Rhinitis or Allergies.   Past Week at Unknown time   • ondansetron (ZOFRAN) 4 MG tablet Take 1 tablet by mouth Every 6 (Six) Hours As Needed for Nausea or Vomiting. 30 tablet 0      Current Meds:      amLODIPine (NORVASC) tablet 5 mg 5 mg, PO, Q24H Ordered   aspirin chewable tablet 81 mg 81 mg, PO, Daily Ordered   atorvastatin (LIPITOR) tablet 40 mg 40 mg, PO, Nightly Ordered   brimonidine (ALPHAGAN P) 0.1 % ophthalmic solution 1 drop 1 drop, Both Eyes, BID Ordered   cefTRIAXone (ROCEPHIN) IVPB 1 g 1 g, IV, Q24H Ordered   isosorbide dinitrate (ISORDIL) tablet 10 mg 10 mg, PO, TID - Nitrates Ordered   metoprolol tartrate (LOPRESSOR) tablet 12.5 mg 12.5 mg, PO, Q12H Ordered   multivitamin with minerals 1 tablet 1 tablet, PO, Daily Ordered   pantoprazole (PROTONIX) EC tablet 40 mg 40 mg, PO, Q AM Ordered      Continuous     Medication Dose/Rate, Route, Frequency Last Action   sodium chloride 0.45 % infusion 125 mL/hr, IV, Continuous Ordered      PRN     Medication Dose/Rate, Route, Frequency Last Action   fluticasone (FLONASE) 50 MCG/ACT nasal spray 1 spray 1 spray, NA, Daily PRN Ordered   ondansetron (ZOFRAN) tablet 4 mg 4 mg, PO, Q6H PRN Ordered     "      Social History:   Social History     Tobacco Use   • Smoking status: Never Smoker   • Smokeless tobacco: Never Used   Substance Use Topics   • Alcohol use: No     Frequency: Never      Family History:  No family history on file.     Review of Systems  Constitutional: No wt loss, fever   Gastrointestinal: No nausea , abdominal pain  Behavioral/Psych: No insomnia or anxiety   Cardiovascular ----positive for sob. All other systems reviewed and are negative                 Physical Exam  /92 (BP Location: Left arm, Patient Position: Sitting)   Pulse 115   Temp 96.7 °F (35.9 °C) (Oral)   Resp 20   Ht 162.6 cm (64\")   Wt 49.1 kg (108 lb 3.2 oz)   SpO2 100%   BMI 18.57 kg/m²     General appearance: No acute changes   Eyes: Sclera conjunctiva normal, pupils reactive   HENT: Atraumatic; oropharynx clear with moist mucous membranes and no mucosal ulcerations;  Neck: Trachea midline; NECK, supple, no thyromegaly or lymphadenopathy   Lungs: Normal size and shape, normal breath sounds, equal distribution of air, no rales and rhonchi   CV: S1-S2 regular, no murmurs, no rub, no gallop   Abdomen: Soft, non-tender; no masses , no abnormal abdominal sounds   Extremities: No deformity , normal color , no peripheral edema   Skin: Normal temperature, turgor and texture; no rash, ulcers  Psych: Appropriate affect, alert and oriented to person, place and time                 Cardiographics  EC/2/19 sinus tachycardia      19 sinus rhythm      Telemetry: unavailable    Echocardiogram:   19  Interpretation Summary     · Left ventricular systolic function is normal. Calculated EF = 61.0%. Estimated EF was in agreement with the calculated EF. Normal left ventricular cavity size noted. All left ventricular wall segments contract normally.  · There is mild concentric LVH, with moderate asymmetric septal hypertrophy; there is no evidence of obstruction. Left ventricular diastolic dysfunction is noted (grade I a " w/high LAP) consistent with impaired relaxation.  · Left atrial cavity size is mildly dilated. Saline test results are negative.  · There is moderate calcification of the aortic valve.No significant aortic valve regurgitation is present. No hemodynamically significant aortic valve stenosis is present.         Imaging  Chest X-ray:   12/2/19  Study Result     PA AND LATERAL CHEST     CLINICAL HISTORY: Dyspnea. Hypertension.     Compared to the previous chest x-ray dated 08/07/2019.     The lungs appear well-expanded and are free of infiltrates. There are no  pleural effusions. The heart is normal in size. The thoracic aorta is  mildly ectatic.     IMPRESSIONS: No evidence of acute disease within the chest     This report was finalized on 12/2/2019 9:33 AM by Dr. Aleks Vences M.D.          CT of head  Study Result     CT SCAN HEAD WITHOUT CONTRAST     CLINICAL HISTORY: Confusion and delirium.     TECHNIQUE: CT scan of the head was obtained with 3 mm axial images. No  intravenous contrast was administered.     FINDINGS:     The ventricles, sulci, and cisterns are age appropriate. There are  mild-to-moderate changes of chronic small vessel ischemic phenomena. The  basal ganglia and thalami are remarkable for prominent areas of  senescent mineralization involving the globus pallidus. The posterior  fossa structures are also incidentally remarkable for prominent areas of  mineralization involving the dentate nuclei and the cerebellar  hemispheric white matter. Atherosclerotic changes are seen within the  intracranial vasculature. When compared to the prior head CT as part of  the prior CT angiogram dated 08/09/2019, there is no significant  interval change.     IMPRESSION:     No CT evidence for acute intracranial pathology. Further evaluation  could be performed with MR imaging for more sensitive and specific  detection of intracranial pathology as clinically indicated.     These findings and recommendations were  directly discussed with Lisa Lewis of the emergency room staff on 12/02/2019 at approximately 8:54  AM.           Radiation dose reduction techniques were utilized, including automated  exposure control and exposure modulation based on body size.     This report was finalized on 12/2/2019 1:32 PM by Dr. Woodrow Jimenez M.D.           Lab Review   Results from last 7 days   Lab Units 12/02/19  0815   TROPONIN T ng/mL 0.024         Results from last 7 days   Lab Units 12/02/19  0815   SODIUM mmol/L 149*   POTASSIUM mmol/L 4.5   BUN mg/dL 46*   CREATININE mg/dL 1.69*   CALCIUM mg/dL 11.4*        Results from last 7 days   Lab Units 12/02/19  0815   WBC 10*3/mm3 8.43   HEMOGLOBIN g/dL 11.6*   HEMATOCRIT % 34.2   PLATELETS 10*3/mm3 316              I have reviewed the patient's recent medical history and current medications, as well as personally reviewed/interpreted the ECG/telemetry data    The following medical decision was discussed in detail with Dr. Huizar        Assessment:  1.  Complicated UTI  2.  Altered mental status  3.  Lactic acidosis  4. Dehydration  5.  Hypertension  6.  Hyperlipidemia       Recommendations / Plan:   Maru Richey is a 89-year-old female, who is new to our service.  We were consulted for elevated troponin. Elevation probable due to increased creatinine at 1.69.  Unable to obtain review of systems, due to altered mental status.  ECG on admit shows sinus tachycardia with heart rate in low 100s.  Blood pressure is elevated on admit.  She was started today on beta-blocker, isosorbide, and Norvasc.  At this time, she has not yet had the medication.  Her last lipid profile showed good control.  She was placed on a statin.  In the a.m. we will do a troponin and ECG. Further recommendations will be dependent on trop and ecg in am.     Labs/tests ordered for am: Obtain any cardiac records from facility troponin and ECG in a.m.      Tasia Petreson, LAURIE  12/2/2019, 4:14 PM      Patient  personally interviewed and above subjective findings personally confirmed during a face to face contact with patient today  All findings of physical examination confirmed  All pertinent and performed labs, cardiac procedures ,  radiographs of the last 24 hours personally reviewed  Impression and plans discussed/elaborated and implemented jointly as described above     Frank Huizar MD          EMR Dragon/Transcription:   Dictated utilizing Dragon dictation

## 2019-12-02 NOTE — PLAN OF CARE
Problem: Patient Care Overview  Goal: Plan of Care Review  Outcome: Ongoing (interventions implemented as appropriate)   12/02/19 7286   Coping/Psychosocial   Plan of Care Reviewed With patient   OTHER   Outcome Summary admitted from ED, incontinent urine with saturated brief, foul smelling dark tea-colored urine. Purewick applied. stage 3 PU to coccyx, cleaned and mepilex applied. BP elevated, meds ordered. minimal response to voice, moans and cries occas. IVF infusing. scds applied. turning q2. will encouraged PO intake     Goal: Individualization and Mutuality  Outcome: Ongoing (interventions implemented as appropriate)    Goal: Discharge Needs Assessment  Outcome: Ongoing (interventions implemented as appropriate)    Goal: Interprofessional Rounds/Family Conf  Outcome: Ongoing (interventions implemented as appropriate)      Problem: Fall Risk (Adult)  Goal: Identify Related Risk Factors and Signs and Symptoms  Outcome: Outcome(s) achieved Date Met: 12/02/19    Goal: Absence of Fall  Outcome: Ongoing (interventions implemented as appropriate)      Problem: Skin Injury Risk (Adult)  Goal: Identify Related Risk Factors and Signs and Symptoms  Outcome: Outcome(s) achieved Date Met: 12/02/19    Goal: Skin Health and Integrity  Outcome: Ongoing (interventions implemented as appropriate)      Problem: Urinary Tract Infection (Adult)  Goal: Signs and Symptoms of Listed Potential Problems Will be Absent, Minimized or Managed (Urinary Tract Infection)  Outcome: Ongoing (interventions implemented as appropriate)

## 2019-12-02 NOTE — ED PROVIDER NOTES
EMERGENCY DEPARTMENT ENCOUNTER    Room Number:  P682/1  Date of encounter:  12/2/2019  PCP: Issac Navas MD  Historian: facility staff      HPI:  Chief Complaint: altered mental status  A complete HPI/ROS/PMH/PSH/SH/FH are unobtainable due to: mental status change  Context: Maru Richey is a 89 y.o. female who presents from [facility] to the ED c/o AMS that was first noticed by facility staff earlier this morning after the pt woke up. Per facility staff, pt was lethargic and her speech was slurred this morning. Pt normally has clear speech at baseline and responds to questions appropriately. Pt is currently taking keflex to treat a UTI that was cultured and tested as susceptible to that medication. Pt's hx is limited secondary to mental status change.         PAST MEDICAL HISTORY  Active Ambulatory Problems     Diagnosis Date Noted   • Syncope 08/07/2019   • Hypertension 08/07/2019   • Hyperlipidemia 08/07/2019   • History of peptic ulcer 08/07/2019   • Weight loss, abnormal 08/07/2019   • Osteoarthritis of multiple joints 09/25/2019   • Compression fracture of T10 vertebra (CMS/[facility]) 10/07/2019   • Dehydration 10/08/2019   • Knee pain 10/08/2019   • Slow transit constipation 11/09/2019   • Iron deficiency 11/09/2019   • Acute renal failure superimposed on stage 2 chronic kidney disease (CMS/[facility]) 11/09/2019   • Acute urinary retention 11/09/2019   • Acute kidney failure (CMS/[facility]) 11/09/2019   • Chest pain, atypical 11/10/2019     Resolved Ambulatory Problems     Diagnosis Date Noted   • No Resolved Ambulatory Problems     Past Medical History:   Diagnosis Date   • Hyperlipidemia    • Hypertension    • Iron deficiency          PAST SURGICAL HISTORY  Past Surgical History:   Procedure Laterality Date   • APPENDECTOMY     • HYSTERECTOMY           FAMILY HISTORY  No family history on file.      SOCIAL HISTORY  Social History     Socioeconomic History   • Marital status: Single     Spouse name: Not on file   •  Number of children: Not on file   • Years of education: Not on file   • Highest education level: Not on file   Tobacco Use   • Smoking status: Never Smoker   • Smokeless tobacco: Never Used   Substance and Sexual Activity   • Alcohol use: No     Frequency: Never   • Drug use: No         ALLERGIES  Patient has no known allergies.        REVIEW OF SYSTEMS  Review of Systems   Unable to perform ROS: Mental status change        All systems reviewed and negative except for those discussed in HPI.       PHYSICAL EXAM    I have reviewed the triage vital signs and nursing notes.    ED Triage Vitals   Temp Heart Rate Resp BP SpO2   12/02/19 0732 12/02/19 0732 12/02/19 0733 12/02/19 0732 12/02/19 0732   97.4 °F (36.3 °C) 97 20 154/76 100 %      Temp src Heart Rate Source Patient Position BP Location FiO2 (%)   12/02/19 0732 -- -- -- --   Tympanic           GENERAL: not distressed  HENT: nares patent  EYES: no scleral icterus  CV: regular rhythm, regular rate  RESPIRATORY: normal effort  ABDOMEN: soft  MUSCULOSKELETAL: no deformity  NEURO: awake, alert, answers questions but speech is not discernible   SKIN: warm, dry, healing nickel-sized pressure sore over the coccyx        LAB RESULTS  Recent Results (from the past 24 hour(s))   Urinalysis With Microscopic If Indicated (No Culture) - Urine, Catheter In/Out    Collection Time: 12/02/19  8:03 AM   Result Value Ref Range    Color, UA Orange (A) Yellow, Straw    Appearance, UA Turbid (A) Clear    pH, UA >=9.0 (H) 5.0 - 8.0    Specific Gravity, UA 1.018 1.005 - 1.030    Glucose, UA Negative Negative    Ketones, UA Negative Negative    Bilirubin, UA Negative Negative    Blood, UA Large (3+) (A) Negative    Protein, UA >=300 mg/dL (3+) (A) Negative    Leuk Esterase, UA Large (3+) (A) Negative    Nitrite, UA Negative Negative    Urobilinogen, UA 0.2 E.U./dL 0.2 - 1.0 E.U./dL   Urinalysis, Microscopic Only - Urine, Catheter In/Out    Collection Time: 12/02/19  8:03 AM   Result Value  Ref Range    RBC, UA Unable to determine due to loaded field (A) None Seen, 0-2 /HPF    WBC, UA Too Numerous to Count (A) None Seen, 0-2 /HPF    Bacteria, UA Unable to determine due to loaded field (A) None Seen /HPF    Squamous Epithelial Cells, UA Unable to determine due to loaded field (A) None Seen, 0-2 /HPF    Hyaline Casts, UA None Seen None Seen /LPF    Methodology Manual Light Microscopy    Comprehensive Metabolic Panel    Collection Time: 12/02/19  8:15 AM   Result Value Ref Range    Glucose 144 (H) 65 - 99 mg/dL    BUN 46 (H) 8 - 23 mg/dL    Creatinine 1.69 (H) 0.57 - 1.00 mg/dL    Sodium 149 (H) 136 - 145 mmol/L    Potassium 4.5 3.5 - 5.2 mmol/L    Chloride 109 (H) 98 - 107 mmol/L    CO2 22.7 22.0 - 29.0 mmol/L    Calcium 11.4 (H) 8.2 - 9.6 mg/dL    Total Protein 8.4 6.0 - 8.5 g/dL    Albumin 3.20 (L) 3.50 - 5.20 g/dL    ALT (SGPT) 17 1 - 33 U/L    AST (SGOT) 44 (H) 1 - 32 U/L    Alkaline Phosphatase 84 39 - 117 U/L    Total Bilirubin 0.5 0.2 - 1.2 mg/dL    eGFR  African Amer 34 (L) >60 mL/min/1.73    Globulin 5.2 gm/dL    A/G Ratio 0.6 g/dL    BUN/Creatinine Ratio 27.2 (H) 7.0 - 25.0    Anion Gap 17.3 (H) 5.0 - 15.0 mmol/L   Lactic Acid, Plasma    Collection Time: 12/02/19  8:15 AM   Result Value Ref Range    Lactate 2.6 (C) 0.5 - 2.0 mmol/L   Procalcitonin    Collection Time: 12/02/19  8:15 AM   Result Value Ref Range    Procalcitonin 0.60 (H) 0.10 - 0.25 ng/mL   Troponin    Collection Time: 12/02/19  8:15 AM   Result Value Ref Range    Troponin T 0.024 0.000 - 0.030 ng/mL   CBC Auto Differential    Collection Time: 12/02/19  8:15 AM   Result Value Ref Range    WBC 8.43 3.40 - 10.80 10*3/mm3    RBC 4.00 3.77 - 5.28 10*6/mm3    Hemoglobin 11.6 (L) 12.0 - 15.9 g/dL    Hematocrit 34.2 34.0 - 46.6 %    MCV 85.5 79.0 - 97.0 fL    MCH 29.0 26.6 - 33.0 pg    MCHC 33.9 31.5 - 35.7 g/dL    RDW 13.6 12.3 - 15.4 %    RDW-SD 42.2 37.0 - 54.0 fl    MPV 10.6 6.0 - 12.0 fL    Platelets 316 140 - 450 10*3/mm3     Neutrophil % 82.8 (H) 42.7 - 76.0 %    Lymphocyte % 8.1 (L) 19.6 - 45.3 %    Monocyte % 7.2 5.0 - 12.0 %    Eosinophil % 0.0 (L) 0.3 - 6.2 %    Basophil % 0.4 0.0 - 1.5 %    Immature Grans % 1.5 (H) 0.0 - 0.5 %    Neutrophils, Absolute 6.98 1.70 - 7.00 10*3/mm3    Lymphocytes, Absolute 0.68 (L) 0.70 - 3.10 10*3/mm3    Monocytes, Absolute 0.61 0.10 - 0.90 10*3/mm3    Eosinophils, Absolute 0.00 0.00 - 0.40 10*3/mm3    Basophils, Absolute 0.03 0.00 - 0.20 10*3/mm3    Immature Grans, Absolute 0.13 (H) 0.00 - 0.05 10*3/mm3    nRBC 0.1 0.0 - 0.2 /100 WBC   Scan Slide    Collection Time: 12/02/19  8:15 AM   Result Value Ref Range    RBC Morphology Normal Normal    WBC Morphology Normal Normal    Platelet Morphology Normal Normal   Lactic Acid, Reflex Timer (This will reflex a repeat order 3-3:15 hours after ordered.)    Collection Time: 12/02/19  8:15 AM   Result Value Ref Range    Extra Tube Hold for add-ons.        Ordered the above labs and independently reviewed the results.        RADIOLOGY  Xr Chest 2 View    Result Date: 12/2/2019  PA AND LATERAL CHEST  CLINICAL HISTORY: Dyspnea. Hypertension.  Compared to the previous chest x-ray dated 08/07/2019.  The lungs appear well-expanded and are free of infiltrates. There are no pleural effusions. The heart is normal in size. The thoracic aorta is mildly ectatic.  IMPRESSIONS: No evidence of acute disease within the chest  This report was finalized on 12/2/2019 9:33 AM by Dr. Aleks Vences M.D.      Ct Head Without Contrast    Result Date: 12/2/2019  CT SCAN HEAD WITHOUT CONTRAST  CLINICAL HISTORY: Confusion and delirium.  TECHNIQUE: CT scan of the head was obtained with 3 mm axial images. No intravenous contrast was administered.  FINDINGS:  The ventricles, sulci, and cisterns are age appropriate. There are mild-to-moderate changes of chronic small vessel ischemic phenomena. The basal ganglia and thalami are remarkable for prominent areas of senescent mineralization  involving the globus pallidus. The posterior fossa structures are also incidentally remarkable for prominent areas of mineralization involving the dentate nuclei and the cerebellar hemispheric white matter. Atherosclerotic changes are seen within the intracranial vasculature. When compared to the prior head CT as part of the prior CT angiogram dated 08/09/2019, there is no significant interval change.       No CT evidence for acute intracranial pathology. Further evaluation could be performed with MR imaging for more sensitive and specific detection of intracranial pathology as clinically indicated.  These findings and recommendations were directly discussed with Lisa Lewis of the emergency room staff on 12/02/2019 at approximately 8:54 AM.    Radiation dose reduction techniques were utilized, including automated exposure control and exposure modulation based on body size.  This report was finalized on 12/2/2019 1:32 PM by Dr. Woodrow Jimenez M.D.        I ordered the above noted radiological studies. Reviewed by me and discussed with radiologist Dr. Jimenez.  See dictation for official radiology interpretation.      PROCEDURES    Procedures      MEDICATIONS GIVEN IN ER    Medications   sodium chloride 0.9 % bolus 1,000 mL (0 mL Intravenous Stopped 12/2/19 1200)   cefTRIAXone (ROCEPHIN) IVPB 1 g (0 g Intravenous Stopped 12/2/19 1112)         PROGRESS, DATA ANALYSIS, CONSULTS, AND MEDICAL DECISION MAKING    All labs have been independently reviewed by me.  All radiology studies have been reviewed by me and discussed with radiologist dictating the report.   EKG's independently viewed and interpreted by me.  Discussion below represents my analysis of pertinent findings related to patient's condition, differential diagnosis, treatment plan and final disposition.      ED Course as of Dec 02 1426   Mon Dec 02, 2019   0754 Spoke with nurse from [facility]. She reports the pt normally has clear speech and is slow to answer  questions but answers them appropriately. She was lethargic today, speech was slurred. She had very little PO intake yesterday. Additionally she had greater than 100,000 colony count proteus. Susceptible to keflex. 11/26 reportedly had foul smelling urine , sent for culture.  Pt is intermittently straight cathed for urinary retention at baseline.   [JS]      ED Course User Index  [JS] Lisa Lewis, LAURIE      0917 IV Rocephin ordered for pt's UTI.     1134 Reviewed pt's case with Dr. Wilkes (ER Physician). After a bedside evaluation, Dr. Wilkes agrees with the plan of care.     1302 Received a call from Dr. Douglass ([facility]) and discussed patient's case. Dr. Douglass agrees with the plan to admit the pt.          AS OF 2:26 PM VITALS:    BP - (!) 182/106  HR - 115  TEMP - 97.4 °F (36.3 °C) (Tympanic)  O2 SATS - 99%        DIAGNOSIS  Final diagnoses:   Complicated UTI (urinary tract infection)   Altered mental status, unspecified altered mental status type   Lactic acidosis   Dehydration         DISPOSITION    ADMISSION    Discussed treatment plan and reason for admission with pt/family and admitting physician.  Pt/family voiced understanding of the plan for admission for further testing/treatment as needed.     Documentation assistance provided by hasmukh Beebe for Lisa Joshua (APRN).  Information recorded by the hasmukh was done at my direction and has been verified and validated by me.            Kendall Beebe  12/02/19 2589       Lisa Lewis APRN  12/02/19 9045

## 2019-12-02 NOTE — CODE DOCUMENTATION
MD notified and orders received prior to RRT arrival. O2/Hr sensor was not reading appropriately. Staff had tried different sensors without benefit. RRT called. PT placed on crash cart monitor. HR is ST with frequent ectopy rate 110's.

## 2019-12-02 NOTE — ED TRIAGE NOTES
"Call placed to [facility] and spoke to BRODERICK Kincaid. She states \"Yesterday we received a positive culture back from one of her UAs. We started her on Keflex and she had one dose last night. We noticed she was eating as much and was a little more tired than normal last night and throughout the night the night shift nurse reported that her heart rate and blood pressure began to increase. This morning when I woke her up she was very lethargic, she is normally able to hold a conversation and is A&O to self and situation.\"  "

## 2019-12-02 NOTE — H&P
"History and physical     Primary care physician  Dr. Navas     Chief complaint  Altered mental status    History of present illness  89-year-old female with history of hypertension hyperlipidemia and iron deficiency anemia who is a nursing home resident sent to the emergency room at [facility] with altered mental status.  Patient is sleepy lethargic.  Patient is in no respiratory distress and work-up in ER revealed dehydration and complicated UTI admit for management.  Patient has dementia and unable to give detailed history most of the history obtained from the chart  nursing staff and old record.  Patient has elevated troponin with no chest pain shortness of breath or palpitation.  Patient admitted for management.    PAST MEDICAL HISTORY  • Hyperlipidemia     • Hypertension     • Iron deficiency        PAST SURGICAL HISTORY  Surgical History         Past Surgical History:   Procedure Laterality Date   • APPENDECTOMY       • HYSTERECTOMY             FAMILY HISTORY  No family history on file.     SOCIAL HISTORY  Social History   Social History            Socioeconomic History   • Marital status: Single       Spouse name: Not on file   • Number of children: Not on file   • Years of education: Not on file   • Highest education level: Not on file   Tobacco Use   • Smoking status: Never Smoker   • Smokeless tobacco: Never Used   Substance and Sexual Activity   • Alcohol use: No       Frequency: Never   • Drug use: No         ALLERGIES  Patient has no known allergies.  Nursing Home medications reviewed     REVIEW OF SYSTEMS  Unable to perform ROS: Mental status change       PHYSICAL EXAM   Blood pressure (!) 171/107, pulse 115, temperature 95.3 °F (35.2 °C), temperature source Axillary, resp. rate 18, height 162.6 cm (64\"), weight 49.1 kg (108 lb 3.2 oz), SpO2 100 %.    GENERAL: not distressed  HENT: nares patent  EYES: no scleral icterus  CV: regular rhythm, regular rate  RESPIRATORY: normal effort  ABDOMEN: " "soft  MUSCULOSKELETAL: no deformity  NEURO: awake, alert, answers questions but speech is not discernible   SKIN: warm, dry, healing nickel-sized pressure sore over the coccyx     LAB RESULTS  Lab Results (last 24 hours)     Procedure Component Value Units Date/Time    Lactic Acid, Reflex Timer (This will reflex a repeat order 3-3:15 hours after ordered.) [[id number]] Collected:  12/02/19 0815    Specimen:  Blood Updated:  12/02/19 1202     Extra Tube Hold for add-ons.     Comment: Auto resulted.       Blood Culture - Blood, Arm, Left [[id number]] Collected:  12/02/19 1137    Specimen:  Blood from Arm, Left Updated:  12/02/19 1139    Urine Culture - Urine, Urine, Catheter In/Out [[id number]] Collected:  12/02/19 0803    Specimen:  Urine, Catheter In/Out Updated:  12/02/19 1035    Blood Culture - Blood, Hand, Right [[id number]] Collected:  12/02/19 1029    Specimen:  Blood from Hand, Right Updated:  12/02/19 1032    Procalcitonin [[id number]]  (Abnormal) Collected:  12/02/19 0815    Specimen:  Blood Updated:  12/02/19 0902     Procalcitonin 0.60 ng/mL     Narrative:       As a Marker for Sepsis (Non-Neonates):   1. <0.5 ng/mL represents a low risk of severe sepsis and/or septic shock.  1. >2 ng/mL represents a high risk of severe sepsis and/or septic shock.    As a Marker for Lower Respiratory Tract Infections that require antibiotic therapy:  PCT on Admission     Antibiotic Therapy             6-12 Hrs later  > 0.5                Strongly Recommended            >0.25 - <0.5         Recommended  0.1 - 0.25           Discouraged                   Remeasure/reassess PCT  <0.1                 Strongly Discouraged          Remeasure/reassess PCT      As 28 day mortality risk marker: \"Change in Procalcitonin Result\" (> 80 % or <=80 %) if Day 0 (or Day 1) and Day 4 values are available. Refer to http://www.[employer] Vari[employer]-pct-calculator.com/   Change in PCT <=80 %   A decrease of PCT levels below or equal to 80 % defines a positive " change in PCT test result representing a higher risk for 28-day all-cause mortality of patients diagnosed with severe sepsis or septic shock.  Change in PCT > 80 %   A decrease of PCT levels of more than 80 % defines a negative change in PCT result representing a lower risk for 28-day all-cause mortality of patients diagnosed with severe sepsis or septic shock.                Lactic Acid, Plasma [[id number]]  (Abnormal) Collected:  12/02/19 0815    Specimen:  Blood Updated:  12/02/19 0855     Lactate 2.6 mmol/L     Comprehensive Metabolic Panel [[id number]]  (Abnormal) Collected:  12/02/19 0815    Specimen:  Blood Updated:  12/02/19 0853     Glucose 144 mg/dL      BUN 46 mg/dL      Creatinine 1.69 mg/dL      Sodium 149 mmol/L      Potassium 4.5 mmol/L      Chloride 109 mmol/L      CO2 22.7 mmol/L      Calcium 11.4 mg/dL      Total Protein 8.4 g/dL      Albumin 3.20 g/dL      ALT (SGPT) 17 U/L      AST (SGOT) 44 U/L      Alkaline Phosphatase 84 U/L      Total Bilirubin 0.5 mg/dL      eGFR  African Amer 34 mL/min/1.73      Globulin 5.2 gm/dL      A/G Ratio 0.6 g/dL      BUN/Creatinine Ratio 27.2     Anion Gap 17.3 mmol/L     Narrative:       GFR Normal >60  Chronic Kidney Disease <60  Kidney Failure <15    Troponin [[id number]]  (Normal) Collected:  12/02/19 0815    Specimen:  Blood Updated:  12/02/19 0850     Troponin T 0.024 ng/mL     Narrative:       Troponin T Reference Range:  <= 0.03 ng/mL-   Negative for AMI  >0.03 ng/mL-     Abnormal for myocardial necrosis.  Clinicians would have to utilize clinical acumen, EKG, Troponin and serial changes to determine if it is an Acute Myocardial Infarction or myocardial injury due to an underlying chronic condition.     Scan Slide [[id number]]  (Normal) Collected:  12/02/19 0815    Specimen:  Blood Updated:  12/02/19 0844     RBC Morphology Normal     WBC Morphology Normal     Platelet Morphology Normal    CBC & Differential [[id number]] Collected:  12/02/19 0815    Specimen:   Blood Updated:  12/02/19 0844    Narrative:       The following orders were created for panel order CBC & Differential.  Procedure                               Abnormality         Status                     ---------                               -----------         ------                     CBC Auto Differential[[id number]]        Abnormal            Final result                 Please view results for these tests on the individual orders.    CBC Auto Differential [[id number]]  (Abnormal) Collected:  12/02/19 0815    Specimen:  Blood Updated:  12/02/19 0844     WBC 8.43 10*3/mm3      RBC 4.00 10*6/mm3      Hemoglobin 11.6 g/dL      Hematocrit 34.2 %      MCV 85.5 fL      MCH 29.0 pg      MCHC 33.9 g/dL      RDW 13.6 %      RDW-SD 42.2 fl      MPV 10.6 fL      Platelets 316 10*3/mm3      Neutrophil % 82.8 %      Lymphocyte % 8.1 %      Monocyte % 7.2 %      Eosinophil % 0.0 %      Basophil % 0.4 %      Immature Grans % 1.5 %      Neutrophils, Absolute 6.98 10*3/mm3      Lymphocytes, Absolute 0.68 10*3/mm3      Monocytes, Absolute 0.61 10*3/mm3      Eosinophils, Absolute 0.00 10*3/mm3      Basophils, Absolute 0.03 10*3/mm3      Immature Grans, Absolute 0.13 10*3/mm3      nRBC 0.1 /100 WBC     Urinalysis, Microscopic Only - Urine, Catheter In/Out [[id number]]  (Abnormal) Collected:  12/02/19 0803    Specimen:  Urine, Catheter In/Out Updated:  12/02/19 0833     RBC, UA       Unable to determine due to loaded field     /HPF     WBC, UA Too Numerous to Count /HPF      Bacteria, UA       Unable to determine due to loaded field     /HPF     Squamous Epithelial Cells, UA       Unable to determine due to loaded field     /HPF     Hyaline Casts, UA None Seen /LPF      Methodology Manual Light Microscopy    Urinalysis With Microscopic If Indicated (No Culture) - Urine, Catheter In/Out [[id number]]  (Abnormal) Collected:  12/02/19 0803    Specimen:  Urine, Catheter In/Out Updated:  12/02/19 0832     Color, UA [place]     Comment:  Any Substance that causes an abnormal urine color can alter the accuracy of the chemical reactions.        Appearance, UA Turbid     pH, UA >=9.0     Specific Gravity, UA 1.018     Glucose, UA Negative     Ketones, UA Negative     Bilirubin, UA Negative     Blood, UA Large (3+)     Protein, UA >=300 mg/dL (3+)     Leuk Esterase, UA Large (3+)     Nitrite, UA Negative     Urobilinogen, UA 0.2 E.U./dL        Imaging Results (Last 24 Hours)     Procedure Component Value Units Date/Time    CT Head Without Contrast [[id number]] Collected:  12/02/19 0931     Updated:  12/02/19 1335    Narrative:       CT SCAN HEAD WITHOUT CONTRAST     CLINICAL HISTORY: Confusion and delirium.     TECHNIQUE: CT scan of the head was obtained with 3 mm axial images. No  intravenous contrast was administered.     FINDINGS:     The ventricles, sulci, and cisterns are age appropriate. There are  mild-to-moderate changes of chronic small vessel ischemic phenomena. The  basal ganglia and thalami are remarkable for prominent areas of  senescent mineralization involving the globus pallidus. The posterior  fossa structures are also incidentally remarkable for prominent areas of  mineralization involving the dentate nuclei and the cerebellar  hemispheric white matter. Atherosclerotic changes are seen within the  intracranial vasculature. When compared to the prior head CT as part of  the prior CT angiogram dated 08/09/2019, there is no significant  interval change.       Impression:          No CT evidence for acute intracranial pathology. Further evaluation  could be performed with MR imaging for more sensitive and specific  detection of intracranial pathology as clinically indicated.     These findings and recommendations were directly discussed with Lisa Lewis of the emergency room staff on 12/02/2019 at approximately 8:54  AM.           Radiation dose reduction techniques were utilized, including automated  exposure control and exposure  modulation based on body size.     This report was finalized on 12/2/2019 1:32 PM by Dr. Woodrow Jimenez M.D.       XR Chest 2 View [[id number]] Collected:  12/02/19 0931     Updated:  12/02/19 0936    Narrative:       PA AND LATERAL CHEST     CLINICAL HISTORY: Dyspnea. Hypertension.     Compared to the previous chest x-ray dated 08/07/2019.     The lungs appear well-expanded and are free of infiltrates. There are no  pleural effusions. The heart is normal in size. The thoracic aorta is  mildly ectatic.     IMPRESSIONS: No evidence of acute disease within the chest     This report was finalized on 12/2/2019 9:33 AM by Dr. Aleks Vences M.D.         ECG 12 Lead        HEART RATE= 108  bpm  RR Interval= 560  ms  MO Interval= 139  ms  P Horizontal Axis= 16  deg  P Front Axis= 66  deg  QRSD Interval= 82  ms  QT Interval= 328  ms  QRS Axis= 18  deg  T Wave Axis= 92  deg  - ABNORMAL ECG -  Sinus tachycardia  Atrial premature complex  Nonspecific T abnormalities, lateral leads  When compared with ECG of 11-Nov-2019 6:06:10,  No significant change                Current Facility-Administered Medications:   •  brimonidine (ALPHAGAN P) 0.1 % ophthalmic solution 1 drop, 1 drop, Both Eyes, BID, Diego Douglass MD  •  [START ON 12/3/2019] cefTRIAXone (ROCEPHIN) IVPB 1 g, 1 g, Intravenous, Q24H, Diego Douglass MD  •  fluticasone (FLONASE) 50 MCG/ACT nasal spray 1 spray, 1 spray, Nasal, Daily PRN, Diego Douglass MD  •  multivitamin with minerals 1 tablet, 1 tablet, Oral, Daily, Diego Douglass MD  •  ondansetron (ZOFRAN) tablet 4 mg, 4 mg, Oral, Q6H PRN, Diego Douglass MD  •  sodium chloride 0.45 % infusion, 75 mL/hr, Intravenous, Continuous, Diego Douglass MD     ASSESSMENT  Acute UTI with sepsis  Dehydration  Elevated troponin   Dementia  Hypertension   Hyperlipidemia   Gastroesophageal for disease     PLAN  Admit  IV fluid  IV antibiotics  Serial cardiac enzymes and EKG  Adjust nursing home medications  Stress ulcer DVT  prophylaxis  Supportive care  DNR  Follow closely further recommendation according to hospital course    LYDIA SHEIKH MD

## 2019-12-03 NOTE — PROGRESS NOTES
"Daily progress note    Chief complaint  Events noted  Remain confused  Eyes open and follows some commands  No specific complaints  Not eating drinking  No family member available    History of present illness  89-year-old female with history of hypertension hyperlipidemia and iron deficiency anemia who is a nursing home resident sent to the emergency room at [facility] with altered mental status.  Patient is sleepy lethargic.  Patient is in no respiratory distress and work-up in ER revealed dehydration and complicated UTI admit for management.  Patient has dementia and unable to give detailed history most of the history obtained from the chart  nursing staff and old record.  Patient has elevated troponin with no chest pain shortness of breath or palpitation.  Patient admitted for management.     REVIEW OF SYSTEMS  Unable to perform ROS: Mental status change       PHYSICAL EXAM   Blood pressure 168/100, pulse 109, temperature 97.9 °F (36.6 °C), temperature source Oral, resp. rate 16, height 162.6 cm (64.02\"), weight 50 kg (110 lb 3.7 oz), SpO2 93 %.    GENERAL: not distressed  HENT: nares patent  EYES: no scleral icterus  CV: regular rhythm, regular rate  RESPIRATORY: normal effort  ABDOMEN: soft  MUSCULOSKELETAL: no deformity  NEURO: awake, alert, answers questions but speech is not discernible   SKIN: warm, dry, healing nickel-sized pressure sore over the coccyx     LAB RESULTS  Lab Results (last 24 hours)     Procedure Component Value Units Date/Time    Blood Culture - Blood, Arm, Left [[id number]] Collected:  12/02/19 1137    Specimen:  Blood from Arm, Left Updated:  12/03/19 1146     Blood Culture No growth at 24 hours    Urine Culture - Urine, Urine, Catheter In/Out [[id number]]  (Abnormal) Collected:  12/02/19 0803    Specimen:  Urine, Catheter In/Out Updated:  12/03/19 0939     Urine Culture >100,000 CFU/mL Gram Negative Bacilli    Blood Culture ID, PCR - Blood, Hand, Right [[id number]]  (Abnormal) Collected:  " 12/02/19 1029    Specimen:  Blood from Hand, Right Updated:  12/03/19 0748     BCID, PCR Proteus spp. Identification by BCID PCR.    Comprehensive Metabolic Panel [[id number]]  (Abnormal) Collected:  12/03/19 0438    Specimen:  Blood Updated:  12/03/19 0658     Glucose 115 mg/dL      BUN 45 mg/dL      Creatinine 1.35 mg/dL      Sodium 140 mmol/L      Potassium 3.2 mmol/L      Chloride 107 mmol/L      CO2 18.8 mmol/L      Calcium 10.1 mg/dL      Total Protein 6.7 g/dL      Albumin 2.60 g/dL      ALT (SGPT) 14 U/L      AST (SGOT) 26 U/L      Alkaline Phosphatase 76 U/L      Total Bilirubin 0.3 mg/dL      eGFR  African Amer 44 mL/min/1.73      Globulin 4.1 gm/dL      A/G Ratio 0.6 g/dL      BUN/Creatinine Ratio 33.3     Anion Gap 14.2 mmol/L     Narrative:       GFR Normal >60  Chronic Kidney Disease <60  Kidney Failure <15    BNP [[id number]]  (Abnormal) Collected:  12/03/19 0438    Specimen:  Blood Updated:  12/03/19 0653     proBNP 2,576.0 pg/mL     Narrative:       Among patients with dyspnea, NT-proBNP is highly sensitive for the detection of acute congestive heart failure. In addition NT-proBNP of <300 pg/ml effectively rules out acute congestive heart failure with 99% negative predictive value.    TSH [[id number]]  (Normal) Collected:  12/03/19 0438    Specimen:  Blood Updated:  12/03/19 0653     TSH 0.302 uIU/mL     Troponin [[id number]]  (Normal) Collected:  12/03/19 0438    Specimen:  Blood Updated:  12/03/19 0653     Troponin T 0.018 ng/mL     Narrative:       Troponin T Reference Range:  <= 0.03 ng/mL-   Negative for AMI  >0.03 ng/mL-     Abnormal for myocardial necrosis.  Clinicians would have to utilize clinical acumen, EKG, Troponin and serial changes to determine if it is an Acute Myocardial Infarction or myocardial injury due to an underlying chronic condition.     Lipid Panel [[id number]]  (Abnormal) Collected:  12/03/19 0438    Specimen:  Blood Updated:  12/03/19 0652     Total Cholesterol 149 mg/dL       Triglycerides 118 mg/dL      HDL Cholesterol 21 mg/dL      LDL Cholesterol  104 mg/dL      VLDL Cholesterol 23.6 mg/dL      LDL/HDL Ratio 4.97    Narrative:       Cholesterol Reference Ranges  (U.S. Department of Health and Human Services ATP III Classifications)    Desirable          <200 mg/dL  Borderline High    200-239 mg/dL  High Risk          >240 mg/dL      Triglyceride Reference Ranges  (U.S. Department of Health and Human Services ATP III Classifications)    Normal           <150 mg/dL  Borderline High  150-199 mg/dL  High             200-499 mg/dL  Very High        >500 mg/dL    HDL Reference Ranges  (U.S. Department of Health and Human Services ATP III Classifcations)    Low     <40 mg/dl (major risk factor for CHD)  High    >60 mg/dl ('negative' risk factor for CHD)        LDL Reference Ranges  (U.S. Department of Health and Human Services ATP III Classifcations)    Optimal          <100 mg/dL  Near Optimal     100-129 mg/dL  Borderline High  130-159 mg/dL  High             160-189 mg/dL  Very High        >189 mg/dL    Blood Culture - Blood, Hand, Right [[id number]]  (Abnormal) Collected:  12/02/19 1029    Specimen:  Blood from Hand, Right Updated:  12/03/19 0635     Blood Culture Abnormal Stain     Gram Stain Aerobic Bottle Gram negative bacilli    Hemoglobin A1c [[id number]]  (Abnormal) Collected:  12/03/19 0438    Specimen:  Blood Updated:  12/03/19 0548     Hemoglobin A1C 6.10 %     Narrative:       Hemoglobin A1C Ranges:    Increased Risk for Diabetes  5.7% to 6.4%  Diabetes                     >= 6.5%  Diabetic Goal                < 7.0%    CBC & Differential [[id number]] Collected:  12/03/19 0438    Specimen:  Blood Updated:  12/03/19 0538    Narrative:       The following orders were created for panel order CBC & Differential.  Procedure                               Abnormality         Status                     ---------                               -----------         ------                      CBC Auto Differential[[id number]]        Abnormal            Final result                 Please view results for these tests on the individual orders.    CBC Auto Differential [[id number]]  (Abnormal) Collected:  12/03/19 0438    Specimen:  Blood Updated:  12/03/19 0538     WBC 12.40 10*3/mm3      RBC 3.49 10*6/mm3      Hemoglobin 10.1 g/dL      Hematocrit 28.7 %      MCV 82.2 fL      MCH 28.9 pg      MCHC 35.2 g/dL      RDW 13.6 %      RDW-SD 39.9 fl      MPV 10.9 fL      Platelets 242 10*3/mm3      Neutrophil % 86.1 %      Lymphocyte % 5.6 %      Monocyte % 6.6 %      Eosinophil % 0.0 %      Basophil % 0.2 %      Immature Grans % 1.5 %      Neutrophils, Absolute 10.67 10*3/mm3      Lymphocytes, Absolute 0.69 10*3/mm3      Monocytes, Absolute 0.82 10*3/mm3      Eosinophils, Absolute 0.00 10*3/mm3      Basophils, Absolute 0.03 10*3/mm3      Immature Grans, Absolute 0.19 10*3/mm3      nRBC 0.1 /100 WBC     Lactic Acid, Reflex [[id number]]  (Abnormal) Collected:  12/02/19 2008    Specimen:  Blood from Arm, Left Updated:  12/02/19 2050     Lactate 2.2 mmol/L     POC Glucose Once [[id number]]  (Normal) Collected:  12/02/19 1752    Specimen:  Blood Updated:  12/02/19 1755     Glucose 94 mg/dL         Imaging Results (Last 24 Hours)     ** No results found for the last 24 hours. **      ECG 12 Lead        HEART RATE= 108  bpm  RR Interval= 560  ms  ND Interval= 139  ms  P Horizontal Axis= 16  deg  P Front Axis= 66  deg  QRSD Interval= 82  ms  QT Interval= 328  ms  QRS Axis= 18  deg  T Wave Axis= 92  deg  - ABNORMAL ECG -  Sinus tachycardia  Atrial premature complex  Nonspecific T abnormalities, lateral leads  When compared with ECG of 11-Nov-2019 6:06:10,  No significant change                Current Facility-Administered Medications:   •  amLODIPine (NORVASC) tablet 5 mg, 5 mg, Oral, Q24H, Diego Douglass MD  •  aspirin chewable tablet 81 mg, 81 mg, Oral, Daily, Diego Douglass MD  •  atorvastatin (LIPITOR) tablet 40 mg, 40  mg, Oral, Nightly, Lydia Sheikh MD  •  brimonidine (ALPHAGAN P) 0.1 % ophthalmic solution 1 drop, 1 drop, Both Eyes, BID, Lydia Sheikh MD, 1 drop at 12/03/19 1506  •  cefTRIAXone (ROCEPHIN) IVPB 2 g, 2 g, Intravenous, Q24H, Lydia Sheikh MD, Last Rate: 100 mL/hr at 12/03/19 1508, 2 g at 12/03/19 1508  •  fluticasone (FLONASE) 50 MCG/ACT nasal spray 1 spray, 1 spray, Nasal, Daily PRN, Lydia Sheikh MD  •  isosorbide dinitrate (ISORDIL) tablet 10 mg, 10 mg, Oral, TID - Nitrates, Lydia Sheikh MD  •  metoprolol tartrate (LOPRESSOR) tablet 50 mg, 50 mg, Oral, Q12H, Tasia Peterson, LAURIE  •  multivitamin with minerals 1 tablet, 1 tablet, Oral, Daily, Lydia Sheikh MD  •  nitroglycerin (NITROSTAT) SL tablet 0.4 mg, 0.4 mg, Sublingual, Q5 Min PRN, Lydia Sheikh MD  •  ondansetron (ZOFRAN) tablet 4 mg, 4 mg, Oral, Q6H PRN, Lydia Sheikh MD  •  pantoprazole (PROTONIX) EC tablet 40 mg, 40 mg, Oral, Q AM, Lydia Sheikh MD  •  sodium chloride 0.45 % infusion, 125 mL/hr, Intravenous, Continuous, Lydia Sheikh MD, Last Rate: 125 mL/hr at 12/02/19 1629, 125 mL/hr at 12/02/19 1629     ASSESSMENT  Acute UTI with sepsis  Proteus bacteremia with sepsis  Dehydration  Elevated troponin   Dementia  Hypertension   Hyperlipidemia   Gastroesophageal for disease     PLAN  CPM  IV fluid  IV antibiotics  Repeat blood cultures and infectious disease consult  Cardiology to follow patient  Adjust nursing home medications  Stress ulcer DVT prophylaxis  Supportive care  DNR  Follow closely further recommendation according to hospital course    LYDIA SHEIKH MD

## 2019-12-03 NOTE — CONSULTS
Adult Nutrition  Assessment/PES    Patient Name:  Maru Richey  YOB: 1925  MRN: [id number]  Admit Date:  12/2/2019    Assessment Date:  12/3/2019    Comments:  Nutrition assessment completed for <19 BMI and skin issues. Pt with stage 3 pressure injury to coccyx. Visited this am and encouraged breakfast. Will add supplements and monitor per protocol.    Reason for Assessment     Row Name 12/03/19 0836          Reason for Assessment    Reason For Assessment  identified at risk by screening criteria     Diagnosis  -- UTI, renal disease, cardiac dz, HTN, peptic ulcer, compression fx, iron def anemia     Identified At Risk by Screening Criteria  large or nonhealing wound, burn or pressure injury;BMI           Anthropometrics     Row Name 12/03/19 0838          Usual Body Weight (UBW)    Weight Loss Time Frame  no major wt loss wt ranges from 108-124lb        Body Mass Index (BMI)    BMI Assessment  BMI 18.5-24.9: normal         Labs/Tests/Procedures/Meds     Row Name 12/03/19 0839          Labs/Procedures/Meds    Lab Results Reviewed  reviewed     Lab Results Comments  k, vun, cr, alb, na, wbc, H/H        Diagnostic Tests/Procedures    Diagnostic Test/Procedure Reviewed  reviewed        Medications    Pertinent Medications Reviewed  reviewed     Pertinent Medications Comments  abx, mvi, protonix, IVF         Physical Findings     Row Name 12/03/19 0840          Physical Findings    Skin  pressure injury;edema coccyx stage 3         Estimated/Assessed Needs     Row Name 12/03/19 0841          Calculation Measurements    Weight Used For Calculations  50 kg (110 lb 3.7 oz)        Estimated/Assessed Needs    Additional Documentation  Fluid Requirements (Group);Protein Requirements (Group);KCAL/KG (Group)        KCAL/KG    KCAL/KG  30 Kcal/Kg (kcal)     30 Kcal/Kg (kcal)  1500        Protein Requirements    Weight Used For Protein Calculations  50 kg (110 lb 3.7 oz)     Est Protein Requirement Amount (gms/kg)   1.2 gm protein     Estimated Protein Requirements (gms/day)  60        Fluid Requirements    Estimated Fluid Requirements (mL/day)  1500     RDA Method (mL)  1500     Latesha-Segar Method (over 20 kg)  2500         Nutrition Prescription Ordered     Row Name 12/03/19 0841          Nutrition Prescription PO    Current PO Diet  Regular         Evaluation of Received Nutrient/Fluid Intake     Row Name 12/03/19 0842          PO Evaluation    % PO Intake  decrease po at this time               Problem/Interventions:  Problem 1     Row Name 12/03/19 0846          Nutrition Diagnoses Problem 1    Problem 1  Increased Nutrient Needs     Macronutrient  Kcal;Protein     Etiology (related to)  Medical Diagnosis     Skin  Pressure injury         Problem 2     Row Name 12/03/19 0847          Nutrition Diagnoses Problem 2    Problem 2  Predicted Suboptimal Intake     Etiology (related to)  Medical Diagnosis     Signs/Symptoms (evidenced by)  PO Intake             Intervention Goal     Row Name 12/03/19 0847          Intervention Goal    General  Maintain nutrition;Meet nutritional needs for age/condition;Disease management/therapy;Reduce/improve symptoms     PO  PO intake (%);Tolerate PO     PO Intake %  80 %     Weight  Appropriate weight gain         Nutrition Intervention     Row Name 12/03/19 0848          Nutrition Intervention    RD/Tech Action  Follow Tx progress;Care plan reviewd;Encourage intake;Recommend/ordered     Recommended/Ordered  Supplement         Nutrition Prescription     Row Name 12/03/19 0848          Nutrition Prescription PO    PO Prescription  Begin/change supplement     Supplement  Boost Plus     Supplement Frequency  2 times a day         Education/Evaluation     Row Name 12/03/19 0849          Education    Education  Will Instruct as appropriate        Monitor/Evaluation    Monitor  Per protocol           Electronically signed by:  Marielos Huddleston RD  12/03/19 8:50 AM

## 2019-12-03 NOTE — PLAN OF CARE
Problem: Patient Care Overview  Goal: Plan of Care Review  Outcome: Ongoing (interventions implemented as appropriate)   12/03/19 0613   Coping/Psychosocial   Plan of Care Reviewed With patient   OTHER   Outcome Summary Patient very lethargic, not comfortable giving food or liquids due to AMS, heartrate and blood pressure elevated, patient wimpering in response to questions, will CTM   Plan of Care Review   Progress no change       Problem: Fall Risk (Adult)  Goal: Absence of Fall  Outcome: Ongoing (interventions implemented as appropriate)      Problem: Skin Injury Risk (Adult)  Goal: Skin Health and Integrity  Outcome: Outcome(s) achieved Date Met: 12/03/19      Problem: Urinary Tract Infection (Adult)  Goal: Signs and Symptoms of Listed Potential Problems Will be Absent, Minimized or Managed (Urinary Tract Infection)  Outcome: Ongoing (interventions implemented as appropriate)

## 2019-12-03 NOTE — PLAN OF CARE
Problem: Nutrition, Imbalanced: Inadequate Oral Intake (Adult)  Intervention: Promote/Optimize Nutrition   12/03/19 0853   Nutrition Interventions   Oral Nutrition Promotion nutritional therapy counseling provided;calorie dense liquids provided

## 2019-12-03 NOTE — PROGRESS NOTES
"[facility]  Cardiology Progress Note    Patient Identification:  Name: Maru Richey  Age: 89 y.o.  Sex: female  :  1925  MRN: [id number]                 Follow Up / Chief Complaint: Elevated troponin    Interval History: Blood pressure increased, will increase BB.         Subjective: Unable to obtain      Objective:    Past Medical History:  Past Medical History:   Diagnosis Date   • Hyperlipidemia    • Hypertension    • Iron deficiency      Past Surgical History:  Past Surgical History:   Procedure Laterality Date   • APPENDECTOMY     • HYSTERECTOMY          Social History:   Social History     Tobacco Use   • Smoking status: Never Smoker   • Smokeless tobacco: Never Used   Substance Use Topics   • Alcohol use: No     Frequency: Never      Family History:  No family history on file.       Allergies:  No Known Allergies  Scheduled Meds:    amLODIPine 5 mg Q24H   aspirin 81 mg Daily   atorvastatin 40 mg Nightly   brimonidine 1 drop BID   cefTRIAXone 2 g Q24H   isosorbide dinitrate 10 mg TID - Nitrates   metoprolol tartrate 12.5 mg Q12H   multivitamin with minerals 1 tablet Daily   pantoprazole 40 mg Q AM           INTAKE AND OUTPUT:    Intake/Output Summary (Last 24 hours) at 12/3/2019 1205  Last data filed at 12/3/2019 0535  Gross per 24 hour   Intake --   Output 700 ml   Net -700 ml       ROS  Constitutional: Awake and alert, no fever. No nosebleeds  Abdomen           no abdominal pain   Cardiac              no chest pain  Pulmonary          no shortness of breath      /96 (BP Location: Right arm, Patient Position: Lying)   Pulse 108   Temp 97.9 °F (36.6 °C) (Oral)   Resp 18   Ht 162.6 cm (64.02\")   Wt 50 kg (110 lb 3.7 oz)   SpO2 93%   BMI 18.91 kg/m²   General appearance: No acute changes   Neck: Trachea midline; NECK, supple, no thyromegaly or lymphadenopathy   Lungs: Normal size and shape, normal breath sounds, equal distribution of air, no rales and rhonchi   CV: S1-S2 " regular, no murmurs, no rub, no gallop   Abdomen: Soft, non-tender; no masses , no abnormal abdominal sounds   Extremities: No deformity , normal color , no peripheral edema   Skin: Normal temperature, turgor and texture; no rash, ulcers            Cardiographics  Telemetry: Sinus tach with PACs    EC/2/19 sinus tachycardia       19 sinus rhythm          Echocardiogram:   19  Interpretation Summary      · Left ventricular systolic function is normal. Calculated EF = 61.0%. Estimated EF was in agreement with the calculated EF. Normal left ventricular cavity size noted. All left ventricular wall segments contract normally.  · There is mild concentric LVH, with moderate asymmetric septal hypertrophy; there is no evidence of obstruction. Left ventricular diastolic dysfunction is noted (grade I a w/high LAP) consistent with impaired relaxation.  · Left atrial cavity size is mildly dilated. Saline test results are negative.  · There is moderate calcification of the aortic valve.No significant aortic valve regurgitation is present. No hemodynamically significant aortic valve stenosis is present.            Imaging  Chest X-ray:   19  Study Result      PA AND LATERAL CHEST     CLINICAL HISTORY: Dyspnea. Hypertension.     Compared to the previous chest x-ray dated 2019.     The lungs appear well-expanded and are free of infiltrates. There are no  pleural effusions. The heart is normal in size. The thoracic aorta is  mildly ectatic.     IMPRESSIONS: No evidence of acute disease within the chest     This report was finalized on 2019 9:33 AM by Dr. Aleks Vences M.D.            CT of head  Study Result      CT SCAN HEAD WITHOUT CONTRAST     CLINICAL HISTORY: Confusion and delirium.     TECHNIQUE: CT scan of the head was obtained with 3 mm axial images. No  intravenous contrast was administered.     FINDINGS:     The ventricles, sulci, and cisterns are age appropriate. There  are  mild-to-moderate changes of chronic small vessel ischemic phenomena. The  basal ganglia and thalami are remarkable for prominent areas of  senescent mineralization involving the globus pallidus. The posterior  fossa structures are also incidentally remarkable for prominent areas of  mineralization involving the dentate nuclei and the cerebellar  hemispheric white matter. Atherosclerotic changes are seen within the  intracranial vasculature. When compared to the prior head CT as part of  the prior CT angiogram dated 08/09/2019, there is no significant  interval change.     IMPRESSION:     No CT evidence for acute intracranial pathology. Further evaluation  could be performed with MR imaging for more sensitive and specific  detection of intracranial pathology as clinically indicated.     These findings and recommendations were directly discussed with Lisa Lewis of the emergency room staff on 12/02/2019 at approximately 8:54  AM.           Radiation dose reduction techniques were utilized, including automated  exposure control and exposure modulation based on body size.     This report was finalized on 12/2/2019 1:32 PM by Dr. Woodrow Jimenez M.D.       Lab Review   Results from last 7 days   Lab Units 12/03/19  0438 12/02/19  0815   TROPONIN T ng/mL 0.018 0.024         Results from last 7 days   Lab Units 12/03/19  0438   SODIUM mmol/L 140   POTASSIUM mmol/L 3.2*   BUN mg/dL 45*   CREATININE mg/dL 1.35*   CALCIUM mg/dL 10.1*        Results from last 7 days   Lab Units 12/03/19  0438 12/02/19  0815   WBC 10*3/mm3 12.40* 8.43   HEMOGLOBIN g/dL 10.1* 11.6*   HEMATOCRIT % 28.7* 34.2   PLATELETS 10*3/mm3 242 316          I have reviewed the patient's recent medical history and current medications, as well as personally reviewed/interpreted the ECG/telemetry data    The following medical decision was discussed in detail with Dr. Huizar    Assessment:  1.  Complicated UTI  2.  Altered mental status  3.  Lactic  "acidosis  4.  Dehydration  5.  Elevated troponin  6.Hypertension  7.  Hyperlipidemia      Plan:  Blood pressure and heart rate elevated today, will increase BB.Troponin trending down, BNP 2576, with BLE edema. Will give lasix iv 20 mg once.K+ low, replacing. ECG shows sinus tach, heart rate 119.  Last lipid profile showed good control, now currently on statin.  Last echo on 8/8/19 showed EF 61%, see full report as above.  Will consider ischemic work-up when stable.    Labs/tests ordered for am: Increase BB, will give 1 dose of  IV Lasix, bmp, and mag in am.      12/3/2019  LAURIE Nava    Patient personally interviewed and above subjective findings personally confirmed during a face to face contact with patient today  All findings of physical examination confirmed  All pertinent and performed labs, cardiac procedures ,  radiographs of the last 24 hours personally reviewed  Impression and plans discussed/elaborated and implemented jointly as described above     Frank Huizar MD            EMR Dragon/Transcription:   \"Dictated utilizing Dragon dictation\".     "

## 2019-12-04 NOTE — PLAN OF CARE
Problem: Patient Care Overview  Goal: Plan of Care Review  Outcome: Ongoing (interventions implemented as appropriate)   12/04/19 0536   Coping/Psychosocial   Plan of Care Reviewed With patient   OTHER   Outcome Summary arrived on unit at 1945, unresponsive, medicated for pain/anxiety x1, also medicated for secretions x1, O2 at 2L cont, bernarda in place, will continue to monitor    Plan of Care Review   Progress declining      12/04/19 0536   Coping/Psychosocial   Plan of Care Reviewed With patient   OTHER   Outcome Summary arrived on unit at 1945, unresponsive, medicated for pain/anxiety x1, also medicated for secretions x1, O2 at 2L cont, bernarda in place, will continue to monitor    Plan of Care Review   Progress declining     Goal: Individualization and Mutuality  Outcome: Ongoing (interventions implemented as appropriate)   12/04/19 0536   Individualization   Patient Specific Preferences comfort care, pain control   Patient Specific Goals (Include Timeframe) comfort   Patient Specific Interventions IVF, prn meds for pain and anxiety, O2 at 2L, Bernarda, medicated for secretions   Mutuality/Individual Preferences   What Anxieties, Fears, Concerns, or Questions Do You Have About Your Care? none voiced   What Information Would Help Us Give You More Personalized Care? none voiced   How Would You and/or Your Support Person Like to Participate in Your Care? none voiced       Problem: Fall Risk (Adult)  Goal: Absence of Fall  Outcome: Ongoing (interventions implemented as appropriate)   12/04/19 0536   Fall Risk (Adult)   Absence of Fall making progress toward outcome       Problem: Palliative Care (Adult)  Goal: Identify Related Risk Factors and Signs and Symptoms  Outcome: Ongoing (interventions implemented as appropriate)   12/04/19 0536   Palliative Care (Adult)   Palliative Care: Related Risk Factors advanced age;chronic illness;condition is progressive;end-stage disease;terminal illness;worsening symptoms    Palliative Care: Signs and Symptoms anxiety;confusion;pain     Goal: Maximized Comfort  Outcome: Ongoing (interventions implemented as appropriate)   12/04/19 0536   Palliative Care (Adult)   Maximized Comfort making progress toward outcome     Goal: Enhanced Quality of Life  Outcome: Ongoing (interventions implemented as appropriate)   12/04/19 0536   Palliative Care (Adult)   Enhanced Quality of Life making progress toward outcome

## 2019-12-04 NOTE — CONSULTS
CONSULT NOTE    Infectious Diseases - Nicole Alonso MD  [facility]       Patient Identification:  Name: Maru Richey  Age: 89 y.o.  Sex: female  :  1925  MRN: [id number]             Date of Consultation: 12/3/2009      Primary Care Physician: Issac Navas MD                               Requesting Physician: Dr. Arrieta  Reason for Consultation: Sepsis and gram-negative bacteremia    Impression: 89-year-old female with complicated past medical history as noted below currently resides at [facility] and despite her comorbidities and age used to be functional and conversive and until the day of admission was being treated for urinary tract infection caused by Proteus species which was cultured in the urine on 2019.  Despite being on this medication patient was noted to be extremely lethargic in the morning of 2019 resulting in transfer to the emergency room where she was found to be septic with leukocytosis and elevated lactic acid level and significant alteration in mental status.  Patient was admitted and was started on IV Rocephin with IV fluids.  Today her blood culture come back positive for gram-negative rods resulting in infectious disease consultation.  In the interim care decision has been made for her to be placed in comfort and symptom management care structure with continuation of antibiotics.  This presentation is consistent with:  1-Toxic metabolic encephalopathy due to  2-Proteus/gram-negative bacteremic sepsis likely of  origin and very much concerning for obstructive  process such as hydro-pyelonephritis  3-history of hypertension  4-history of dyslipidemia and iron deficiency      Recommendations/Discussions: At this juncture she is appropriately transferred to the palliative care unit with her daughter at the bedside and after discussion it appears the expectation is to keep things simple with emphasis on dignified comfort care with  continuation of antibiotics.  Given her age and significant decline I think is not unreasonable and would recommend continuation of present treatment with strong emphasis towards further simplifying care by discontinuing antibiotics if she does not show any improvement.  Ideally if it would have been decided that she would need aggressive treatment she would at least benefit from CT scan of the abdomen pelvis to rule out obstructive  process for which if found intervention can offer some chance for improvement.  Again it is not guaranteed given where she is that in her life with high likelihood have very limited reserve to withstand the stress of surgery and ongoing sepsis.  Overall concept of care explained to the daughter.  Thank you Dr. Arrieta for letting me be the part of your patient care please see above impression and recommendation.  We will not follow this patient with you unless her care structure is changed or aggressive care is sought.      History of Present Illness:   89-year-old female with complicated past medical history as noted below currently resides at [facility] and despite her comorbidities and age used to be functional and conversive and until the day of admission was being treated for urinary tract infection caused by Proteus species which was cultured in the urine on 11/26/2019.  Despite being on this medication patient was noted to be extremely lethargic in the morning of 12/2/2019 resulting in transfer to the emergency room where she was found to be septic with leukocytosis and elevated lactic acid level and significant alteration in mental status.  Patient was admitted and was started on IV Rocephin with IV fluids.  Today her blood culture come back positive for gram-negative rods resulting in infectious disease consultation.  In the interim care decision has been made for her to be placed in comfort and symptom management care structure with continuation of  antibiotics.       Past Medical History:  Past Medical History:   Diagnosis Date   • Hyperlipidemia    • Hypertension    • Iron deficiency      Past Surgical History:  Past Surgical History:   Procedure Laterality Date   • APPENDECTOMY     • HYSTERECTOMY        Home Meds:  Medications Prior to Admission   Medication Sig Dispense Refill Last Dose   • Multiple Vitamins-Minerals (MULTIVITAMIN ADULT PO) Take 1 tablet by mouth Daily.      • acetaminophen (TYLENOL) 500 MG tablet Take 2 tablets by mouth Every 8 (Eight) Hours As Needed for Mild Pain  or Moderate Pain . 60 tablet 0    • ALPHAGAN P 0.1 % solution ophthalmic solution 1 drop 2 (Two) Times a Day.   10/7/2019 at Unknown time   • Elastic Bandages & Supports (MEDICAL COMPRESSION SOCKS) misc Compression socks due to edema in legs and ankles 2 each 1 10/7/2019 at Unknown time   • ferrous gluconate (FERGON) 240 (27 FE) MG tablet Take 240 mg by mouth 2 (Two) Times a Day.   10/7/2019 at Unknown time   • fluticasone (FLONASE) 50 MCG/ACT nasal spray 1 spray into the nostril(s) as directed by provider Daily As Needed for Rhinitis or Allergies.   Past Week at Unknown time   • ondansetron (ZOFRAN) 4 MG tablet Take 1 tablet by mouth Every 6 (Six) Hours As Needed for Nausea or Vomiting. 30 tablet 0      Current Meds:     Current Facility-Administered Medications:   •  acetaminophen (TYLENOL) tablet 650 mg, 650 mg, Oral, Q4H PRN **OR** acetaminophen (TYLENOL) 160 MG/5ML solution 650 mg, 650 mg, Oral, Q4H PRN **OR** acetaminophen (TYLENOL) suppository 650 mg, 650 mg, Rectal, Q4H PRN, Diego Douglass MD  •  [START ON 12/4/2019] amLODIPine (NORVASC) tablet 10 mg, 10 mg, Oral, Q24H, Diego Douglass MD  •  aspirin chewable tablet 81 mg, 81 mg, Oral, Daily, Diego Douglass MD  •  atorvastatin (LIPITOR) tablet 10 mg, 10 mg, Oral, Nightly, Diego Douglass MD  •  brimonidine (ALPHAGAN P) 0.1 % ophthalmic solution 1 drop, 1 drop, Both Eyes, BID, Diego Douglass MD, 1 drop at 12/03/19 1506  •   cefTRIAXone (ROCEPHIN) IVPB 2 g, 2 g, Intravenous, Q24H, Diego Douglass MD, Last Rate: 100 mL/hr at 12/03/19 1508, 2 g at 12/03/19 1508  •  diphenoxylate-atropine (LOMOTIL) 2.5-0.025 MG per tablet 1 tablet, 1 tablet, Oral, Q2H PRN, Diego Douglass MD  •  fluticasone (FLONASE) 50 MCG/ACT nasal spray 1 spray, 1 spray, Nasal, Daily PRN, Diego Douglass MD  •  Glycerin-Hypromellose- (ARTIFICIAL TEARS) 0.2-0.2-1 % ophthalmic solution solution 1 drop, 1 drop, Both Eyes, Q30 Min PRN, Diego Douglass MD  •  glycopyrrolate PF (ROBINUL) injection 0.2 mg, 0.2 mg, Intravenous, Q2H PRN **OR** glycopyrrolate PF (ROBINUL) injection 0.2 mg, 0.2 mg, Subcutaneous, Q2H PRN **OR** glycopyrrolate PF (ROBINUL) injection 0.4 mg, 0.4 mg, Intravenous, Q2H PRN **OR** glycopyrrolate PF (ROBINUL) injection 0.4 mg, 0.4 mg, Subcutaneous, Q2H PRN, Diego Douglass MD  •  HYDROmorphone (DILAUDID) injection 0.5 mg, 0.5 mg, Intravenous, Q1H PRN **OR** morphine injection 2 mg, 2 mg, Intravenous, Q1H PRN, 2 mg at 12/03/19 1859 **OR** morphine concentrated solution solution 5 mg, 5 mg, Sublingual, Q1H PRN, Diego Douglass MD  •  HYDROmorphone (DILAUDID) injection 1 mg, 1 mg, Intravenous, Q1H PRN **OR** morphine injection 4 mg, 4 mg, Intravenous, Q1H PRN **OR** morphine concentrated solution solution 10 mg, 10 mg, Sublingual, Q1H PRN, Diego Douglass MD  •  isosorbide dinitrate (ISORDIL) tablet 10 mg, 10 mg, Oral, TID - Nitrates, Diego Douglass MD, 10 mg at 12/03/19 1547  •  LORazepam (ATIVAN) injection 0.5 mg, 0.5 mg, Intravenous, Q1H PRN, 0.5 mg at 12/03/19 1719 **OR** LORazepam (ATIVAN) 2 MG/ML concentrated solution 0.5 mg, 0.5 mg, Sublingual, Q1H PRN, Diego Douglass MD  •  LORazepam (ATIVAN) injection 1 mg, 1 mg, Intravenous, Q1H PRN **OR** LORazepam (ATIVAN) 2 MG/ML concentrated solution 1 mg, 1 mg, Sublingual, Q1H PRN, Diego Douglass MD  •  LORazepam (ATIVAN) injection 2 mg, 2 mg, Intravenous, Q1H PRN **OR** LORazepam (ATIVAN) 2 MG/ML concentrated solution  "2 mg, 2 mg, Sublingual, Q1H PRN, Diego Douglass MD  •  magic mouthwash oral supsension 5 mL, 5 mL, Swish & Spit, Q4H PRN, Diego Douglass MD  •  metoprolol tartrate (LOPRESSOR) tablet 50 mg, 50 mg, Oral, Q12H, Tasia Peterson APRN  •  multivitamin with minerals 1 tablet, 1 tablet, Oral, Daily, Diego Douglass MD  •  nitroglycerin (NITROSTAT) SL tablet 0.4 mg, 0.4 mg, Sublingual, Q5 Min PRN, Diego Douglass MD  •  ondansetron (ZOFRAN) tablet 4 mg, 4 mg, Oral, Q6H PRN, Diego Douglass MD  •  pantoprazole (PROTONIX) EC tablet 40 mg, 40 mg, Oral, Q AM, Diego Douglass MD  •  promethazine (PHENERGAN) injection 6.25 mg, 6.25 mg, Intravenous, Q4H PRN **OR** promethazine (PHENERGAN) tablet 6.25 mg, 6.25 mg, Oral, Q4H PRN **OR** promethazine (PHENERGAN) 6.25 MG/5ML syrup 6.25 mg, 6.25 mg, Oral, Q4H PRN **OR** promethazine (PHENERGAN) suppository 6.25 mg, 6.25 mg, Rectal, Q4H PRN, Diego Douglass MD  •  sodium chloride 0.9 % with KCl 20 mEq/L infusion, 75 mL/hr, Intravenous, Continuous, Diego Douglass MD, Last Rate: 75 mL/hr at 12/03/19 1904, 75 mL/hr at 12/03/19 1904  Allergies:  No Known Allergies  Social History:   Social History     Tobacco Use   • Smoking status: Never Smoker   • Smokeless tobacco: Never Used   Substance Use Topics   • Alcohol use: No     Frequency: Never      Family History:  No family history on file.       Review of Systems  See history of present illness and past medical history.   Could not be obtained from the patient  Vitals:   /96 (BP Location: Right arm, Patient Position: Lying)   Pulse 108   Temp 97.9 °F (36.6 °C) (Oral)   Resp 18   Ht 162.6 cm (64.02\")   Wt 50 kg (110 lb 3.7 oz)   SpO2 93%   BMI 18.91 kg/m²   I/O:     Intake/Output Summary (Last 24 hours) at 12/3/2019 2203  Last data filed at 12/3/2019 0535  Gross per 24 hour   Intake --   Output 500 ml   Net -500 ml     Exam: Physical examination is very limited and involved only observation and visual assessment.  General Appearance:   " Significantly ill appearing lethargic somnolent nonresponding female.   Head:    Normocephalic, without obvious abnormality, atraumatic   Eyes:   Eyes closed   Ears:   Normal-appearing external ears   Nose:  Normal-appearing facies and morphology                                               Data Review:    I reviewed the patient's new clinical results.  Results from last 7 days   Lab Units 12/03/19  0438 12/02/19  0815   WBC 10*3/mm3 12.40* 8.43   HEMOGLOBIN g/dL 10.1* 11.6*   PLATELETS 10*3/mm3 242 316     Results from last 7 days   Lab Units 12/03/19  0438 12/02/19  0815   SODIUM mmol/L 140 149*   POTASSIUM mmol/L 3.2* 4.5   CHLORIDE mmol/L 107 109*   CO2 mmol/L 18.8* 22.7   BUN mg/dL 45* 46*   CREATININE mg/dL 1.35* 1.69*   CALCIUM mg/dL 10.1* 11.4*   GLUCOSE mg/dL 115* 144*     Microbiology Results (last 10 days)     Procedure Component Value - Date/Time    Blood Culture - Blood, Arm, Left [[id number]] Collected:  12/02/19 1137    Lab Status:  Preliminary result Specimen:  Blood from Arm, Left Updated:  12/03/19 1146     Blood Culture No growth at 24 hours    Blood Culture - Blood, Hand, Right [[id number]]  (Abnormal) Collected:  12/02/19 1029    Lab Status:  Preliminary result Specimen:  Blood from Hand, Right Updated:  12/03/19 0635     Blood Culture Abnormal Stain     Gram Stain Aerobic Bottle Gram negative bacilli    Blood Culture ID, PCR - Blood, Hand, Right [[id number]]  (Abnormal) Collected:  12/02/19 1029    Lab Status:  Final result Specimen:  Blood from Hand, Right Updated:  12/03/19 0748     BCID, PCR Proteus spp. Identification by BCID PCR.    Urine Culture - Urine, Urine, Catheter In/Out [[id number]]  (Abnormal) Collected:  12/02/19 0803    Lab Status:  Preliminary result Specimen:  Urine, Catheter In/Out Updated:  12/03/19 0939     Urine Culture >100,000 CFU/mL Gram Negative Bacilli        Ct Abdomen Pelvis Without Contrast    Result Date: 11/9/2019   1. The patient has a massive volume of fecal  material throughout the colon, characteristic of severe constipation/obstipation. There is no evidence of mechanical small bowel obstruction. 2. The patient has bilateral hydronephrosis. This is favored to be secondary to urinary retention, as there is distention of the urinary bladder. Patient may benefit from catheterization. 3. Distal esophagus appears patulous and fluid-filled. Correlation with any evidence of esophagitis is suggested.  Radiation dose reduction techniques were utilized, including automated exposure control and exposure modulation based on body size.  This report was finalized on 11/9/2019 1:15 AM by Dr. Kellie Dwyer M.D.      Ct Head Without Contrast    Result Date: 12/2/2019   No CT evidence for acute intracranial pathology. Further evaluation could be performed with MR imaging for more sensitive and specific detection of intracranial pathology as clinically indicated.  These findings and recommendations were directly discussed with Lisa Lewis of the emergency room staff on 12/02/2019 at approximately 8:54 AM.    Radiation dose reduction techniques were utilized, including automated exposure control and exposure modulation based on body size.  This report was finalized on 12/2/2019 1:32 PM by Dr. Woodrow Jimenez M.D.      Xr Abdomen Kub    Result Date: 11/11/2019  FINDINGS AND IMPRESSION: A tube overlies the rectum. Scoliosis is incompletely visualized lumbar spine.  There is a large amount of stool within the visualized colon, as before. There also appears to be mild dilation of small bowel loops measuring up to 4.6 cm in diameter which appears mildly progressed since 11/09/2019. Findings may represent ileus versus obstruction and correlation with patient history is recommended. Findings can be further evaluated with small bowel follow-through if clinically indicated.  This report was finalized on 11/11/2019 11:46 AM by Dr. Frankie Hidalgo M.D.          Assessment:  [facility]  Problems    Diagnosis  POA   • Complicated UTI (urinary tract infection) [N39.0]  Yes      Resolved Hospital Problems   No resolved problems to display.         Plan:  See above  Nicole Clifford MD   12/3/2019  10:03 PM    Much of this encounter note is an electronic transcription/translation of spoken language to printed text. The electronic translation of spoken language may permit erroneous, or at times, nonsensical words or phrases to be inadvertently transcribed; Although I have reviewed the note for such errors, some may still exist

## 2019-12-04 NOTE — CONSULTS
Patient transferred to the palliative care unit following goals of care and treatment preferences discussion with Dr. Douglass and Dr. Clifford.  Patient and/or family state goal of care to be comfort and treatment preferences to be geared toward symptom management and continuation of antibiotics for now.  The palliative care team will continue to follow for any further needs.

## 2019-12-04 NOTE — PROGRESS NOTES
"Daily progress note    Chief complaint  Comfortable  No new issues  Family at bedside    History of present illness  89-year-old female with history of hypertension hyperlipidemia and iron deficiency anemia who is a nursing home resident sent to the emergency room at [facility] with altered mental status.  Patient is sleepy lethargic.  Patient is in no respiratory distress and work-up in ER revealed dehydration and complicated UTI admit for management.  Patient has dementia and unable to give detailed history most of the history obtained from the chart  nursing staff and old record.  Patient has elevated troponin with no chest pain shortness of breath or palpitation.  Patient admitted for management.     REVIEW OF SYSTEMS  Unable to perform ROS: Mental status change       PHYSICAL EXAM   Blood pressure 107/68, pulse 101, temperature 97.6 °F (36.4 °C), temperature source Axillary, resp. rate 20, height 162.6 cm (64.02\"), weight 50 kg (110 lb 3.7 oz), SpO2 93 %.    GENERAL: not distressed  HENT: nares patent  EYES: no scleral icterus  CV: regular rhythm, regular rate  RESPIRATORY: normal effort  ABDOMEN: soft  MUSCULOSKELETAL: no deformity  NEURO: awake, alert, answers questions but speech is not discernible   SKIN: warm, dry, healing nickel-sized pressure sore over the coccyx     LAB RESULTS  Lab Results (last 24 hours)     Procedure Component Value Units Date/Time    Manual Differential [[id number]]  (Abnormal) Collected:  12/04/19 0551    Specimen:  Blood Updated:  12/04/19 0747     Neutrophil % 93.9 %      Lymphocyte % 4.0 %      Monocyte % 2.0 %      Neutrophils Absolute 11.50 10*3/mm3      Lymphocytes Absolute 0.49 10*3/mm3      Monocytes Absolute 0.25 10*3/mm3      Anisocytosis Slight/1+     Medora Cells Large/3+     Ovalocytes Mod/2+     Poikilocytes Large/3+     Polychromasia Slight/1+     WBC Morphology Normal     Platelet Morphology Normal    CBC & Differential [[id number]] Collected:  12/04/19 0551    Specimen:  " Blood Updated:  12/04/19 0747    Narrative:       The following orders were created for panel order CBC & Differential.  Procedure                               Abnormality         Status                     ---------                               -----------         ------                     CBC Auto Differential[[id number]]        Abnormal            Final result                 Please view results for these tests on the individual orders.    CBC Auto Differential [[id number]]  (Abnormal) Collected:  12/04/19 0551    Specimen:  Blood Updated:  12/04/19 0747     WBC 12.25 10*3/mm3      RBC 3.49 10*6/mm3      Hemoglobin 10.0 g/dL      Hematocrit 29.0 %      MCV 83.1 fL      MCH 28.7 pg      MCHC 34.5 g/dL      RDW 13.6 %      RDW-SD 40.5 fl      MPV 11.1 fL      Platelets 170 10*3/mm3     Basic Metabolic Panel [[id number]]  (Abnormal) Collected:  12/04/19 0551    Specimen:  Blood Updated:  12/04/19 0705     Glucose 83 mg/dL      BUN 49 mg/dL      Creatinine 1.10 mg/dL      Sodium 149 mmol/L      Potassium 3.8 mmol/L      Chloride 116 mmol/L      CO2 17.3 mmol/L      Calcium 9.8 mg/dL      eGFR   Amer 56 mL/min/1.73      BUN/Creatinine Ratio 44.5     Anion Gap 15.7 mmol/L     Narrative:       GFR Normal >60  Chronic Kidney Disease <60  Kidney Failure <15    Magnesium [[id number]]  (Normal) Collected:  12/04/19 0551    Specimen:  Blood Updated:  12/04/19 0705     Magnesium 2.3 mg/dL     Blood Culture - Blood, Hand, Right [[id number]]  (Abnormal) Collected:  12/02/19 1029    Specimen:  Blood from Hand, Right Updated:  12/04/19 0659     Blood Culture Proteus species     Isolated from Aerobic Bottle     Gram Stain Aerobic Bottle Gram negative bacilli    Blood Culture ID, PCR - Blood, Arm, Left [[id number]]  (Normal) Collected:  12/02/19 1137    Specimen:  Blood from Arm, Left Updated:  12/04/19 0456     BCID, PCR Negative by BCID PCR. Culture to Follow.    Urine Culture - Urine, Urine, Catheter In/Out [[id number]]   (Abnormal)  (Susceptibility) Collected:  12/02/19 0803    Specimen:  Urine, Catheter In/Out Updated:  12/04/19 0434     Urine Culture >100,000 CFU/mL Proteus mirabilis    Susceptibility      Proteus mirabilis     LEVI     Ampicillin Susceptible     Ampicillin + Sulbactam Susceptible     Cefazolin Susceptible     Cefepime Susceptible     Ceftazidime Susceptible     Ceftriaxone Susceptible     Gentamicin Susceptible     Levofloxacin Susceptible     Meropenem Susceptible     Nitrofurantoin Resistant     Piperacillin + Tazobactam Susceptible     Tetracycline Resistant     Trimethoprim + Sulfamethoxazole Susceptible                    T4, Free [[id number]]  (Normal) Collected:  12/03/19 1611    Specimen:  Blood Updated:  12/03/19 1657     Free T4 1.48 ng/dL     Blood Culture - Blood, Arm, Left [[id number]] Collected:  12/03/19 1612    Specimen:  Blood from Arm, Left Updated:  12/03/19 1620    Blood Culture - Blood, Arm, Left [[id number]] Collected:  12/03/19 1612    Specimen:  Blood from Arm, Left Updated:  12/03/19 1620        Imaging Results (Last 24 Hours)     ** No results found for the last 24 hours. **      ECG 12 Lead        HEART RATE= 108  bpm  RR Interval= 560  ms  WI Interval= 139  ms  P Horizontal Axis= 16  deg  P Front Axis= 66  deg  QRSD Interval= 82  ms  QT Interval= 328  ms  QRS Axis= 18  deg  T Wave Axis= 92  deg  - ABNORMAL ECG -  Sinus tachycardia  Atrial premature complex  Nonspecific T abnormalities, lateral leads  When compared with ECG of 11-Nov-2019 6:06:10,  No significant change                Current Facility-Administered Medications:   •  acetaminophen (TYLENOL) tablet 650 mg, 650 mg, Oral, Q4H PRN **OR** acetaminophen (TYLENOL) 160 MG/5ML solution 650 mg, 650 mg, Oral, Q4H PRN **OR** acetaminophen (TYLENOL) suppository 650 mg, 650 mg, Rectal, Q4H PRN, Diego Douglass MD  •  amLODIPine (NORVASC) tablet 10 mg, 10 mg, Oral, Q24H, Diego Douglass MD  •  aspirin chewable tablet 81 mg, 81 mg, Oral, Daily,  Diego Douglass MD  •  atorvastatin (LIPITOR) tablet 10 mg, 10 mg, Oral, Nightly, Diego Douglass MD  •  brimonidine (ALPHAGAN P) 0.1 % ophthalmic solution 1 drop, 1 drop, Both Eyes, BID, Diego Douglass MD, 1 drop at 12/03/19 1506  •  cefTRIAXone (ROCEPHIN) IVPB 2 g, 2 g, Intravenous, Q24H, Diego Douglass MD, Last Rate: 100 mL/hr at 12/03/19 1508, 2 g at 12/03/19 1508  •  diphenoxylate-atropine (LOMOTIL) 2.5-0.025 MG per tablet 1 tablet, 1 tablet, Oral, Q2H PRN, Diego Douglass MD  •  fluticasone (FLONASE) 50 MCG/ACT nasal spray 1 spray, 1 spray, Nasal, Daily PRN, Diego Douglass MD  •  Glycerin-Hypromellose- (ARTIFICIAL TEARS) 0.2-0.2-1 % ophthalmic solution solution 1 drop, 1 drop, Both Eyes, Q30 Min PRN, Diego Douglass MD  •  glycopyrrolate PF (ROBINUL) injection 0.2 mg, 0.2 mg, Intravenous, Q2H PRN, 0.2 mg at 12/04/19 0902 **OR** glycopyrrolate PF (ROBINUL) injection 0.2 mg, 0.2 mg, Subcutaneous, Q2H PRN **OR** glycopyrrolate PF (ROBINUL) injection 0.4 mg, 0.4 mg, Intravenous, Q2H PRN **OR** glycopyrrolate PF (ROBINUL) injection 0.4 mg, 0.4 mg, Subcutaneous, Q2H PRN, Diego Douglass MD  •  HYDROmorphone (DILAUDID) injection 0.5 mg, 0.5 mg, Intravenous, Q1H PRN **OR** morphine injection 2 mg, 2 mg, Intravenous, Q1H PRN, 2 mg at 12/04/19 0902 **OR** morphine concentrated solution solution 5 mg, 5 mg, Sublingual, Q1H PRN, Diego Douglass MD  •  HYDROmorphone (DILAUDID) injection 1 mg, 1 mg, Intravenous, Q1H PRN **OR** morphine injection 4 mg, 4 mg, Intravenous, Q1H PRN **OR** morphine concentrated solution solution 10 mg, 10 mg, Sublingual, Q1H PRN, Diego Douglass MD  •  isosorbide dinitrate (ISORDIL) tablet 10 mg, 10 mg, Oral, TID - Nitrates, Diego Douglass MD, 10 mg at 12/03/19 1547  •  LORazepam (ATIVAN) injection 0.5 mg, 0.5 mg, Intravenous, Q1H PRN, 0.5 mg at 12/03/19 1719 **OR** LORazepam (ATIVAN) 2 MG/ML concentrated solution 0.5 mg, 0.5 mg, Sublingual, Q1H PRN, Diego Douglass MD  •  LORazepam (ATIVAN) injection 1 mg,  1 mg, Intravenous, Q1H PRN, 1 mg at 12/04/19 0901 **OR** LORazepam (ATIVAN) 2 MG/ML concentrated solution 1 mg, 1 mg, Sublingual, Q1H PRN, Lydia Sheikh MD  •  LORazepam (ATIVAN) injection 2 mg, 2 mg, Intravenous, Q1H PRN **OR** LORazepam (ATIVAN) 2 MG/ML concentrated solution 2 mg, 2 mg, Sublingual, Q1H PRN, Lydia Shekih MD  •  magic mouthwash oral supsension 5 mL, 5 mL, Swish & Spit, Q4H PRN, Lydia Sheikh MD  •  metoprolol tartrate (LOPRESSOR) tablet 50 mg, 50 mg, Oral, Q12H, Tasia Peterson APRN  •  multivitamin with minerals 1 tablet, 1 tablet, Oral, Daily, Lydia Sheikh MD  •  nitroglycerin (NITROSTAT) SL tablet 0.4 mg, 0.4 mg, Sublingual, Q5 Min PRN, Lydia Sheikh MD  •  pantoprazole (PROTONIX) EC tablet 40 mg, 40 mg, Oral, Q AM, Lydia Sheikh MD  •  promethazine (PHENERGAN) injection 6.25 mg, 6.25 mg, Intravenous, Q4H PRN **OR** promethazine (PHENERGAN) tablet 6.25 mg, 6.25 mg, Oral, Q4H PRN **OR** promethazine (PHENERGAN) 6.25 MG/5ML syrup 6.25 mg, 6.25 mg, Oral, Q4H PRN **OR** promethazine (PHENERGAN) suppository 6.25 mg, 6.25 mg, Rectal, Q4H PRN, Lydia Sheikh MD  •  sodium chloride 0.9 % with KCl 20 mEq/L infusion, 75 mL/hr, Intravenous, Continuous, Lydia Sheikh MD, Last Rate: 75 mL/hr at 12/03/19 1904, 75 mL/hr at 12/03/19 1904     ASSESSMENT  Acute UTI with sepsis  Proteus bacteremia with sepsis  Dehydration  Elevated troponin   Dementia  Hypertension   Hyperlipidemia   Gastroesophageal for disease     PLAN  Patient and family agree for comfort care with routine palliative care orders and allow natural death.  We will comply with patient and family wishes.    LYDIA SHEIKH MD

## 2019-12-04 NOTE — DISCHARGE PLACEMENT REQUEST
"Maru Richey (89 y.o. Female)     Date of Birth Social Security Number Address Home Phone MRN    02/21/1925  [id number] [address] [phone] [id number]    Church Marital Status          [employer] Single       Admission Date Admission Type Admitting Provider Attending Provider Department, Room/Bed    12/2/19 Emergency Diego Douglass MD Ahmed, Aftab, MD [address], P477/1    Discharge Date Discharge Disposition Discharge Destination                       Attending Provider:  Diego Douglass MD    Allergies:  No Known Allergies    Isolation:  None   Infection:  None   Code Status:  No CPR    Ht:  162.6 cm (64.02\")   Wt:  50 kg (110 lb 3.7 oz)    Admission Cmt:  None   Principal Problem:  None                Active Insurance as of 12/2/2019     Primary Coverage     Payor Plan Insurance Group Employer/Plan Group    MEDICARE MEDICARE A & B      Payor Plan Address Payor Plan Phone Number Payor Plan Fax Number Effective Dates    [address] [phone]  2/1/1990 - None Entered    [address] [address]       Subscriber Name Subscriber Birth Date Member ID       MARU RICHEY 2/21/1925 [id number]                 Emergency Contacts      (Rel.) Home Phone Work Phone Mobile Phone    Lisa Hooks (Daughter) [phone] -- --              "

## 2019-12-04 NOTE — PROGRESS NOTES
Continued Stay Note  [facility]     Patient Name: Maru Richey  MRN: [id number]  Today's Date: 12/4/2019    Admit Date: 12/2/2019    Discharge Plan     Row Name 12/04/19 7971       Plan    Plan  Palliative/comfort care     Plan Comments  CCP spoke with patient's daughter on the phone (Lisa [phone]) HCS on file. Face sheet verified. IMM given and placed copy at bedside. Patient is from SNF at [facility]. CCP will follow and assist as needed. Cristina TEAGUE         Discharge Codes    No documentation.             HO Reyes

## 2019-12-04 NOTE — PROGRESS NOTES
[facility]  Cardiology Progress Note    Patient Identification:  Name: Maru Richey  Age: 89 y.o.  Sex: female  :  1925  MRN: [id number]                 Follow Up / Chief Complaint: Elevated troponin    Interval History:  Patient now palliative/comfort measures only     Subjective: unable to obtain, patient unresponsive      Objective:    Past Medical History:  Past Medical History:   Diagnosis Date   • Hyperlipidemia    • Hypertension    • Iron deficiency      Past Surgical History:  Past Surgical History:   Procedure Laterality Date   • APPENDECTOMY     • HYSTERECTOMY          Social History:   Social History     Tobacco Use   • Smoking status: Never Smoker   • Smokeless tobacco: Never Used   Substance Use Topics   • Alcohol use: No     Frequency: Never      Family History:  No family history on file.       Allergies:  No Known Allergies  Scheduled Meds:         I have reviewed the patient's recent medical history and current medications, as well as personally reviewed/interpreted the ECG/telemetry data    INTAKE AND OUTPUT:    Intake/Output Summary (Last 24 hours) at 2019 1643  Last data filed at 2019 1110  Gross per 24 hour   Intake --   Output 1550 ml   Net -1550 ml     Review of systems unable to obtain, patient unresponsive  GI:  CV:  Pulm:      Physical Exam:   General:  Appears frail, but in no acute distress  Eyes: PERTL,  HEENT:  No JVD. Thyroid not visibly enlarged. No mucosal pallor or cyanosis  Respiratory: Respirations regular and unlabored at rest. BBS with good air entry anteriorly and laterally. No crackles, rubs or wheezes auscultated  Cardiovascular: S1S2 Regular rate and rhythm. No murmur, rub or gallop. No carotid bruits. DP/PT pulses 2+. No pretibial pitting edema  Gastrointestinal: Abdomen soft, flat, non tender. Bowel sounds present.  No ascites  Musculoskeletal: No movement noted on exam  Extremities: No digital clubbing or cyanosis  Skin: Color pink. Skin  warm and dry to touch. No rashes    Neuro: unable to fully assess, patient opens eyes-but not to command; does not follow direction or communicate meaningfully  Psych: no family at bedside; unable to communicate effectively at this time.         Cardiographics  Telemetry:     EC/3/19 sinus tach with PVCs rate 110s      19 sinus tachycardia       19 sinus rhythm          Echocardiogram:   19  Interpretation Summary      · Left ventricular systolic function is normal. Calculated EF = 61.0%. Estimated EF was in agreement with the calculated EF. Normal left ventricular cavity size noted. All left ventricular wall segments contract normally.  · There is mild concentric LVH, with moderate asymmetric septal hypertrophy; there is no evidence of obstruction. Left ventricular diastolic dysfunction is noted (grade I a w/high LAP) consistent with impaired relaxation.  · Left atrial cavity size is mildly dilated. Saline test results are negative.  · There is moderate calcification of the aortic valve.No significant aortic valve regurgitation is present. No hemodynamically significant aortic valve stenosis is present.            Imaging  Chest X-ray:   19  Study Result      PA AND LATERAL CHEST     CLINICAL HISTORY: Dyspnea. Hypertension.     Compared to the previous chest x-ray dated 2019.     The lungs appear well-expanded and are free of infiltrates. There are no  pleural effusions. The heart is normal in size. The thoracic aorta is  mildly ectatic.     IMPRESSIONS: No evidence of acute disease within the chest     This report was finalized on 2019 9:33 AM by Dr. Aleks Vences M.D.            CT of head  Study Result      CT SCAN HEAD WITHOUT CONTRAST     CLINICAL HISTORY: Confusion and delirium.     TECHNIQUE: CT scan of the head was obtained with 3 mm axial images. No  intravenous contrast was administered.     FINDINGS:     The ventricles, sulci, and cisterns are age appropriate.  There are  mild-to-moderate changes of chronic small vessel ischemic phenomena. The  basal ganglia and thalami are remarkable for prominent areas of  senescent mineralization involving the globus pallidus. The posterior  fossa structures are also incidentally remarkable for prominent areas of  mineralization involving the dentate nuclei and the cerebellar  hemispheric white matter. Atherosclerotic changes are seen within the  intracranial vasculature. When compared to the prior head CT as part of  the prior CT angiogram dated 08/09/2019, there is no significant  interval change.     IMPRESSION:     No CT evidence for acute intracranial pathology. Further evaluation  could be performed with MR imaging for more sensitive and specific  detection of intracranial pathology as clinically indicated.     These findings and recommendations were directly discussed with Lisa Lewis of the emergency room staff on 12/02/2019 at approximately 8:54  AM.           Radiation dose reduction techniques were utilized, including automated  exposure control and exposure modulation based on body size.     This report was finalized on 12/2/2019 1:32 PM by Dr. Woodrow Jimenez M.D.       Lab Review   Results from last 7 days   Lab Units 12/03/19  0438 12/02/19  0815   TROPONIN T ng/mL 0.018 0.024     Results from last 7 days   Lab Units 12/04/19  0551   MAGNESIUM mg/dL 2.3     Results from last 7 days   Lab Units 12/04/19  0551   SODIUM mmol/L 149*   POTASSIUM mmol/L 3.8   BUN mg/dL 49*   CREATININE mg/dL 1.10*   CALCIUM mg/dL 9.8*        Results from last 7 days   Lab Units 12/04/19  0551 12/03/19  0438 12/02/19  0815   WBC 10*3/mm3 12.25* 12.40* 8.43   HEMOGLOBIN g/dL 10.0* 10.1* 11.6*   HEMATOCRIT % 29.0* 28.7* 34.2   PLATELETS 10*3/mm3 170 242 316     Estimated Creatinine Clearance: 24.7 mL/min (A) (by C-G formula based on SCr of 1.1 mg/dL (H)).    I have reviewed the medical decision making with Dr. Huizar in detail.  "    Assessment:  1.  Complicated UTI  2.  Altered mental status  3.  Lactic acidosis  4.  Dehydration  5.  Elevated troponin  6.  Hypertension  7.  Hyperlipidemia  8.  Leukocytosis   9.  Anemia  10.  Hypokalemia    Plan:  BP now normalized.  Patient comfort measures only at this point.  Potassium now normalized, electrolytes stable.  Hgb stable as well.  Patient remains tachycardic in the low 100s.  Leukocytosis stable.  No further CV workup recommended at this time.  Will sign off for now.  We appreciate the opportunity to participate in this patient's care, and would be happy to be of further service should we be needed.        12/4/2019  Sarah Shoemaker, LAURIE    EMR Michelle/Transcription:   \"Dictated utilizing Dragon dictation\".     "

## 2019-12-04 NOTE — CONSULTS
Spiritual Care:  Pt sleeping, no family present, read scripture, sang and prayed.  Chaplaincy services available to offer pastoral care and assessment as needed.   Kenji Haynes

## 2019-12-05 NOTE — PROGRESS NOTES
"Daily progress note    Chief complaint  Comfortable  No new issues  Family at bedside    History of present illness  89-year-old female with history of hypertension hyperlipidemia and iron deficiency anemia who is a nursing home resident sent to the emergency room at [facility] with altered mental status.  Patient is sleepy lethargic.  Patient is in no respiratory distress and work-up in ER revealed dehydration and complicated UTI admit for management.  Patient has dementia and unable to give detailed history most of the history obtained from the chart  nursing staff and old record.  Patient has elevated troponin with no chest pain shortness of breath or palpitation.  Patient admitted for management.     REVIEW OF SYSTEMS  Unable to perform ROS: Mental status change       PHYSICAL EXAM   Blood pressure 124/63, pulse (!) 126, temperature 97.8 °F (36.6 °C), temperature source Oral, resp. rate 26, height 162.6 cm (64.02\"), weight 50 kg (110 lb 3.7 oz), SpO2 93 %.    GENERAL: not distressed  HENT: nares patent  EYES: no scleral icterus  CV: regular rhythm, regular rate  RESPIRATORY: normal effort  ABDOMEN: soft  MUSCULOSKELETAL: no deformity  NEURO: awake, alert, answers questions but speech is not discernible   SKIN: warm, dry, healing nickel-sized pressure sore over the coccyx     LAB RESULTS  Lab Results (last 24 hours)     Procedure Component Value Units Date/Time    Blood Culture - Blood, Arm, Left [[id number]]  (Abnormal) Collected:  12/02/19 1137    Specimen:  Blood from Arm, Left Updated:  12/05/19 0642     Blood Culture Staphylococcus, coagulase negative     Comment: Probable contaminant requires clinical correlation, susceptibility not performed unless requested by physician.            Isolated from Aerobic Bottle     Gram Stain Aerobic Bottle Gram positive cocci    Blood Culture - Blood, Hand, Right [[id number]]  (Abnormal)  (Susceptibility) Collected:  12/02/19 1029    Specimen:  Blood from Hand, Right Updated:  " 12/05/19 0617     Blood Culture Proteus mirabilis     Isolated from Aerobic Bottle     Gram Stain Aerobic Bottle Gram negative bacilli    Susceptibility      Proteus mirabilis     LEVI     Ampicillin Susceptible     Ampicillin + Sulbactam Susceptible     Cefepime Susceptible     Ceftazidime Susceptible     Ceftriaxone Susceptible     Gentamicin Susceptible     Levofloxacin Susceptible     Meropenem Susceptible     Piperacillin + Tazobactam Susceptible     Trimethoprim + Sulfamethoxazole Susceptible                Susceptibility Comments     Proteus mirabilis    Cefazolin sensitivity will not be reported for Enterobacteriaceae in non-urine isolates. If cefazolin is preferred, please call the microbiology lab to request an E-test.             Blood Culture - Blood, Arm, Left [[id number]] Collected:  12/03/19 1612    Specimen:  Blood from Arm, Left Updated:  12/04/19 1631     Blood Culture No growth at 24 hours    Blood Culture - Blood, Arm, Left [[id number]] Collected:  12/03/19 1612    Specimen:  Blood from Arm, Left Updated:  12/04/19 1631     Blood Culture No growth at 24 hours        Imaging Results (Last 24 Hours)     ** No results found for the last 24 hours. **      ECG 12 Lead        HEART RATE= 108  bpm  RR Interval= 560  ms  [place] Interval= 139  ms  P Horizontal Axis= 16  deg  P Front Axis= 66  deg  QRSD Interval= 82  ms  QT Interval= 328  ms  QRS Axis= 18  deg  T Wave Axis= 92  deg  - ABNORMAL ECG -  Sinus tachycardia  Atrial premature complex  Nonspecific T abnormalities, lateral leads  When compared with ECG of 11-Nov-2019 6:06:10,  No significant change                Current Facility-Administered Medications:   •  acetaminophen (TYLENOL) tablet 650 mg, 650 mg, Oral, Q4H PRN **OR** acetaminophen (TYLENOL) 160 MG/5ML solution 650 mg, 650 mg, Oral, Q4H PRN **OR** acetaminophen (TYLENOL) suppository 650 mg, 650 mg, Rectal, Q4H PRN, Diego Douglass MD  •  diphenoxylate-atropine (LOMOTIL) 2.5-0.025 MG per tablet 1  tablet, 1 tablet, Oral, Q2H PRN, Diego Douglass MD  •  fluticasone (FLONASE) 50 MCG/ACT nasal spray 1 spray, 1 spray, Nasal, Daily PRN, Diego Douglass MD  •  Glycerin-Hypromellose- (ARTIFICIAL TEARS) 0.2-0.2-1 % ophthalmic solution solution 1 drop, 1 drop, Both Eyes, Q30 Min PRN, Diego Douglass MD  •  glycopyrrolate PF (ROBINUL) injection 0.2 mg, 0.2 mg, Intravenous, Q2H PRN, 0.2 mg at 12/04/19 0902 **OR** glycopyrrolate PF (ROBINUL) injection 0.2 mg, 0.2 mg, Subcutaneous, Q2H PRN **OR** glycopyrrolate PF (ROBINUL) injection 0.4 mg, 0.4 mg, Intravenous, Q2H PRN **OR** glycopyrrolate PF (ROBINUL) injection 0.4 mg, 0.4 mg, Subcutaneous, Q2H PRN, Diego Douglass MD  •  HYDROmorphone (DILAUDID) injection 0.5 mg, 0.5 mg, Intravenous, Q1H PRN **OR** morphine injection 2 mg, 2 mg, Intravenous, Q1H PRN, 2 mg at 12/05/19 1343 **OR** morphine concentrated solution solution 5 mg, 5 mg, Sublingual, Q1H PRN, Diego Douglass MD  •  HYDROmorphone (DILAUDID) injection 1 mg, 1 mg, Intravenous, Q1H PRN **OR** morphine injection 4 mg, 4 mg, Intravenous, Q1H PRN **OR** morphine concentrated solution solution 10 mg, 10 mg, Sublingual, Q1H PRN, Diego Douglass MD  •  LORazepam (ATIVAN) injection 0.5 mg, 0.5 mg, Intravenous, Q1H PRN, 0.5 mg at 12/05/19 0811 **OR** LORazepam (ATIVAN) 2 MG/ML concentrated solution 0.5 mg, 0.5 mg, Sublingual, Q1H PRN, Diego Douglass MD  •  LORazepam (ATIVAN) injection 1 mg, 1 mg, Intravenous, Q1H PRN, 1 mg at 12/05/19 1343 **OR** LORazepam (ATIVAN) 2 MG/ML concentrated solution 1 mg, 1 mg, Sublingual, Q1H PRN, Diego Douglass MD  •  LORazepam (ATIVAN) injection 2 mg, 2 mg, Intravenous, Q1H PRN **OR** LORazepam (ATIVAN) 2 MG/ML concentrated solution 2 mg, 2 mg, Sublingual, Q1H PRN, Diego Douglass MD  •  magic mouthwash oral supsension 5 mL, 5 mL, Swish & Spit, Q4H PRN, Diego Douglass MD  •  nitroglycerin (NITROSTAT) SL tablet 0.4 mg, 0.4 mg, Sublingual, Q5 Min PRN, Diego Douglass MD  •  promethazine (PHENERGAN)  injection 6.25 mg, 6.25 mg, Intravenous, Q4H PRN **OR** promethazine (PHENERGAN) tablet 6.25 mg, 6.25 mg, Oral, Q4H PRN **OR** promethazine (PHENERGAN) 6.25 MG/5ML syrup 6.25 mg, 6.25 mg, Oral, Q4H PRN **OR** promethazine (PHENERGAN) suppository 6.25 mg, 6.25 mg, Rectal, Q4H PRN, Lydia Douglass MD     ASSESSMENT  Acute UTI with sepsis  Proteus bacteremia with sepsis  Dehydration  Elevated troponin   Dementia  Hypertension   Hyperlipidemia   Gastroesophageal for disease     PLAN  Comfort care only  Allow natural death  Routine palliative care orders  Discussed with family    LYDIA DOUGLASS MD

## 2019-12-05 NOTE — PLAN OF CARE
Problem: Patient Care Overview  Goal: Plan of Care Review  Outcome: Ongoing (interventions implemented as appropriate)   12/05/19 7508   Coping/Psychosocial   Plan of Care Reviewed With patient   OTHER   Outcome Summary Pt. continued with palliative care. Premedicate for activity and repositioning with ativan and morphine. New IV started today. Daughter called for an update today. Will continue to monitor.   Plan of Care Review   Progress declining     Goal: Individualization and Mutuality  Outcome: Ongoing (interventions implemented as appropriate)    Goal: Discharge Needs Assessment  Outcome: Ongoing (interventions implemented as appropriate)      Problem: Palliative Care (Adult)  Goal: Maximized Comfort  Outcome: Ongoing (interventions implemented as appropriate)    Goal: Enhanced Quality of Life  Outcome: Ongoing (interventions implemented as appropriate)

## 2019-12-06 NOTE — PLAN OF CARE
Problem: Patient Care Overview  Goal: Plan of Care Review  Outcome: Ongoing (interventions implemented as appropriate)   12/06/19 0411   Coping/Psychosocial   Plan of Care Reviewed With patient;daughter   OTHER   Outcome Summary Pt remains minimally responsive this shift. Premedicated prior to turns. Plan of care reviewed with daughter at bedside. Pt appears comfortable at this time. Will continue to monitor per comfort care.    Plan of Care Review   Progress declining     Goal: Individualization and Mutuality  Outcome: Ongoing (interventions implemented as appropriate)    Goal: Discharge Needs Assessment  Outcome: Ongoing (interventions implemented as appropriate)    Goal: Interprofessional Rounds/Family Conf  Outcome: Ongoing (interventions implemented as appropriate)      Problem: Palliative Care (Adult)  Goal: Identify Related Risk Factors and Signs and Symptoms  Outcome: Ongoing (interventions implemented as appropriate)    Goal: Maximized Comfort  Outcome: Ongoing (interventions implemented as appropriate)    Goal: Enhanced Quality of Life  Outcome: Ongoing (interventions implemented as appropriate)

## 2019-12-06 NOTE — PROGRESS NOTES
Continued Stay Note  [facility]     Patient Name: Maru Richey  MRN: [id number]  Today's Date: 12/6/2019    Admit Date: 12/2/2019    Discharge Plan     Row Name 12/06/19 1002       Plan    Plan  Palliative patient.    Plan Comments  Palliative patient.  Follow for patient's prognosis and [facility] eval which is pending.  HO Osullivan        Discharge Codes    No documentation.             HO Barrera

## 2019-12-06 NOTE — PLAN OF CARE
Problem: Patient Care Overview  Goal: Plan of Care Review  Outcome: Ongoing (interventions implemented as appropriate)   12/06/19 0967   Coping/Psychosocial   Plan of Care Reviewed With patient   OTHER   Outcome Summary Pt. continued with palliative care. Premedicated for turns with morphine and ativan. Will continue to monitor.   Plan of Care Review   Progress declining     Goal: Individualization and Mutuality  Outcome: Ongoing (interventions implemented as appropriate)    Goal: Discharge Needs Assessment  Outcome: Ongoing (interventions implemented as appropriate)      Problem: Palliative Care (Adult)  Goal: Maximized Comfort  Outcome: Ongoing (interventions implemented as appropriate)    Goal: Enhanced Quality of Life  Outcome: Ongoing (interventions implemented as appropriate)

## 2019-12-06 NOTE — PROGRESS NOTES
"Daily progress note    Chief complaint  Comfortable  No new issues  Family at bedside    History of present illness  89-year-old female with history of hypertension hyperlipidemia and iron deficiency anemia who is a nursing home resident sent to the emergency room at [facility] with altered mental status.  Patient is sleepy lethargic.  Patient is in no respiratory distress and work-up in ER revealed dehydration and complicated UTI admit for management.  Patient has dementia and unable to give detailed history most of the history obtained from the chart  nursing staff and old record.  Patient has elevated troponin with no chest pain shortness of breath or palpitation.  Patient admitted for management.     REVIEW OF SYSTEMS  Unable to perform ROS: Mental status change       PHYSICAL EXAM   Blood pressure (!) 74/43, pulse 103, temperature 96.9 °F (36.1 °C), temperature source Oral, resp. rate 14, height 162.6 cm (64.02\"), weight 50 kg (110 lb 3.7 oz), SpO2 100 %.    GENERAL: not distressed  HENT: nares patent  EYES: no scleral icterus  CV: regular rhythm, regular rate  RESPIRATORY: normal effort  ABDOMEN: soft  MUSCULOSKELETAL: no deformity  NEURO: awake, alert, answers questions but speech is not discernible   SKIN: warm, dry, healing nickel-sized pressure sore over the coccyx     LAB RESULTS  Lab Results (last 24 hours)     Procedure Component Value Units Date/Time    Blood Culture - Blood, Arm, Left [[id number]] Collected:  12/03/19 1612    Specimen:  Blood from Arm, Left Updated:  12/05/19 1630     Blood Culture No growth at 2 days    Blood Culture - Blood, Arm, Left [[id number]] Collected:  12/03/19 1612    Specimen:  Blood from Arm, Left Updated:  12/05/19 1630     Blood Culture No growth at 2 days        Imaging Results (Last 24 Hours)     ** No results found for the last 24 hours. **      ECG 12 Lead        HEART RATE= 108  bpm  RR Interval= 560  ms  SC Interval= 139  ms  P Horizontal Axis= 16  deg  P Front Axis= 66 "  deg  QRSD Interval= 82  ms  QT Interval= 328  ms  QRS Axis= 18  deg  T Wave Axis= 92  deg  - ABNORMAL ECG -  Sinus tachycardia  Atrial premature complex  Nonspecific T abnormalities, lateral leads  When compared with ECG of 11-Nov-2019 6:06:10,  No significant change                Current Facility-Administered Medications:   •  acetaminophen (TYLENOL) tablet 650 mg, 650 mg, Oral, Q4H PRN **OR** acetaminophen (TYLENOL) 160 MG/5ML solution 650 mg, 650 mg, Oral, Q4H PRN **OR** acetaminophen (TYLENOL) suppository 650 mg, 650 mg, Rectal, Q4H PRN, Diego Douglass MD  •  diphenoxylate-atropine (LOMOTIL) 2.5-0.025 MG per tablet 1 tablet, 1 tablet, Oral, Q2H PRN, Diego Douglass MD  •  fluticasone (FLONASE) 50 MCG/ACT nasal spray 1 spray, 1 spray, Nasal, Daily PRN, Diego Douglass MD  •  Glycerin-Hypromellose- (ARTIFICIAL TEARS) 0.2-0.2-1 % ophthalmic solution solution 1 drop, 1 drop, Both Eyes, Q30 Min PRN, Diego Douglass MD  •  glycopyrrolate PF (ROBINUL) injection 0.2 mg, 0.2 mg, Intravenous, Q2H PRN, 0.2 mg at 12/04/19 0902 **OR** glycopyrrolate PF (ROBINUL) injection 0.2 mg, 0.2 mg, Subcutaneous, Q2H PRN **OR** glycopyrrolate PF (ROBINUL) injection 0.4 mg, 0.4 mg, Intravenous, Q2H PRN **OR** glycopyrrolate PF (ROBINUL) injection 0.4 mg, 0.4 mg, Subcutaneous, Q2H PRN, Diego Douglass MD  •  HYDROmorphone (DILAUDID) injection 0.5 mg, 0.5 mg, Intravenous, Q1H PRN **OR** morphine injection 2 mg, 2 mg, Intravenous, Q1H PRN, 2 mg at 12/06/19 0915 **OR** morphine concentrated solution solution 5 mg, 5 mg, Sublingual, Q1H PRN, Diego Douglass MD  •  HYDROmorphone (DILAUDID) injection 1 mg, 1 mg, Intravenous, Q1H PRN **OR** morphine injection 4 mg, 4 mg, Intravenous, Q1H PRN **OR** morphine concentrated solution solution 10 mg, 10 mg, Sublingual, Q1H PRN, Diego Douglass MD  •  LORazepam (ATIVAN) injection 0.5 mg, 0.5 mg, Intravenous, Q1H PRN, 0.5 mg at 12/05/19 0811 **OR** LORazepam (ATIVAN) 2 MG/ML concentrated solution 0.5 mg,  0.5 mg, Sublingual, Q1H PRN, Lydia Douglass MD  •  LORazepam (ATIVAN) injection 1 mg, 1 mg, Intravenous, Q1H PRN, 1 mg at 12/06/19 0915 **OR** LORazepam (ATIVAN) 2 MG/ML concentrated solution 1 mg, 1 mg, Sublingual, Q1H PRN, Lydia Douglass MD  •  LORazepam (ATIVAN) injection 2 mg, 2 mg, Intravenous, Q1H PRN **OR** LORazepam (ATIVAN) 2 MG/ML concentrated solution 2 mg, 2 mg, Sublingual, Q1H PRN, Lydia Douglass MD  •  magic mouthwash oral supsension 5 mL, 5 mL, Swish & Spit, Q4H PRN, Lydia Douglass MD  •  promethazine (PHENERGAN) injection 6.25 mg, 6.25 mg, Intravenous, Q4H PRN **OR** promethazine (PHENERGAN) tablet 6.25 mg, 6.25 mg, Oral, Q4H PRN **OR** promethazine (PHENERGAN) 6.25 MG/5ML syrup 6.25 mg, 6.25 mg, Oral, Q4H PRN **OR** promethazine (PHENERGAN) suppository 6.25 mg, 6.25 mg, Rectal, Q4H PRN, Lydia Douglass MD     ASSESSMENT  Acute UTI with sepsis  Proteus bacteremia with sepsis  Dehydration  Elevated troponin   Dementia  Hypertension   Hyperlipidemia   Gastroesophageal for disease     PLAN  Comfort care only  Allow natural death  Routine palliative care orders  Discussed with family    LYDIA DOUGLASS MD

## 2019-12-07 NOTE — PLAN OF CARE
Problem: Dying Patient, Actively (Adult)  Goal: Identify Related Risk Factors and Signs and Symptoms  Outcome: Ongoing (interventions implemented as appropriate)  Flowsheets (Taken 12/7/2019 0500)  Related Risk Factors (Actively Dying Patient): disease progression;dyspnea;intractable pain  Signs and Symptoms (Actively Dying Patient): total dependency;vital sign changes     Problem: Dying Patient, Actively (Adult)  Goal: Comfort/Pain Control  Outcome: Ongoing (interventions implemented as appropriate)  Flowsheets (Taken 12/7/2019 0500)  Comfort/Pain Control: making progress toward outcome     Problem: Dying Patient, Actively (Adult)  Goal: Peace/Preservation of Dignity During the Dying Process  Outcome: Ongoing (interventions implemented as appropriate)  Flowsheets (Taken 12/7/2019 0500)  Peace/Preservation of Dignity During the Dying Process: making progress toward outcome     Problem: Patient Care Overview  Goal: Individualization and Mutuality  Outcome: Ongoing (interventions implemented as appropriate)  Flowsheets  Taken 12/4/2019 0536 by Suki Lino, RN  Patient Specific Goals (Include Timeframe): comfort  What Information Would Help Us Give You More Personalized Care?: none voiced  What Anxieties, Fears, Concerns, or Questions Do You Have About Your Care?: none voiced  Patient Specific Preferences: comfort care, pain control  Taken 12/7/2019 0500 by Sabrina Cline, RN  How to Address Anxieties/Fears: none reported  Patient Specific Interventions: prn medications for symptom management and comfort, turn every 4 hours  How Would You and/or Your Support Person Like to Participate in Your Care?: include pt daughter in POC     Problem: Patient Care Overview  Goal: Plan of Care Review  Outcome: Ongoing (interventions implemented as appropriate)  Flowsheets (Taken 12/7/2019 0500)  Progress: declining  Plan of Care Reviewed With: daughter  Outcome Summary: Pt medicated prior to turns with morphine and ativan.  Had to increase both morphine at ativan doses to increased work of breathing for pt comfort, and added robinul. Daughter at bedside, updated on pt declining condition. Will monitor.

## 2019-12-07 NOTE — PROGRESS NOTES
"DAILY PROGRESS NOTE  [facility], [facility]      2019  Patient Identification:  Name: Maru Richey  Age: 89 y.o.  Sex: female  :  1925  MRN: [id number]         Primary Care Physician: Issac Navas MD    Subjective:  Interval History:    Patient is comfortable.  On palliative care orders only.  Hypotensive, tachycardic, saturation is low.    Objective:    Intake/Output:    Intake/Output Summary (Last 24 hours) at 2019 1011  Last data filed at 2019 0501  Gross per 24 hour   Intake 0 ml   Output 800 ml   Net -800 ml         Exam:    BP (!) 75/47 (BP Location: Right arm, Patient Position: Lying)   Pulse 105   Temp 96.8 °F (36 °C) (Oral)   Resp 20   Ht 162.6 cm (64.02\")   Wt 50 kg (110 lb 3.7 oz)   SpO2 100%   BMI 18.91 kg/m²     General: Lethargic at this time.  No distress.    Neck: Supple, symmetrical, trachea midline, no adenopathy;              thyroid:  no enlargement/tenderness/nodules;              no carotid bruit or JVD  Cardiovascular: Tachycardic. Exam reveals no gallop and no friction rub. No murmur heard  Pulmonary: Clear to auscultation bilaterally, respirations unlabored.               No rhonchi, wheezing or rales.   Abdominal: Soft. Soft, non-tender, bowel sounds active all four quadrants,     no masses, no hepatomegaly, no splenomegaly.   Extremities: Normal, atraumatic, no cyanosis or edema  Neurological: Lethargic.  No new focal deficit.    Skin: Skin color, texture, turgor normal, no rashes or lesions          Assessment:        Complicated UTI (urinary tract infection)  Acute UTI with sepsis  Proteus bacteremia with sepsis  Dehydration  Elevated troponin   Dementia  Hypertension   Hyperlipidemia   Gastroesophageal for disease            Plan:    Patient is comfortable  Will continue palliative care/comfort measures orders only  Will ask hospice for possibility of hospice catheter bed.          Austin Boudreaux, " MD  12/7/2019  10:11 AM

## 2019-12-07 NOTE — CONSULTS
Pt is eligible for HSB per Nick CHANDRA. Consents signed. Veronica Pate spoke to MD who approves HSB today. Pt admitted to HSB. ID # [id number] [facility] 10: G31.1    Thank you for the referral.    Veronica Alexandre RN  [facility]   [phone]

## 2019-12-07 NOTE — PLAN OF CARE
Patient flipped to HSB today. Patient premedicated prior to turns to promote comfort. No family at bedside yet today. Responsive to pain. Turn Q4 hours. Will continue to monitor

## 2019-12-07 NOTE — PLAN OF CARE
Problem: Patient Care Overview  Goal: Plan of Care Review  Outcome: Ongoing (interventions implemented as appropriate)  Flowsheets (Taken 12/7/2019 1644)  Progress: declining  Plan of Care Reviewed With: patient  Outcome Summary: patient flipped to HSB today. patient premedicated prior to turns to promote comfort. no family at bedside so far today. new IV per need. will continue to monitor patient     Problem: Patient Care Overview  Goal: Plan of Care Review  Outcome: Ongoing (interventions implemented as appropriate)  Flowsheets (Taken 12/7/2019 1644)  Progress: declining  Plan of Care Reviewed With: patient  Outcome Summary: patient flipped to HSB today. patient premedicated prior to turns to promote comfort. no family at bedside so far today. new IV per need. will continue to monitor patient     Problem: Patient Care Overview  Goal: Individualization and Mutuality  Outcome: Ongoing (interventions implemented as appropriate)  Flowsheets (Taken 12/7/2019 1644)  Patient Specific Goals (Include Timeframe): comfort care until the end  Patient Specific Interventions: prn meds prior to turns  Patient Specific Preferences: comfort care     Problem: Skin Injury Risk (Adult)  Goal: Skin Health and Integrity  Outcome: Ongoing (interventions implemented as appropriate)  Flowsheets (Taken 12/7/2019 1644)  Skin Health and Integrity: making progress toward outcome     Problem: Dying Patient, Actively (Adult)  Goal: Comfort/Pain Control  Outcome: Ongoing (interventions implemented as appropriate)  Flowsheets (Taken 12/7/2019 1644)  Comfort/Pain Control: making progress toward outcome

## 2019-12-08 PROBLEM — Z51.5 ADMISSION FOR HOSPICE CARE: Status: ACTIVE | Noted: 2019-01-01

## 2019-12-08 NOTE — PLAN OF CARE
Problem: Patient Care Overview  Goal: Plan of Care Review  Outcome: Ongoing (interventions implemented as appropriate)  Flowsheets  Taken 12/8/2019 1601  Progress: declining  Taken 12/8/2019 0737  Plan of Care Reviewed With: patient  Patient premedicated prior to turns. No ativan per daughter request. Robinul given x1 this morning with good results. New IV per need. No family at bedside. Will continue to monitor

## 2019-12-08 NOTE — H&P
HISTORY AND PHYSICAL   [facility], [facility]      2019    Patient Identification:    Name: Maru Richey  Age: 89 y.o.  Sex: female  :  1925  MRN: [id number]                       Primary Care Physician: Issac Navas MD    Chief Complaint:      No chief complaint on file.        History of Present Illness:     89-year-old female with history of hypertension hyperlipidemia and iron deficiency anemia who is a nursing home resident sent to the emergency room at [facility] with altered mental status.  Patient is sleepy lethargic.  Patient is in no respiratory distress and work-up in ER revealed dehydration and complicated UTI admit for management.  Patient has dementia and unable to give detailed history most of the history obtained from the chart  nursing staff and old record.  Patient has elevated troponin with no chest pain shortness of breath or palpitation.  Patient admitted for management.    Upon admission patient was placed on IV fluids, IV antibiotics.  Unfortunately she did not improve, she was not eating well, identified, so family decided to change her care to palliative care/comfort measures and no nitroglycerin.  Patient was transferred to K unit, placed on palliative care orders only.  Patient was seen by hospice and qualify for hospice discussed with Becka.  Patient was discharged and then readmitted because a hospice candidate.  Patient is to continue on palliative care orders only.        Past Medical History:  Past Medical History:   Diagnosis Date   • Hyperlipidemia    • Hypertension    • Iron deficiency      Past Surgical History:  Past Surgical History:   Procedure Laterality Date   • APPENDECTOMY     • HYSTERECTOMY        Home Meds:  Medications Prior to Admission   Medication Sig Dispense Refill Last Dose   • Elastic Bandages & Supports (MEDICAL COMPRESSION SOCKS) misc Compression socks due to edema in legs and ankles 2 each 1 10/7/2019 at Unknown time        Allergies:  No Known Allergies  Immunizations:  There is no immunization history for the selected administration types on file for this patient.  Social History:   Social History     Social History Narrative   • Not on file     Social History     Tobacco Use   • Smoking status: Never Smoker   • Smokeless tobacco: Never Used   Substance Use Topics   • Alcohol use: No     Frequency: Never     Family History:  No family history on file.     Review of Systems  See history of present illness and past medical history.    Patient denies headache, dizziness, syncope, falls, trauma, change in vision, change in hearing, change in taste, changes in weight, changes in appetite, focal weakness, numbness, or paresthesia.    Patient denies chest pain, palpitations, dyspnea, orthopnea, PND, cough, sinus pressure, rhinorrhea, epistaxis, hemoptysis, nausea, vomiting,hematemesis, diarrhea, constipation or hematchezia.    Denies cold or heat intolerance, polydipsia, polyuria, polyphagia.   Denies hematuria, pyuria, dysuria, hesitancy, frequency or urgency.   Denies consumption of raw and under cooked meats foods or change in water source.  Denies fever, chills, sweats, night sweats.    Denies missing any routine medications.   Remainder of ROS is negative.    Objective:    Exam:    tMax 24 hrs: Temp (24hrs), Av.3 °F (36.3 °C), Min:97.1 °F (36.2 °C), Max:97.5 °F (36.4 °C)    Vitals Ranges:   Temp:  [97.1 °F (36.2 °C)-97.5 °F (36.4 °C)] 97.1 °F (36.2 °C)  Heart Rate:  [106-107] 106  Resp:  [24-28] 24  BP: (92)/(52) 92/52    BP 92/52 (BP Location: Left arm, Patient Position: Lying)   Pulse 106   Temp 97.1 °F (36.2 °C) (Oral)   Resp 24   SpO2 94%     General: Alert, oriented x  . Cooperative, no distress, appears stated age  HEENT:    Head: Normocephalic, without obvious abnormality, atraumatic  Eyes: EOM are normal. Pupils are equal, round, and reactive to light.   Oropharynx: Mucosa and tongue normal  Neck: Supple,  symmetrical, trachea midline, no adenopathy;              thyroid:  no enlargement/tenderness/nodules;              no carotid bruit or JVD  Cardiovascular: Normal rate, regular rhythm and intact distal pulses.              Exam reveals no gallop and no friction rub. No murmur heard  Chest wall: No tenderness or deformity  Pulmonary: Clear to auscultation bilaterally, respirations unlabored.               No rhonchi, wheezing or rales.   Abdominal: Soft. Soft, non-tender, bowel sounds active all four quadrants,     no masses, no hepatomegaly, no splenomegaly.   Extremities: Normal, atraumatic, no cyanosis or edema  Pulses: 2 + symmetric all extremities  Neurological: Patient is alert and oriented to person, place, and time.                 CNII-XII intact, normal strength, sensation intact throughout  Skin: Skin color, texture, normal. Turgor is decreased. No rashes or lesions      Data Review:    Results from last 7 days   Lab Units 12/04/19  0551 12/03/19 0438 12/02/19  0815   WBC 10*3/mm3 12.25* 12.40* 8.43   HEMOGLOBIN g/dL 10.0* 10.1* 11.6*   HEMATOCRIT % 29.0* 28.7* 34.2   PLATELETS 10*3/mm3 170 242 316       Results from last 7 days   Lab Units 12/04/19  0551 12/03/19 0438 12/02/19  0815   SODIUM mmol/L 149* 140 149*   POTASSIUM mmol/L 3.8 3.2* 4.5   CHLORIDE mmol/L 116* 107 109*   CO2 mmol/L 17.3* 18.8* 22.7   BUN mg/dL 49* 45* 46*   CREATININE mg/dL 1.10* 1.35* 1.69*   CALCIUM mg/dL 9.8* 10.1* 11.4*   BILIRUBIN mg/dL  --  0.3 0.5   ALK PHOS U/L  --  76 84   ALT (SGPT) U/L  --  14 17   AST (SGOT) U/L  --  26 44*   GLUCOSE mg/dL 83 115* 144*           Results from last 7 days   Lab Units 12/03/19  0438   HEMOGLOBIN A1C % 6.10*       Lab Results   Lab Value Date/Time    TROPONINT 0.018 12/03/2019 0438    TROPONINT 0.024 12/02/2019 0815    TROPONINT <0.010 11/11/2019 0641    TROPONINT <0.010 11/10/2019 1321    TROPONINT <0.010 11/10/2019 0707    TROPONINT <0.010 11/09/2019 0521    TROPONINT <0.010 08/08/2019  0518    TROPONINT <0.010 08/07/2019 1536       Brief Urine Lab Results  (Last result in the past 365 days)      Color   Clarity   Blood   Leuk Est   Nitrite   Protein   CREAT   Urine HCG        12/02/19 0803 Orange  Comment:  Any Substance that causes an abnormal urine color can alter the accuracy of the chemical reactions. Turbid Large (3+) Large (3+) Negative >=300 mg/dL (3+)                Imaging Results (All)     None          Assessment:      Admission for hospice care  General: Lethargic at this time.  No distress.    Neck: Supple, symmetrical, trachea midline, no adenopathy;              thyroid:  no enlargement/tenderness/nodules;              no carotid bruit or JVD  Cardiovascular: Tachycardic. Exam reveals no gallop and no friction rub. No murmur heard  Pulmonary: Clear to auscultation bilaterally, respirations unlabored.               No rhonchi, wheezing or rales.   Abdominal: Soft. Soft, non-tender, bowel sounds active all four quadrants,     no masses, no hepatomegaly, no splenomegaly.   Extremities: Normal, atraumatic, no cyanosis or edema  Neurological: Lethargic.  No new focal deficit.    Skin: Skin color, texture, turgor normal, no rashes or lesions           Plan:    Inpatient admission  Continue palliative care orders/comfort measures orders  Patient comfortable  We will follow          Austin Boudreaux MD  12/8/2019  11:17 AM

## 2019-12-08 NOTE — PLAN OF CARE
Problem: Skin Injury Risk (Adult)  Goal: Skin Health and Integrity  Flowsheets (Taken 12/8/2019 0601)  Skin Health and Integrity: making progress toward outcome     Problem: Patient Care Overview  Goal: Plan of Care Review  Flowsheets (Taken 12/8/2019 0601)  Progress: declining  Plan of Care Reviewed With: patient; daughter  Outcome Summary: palliative care pt.  comfort measures only.  turned per protocol.  khan catheter in place.  meds given per orders.  daughter at bedside.  will continue to monitor.     Problem: Dying Patient, Actively (Adult)  Goal: Comfort/Pain Control  Flowsheets (Taken 12/8/2019 0601)  Comfort/Pain Control: making progress toward outcome  Goal: Peace/Preservation of Dignity During the Dying Process  Flowsheets (Taken 12/8/2019 0601)  Peace/Preservation of Dignity During the Dying Process: making progress toward outcome     Problem: Fall Risk (Adult)  Goal: Identify Related Risk Factors and Signs and Symptoms  Flowsheets (Taken 12/8/2019 0601)  Related Risk Factors (Fall Risk): age-related changes; bladder function altered; gait/mobility problems; slippery/uneven surfaces; environment unfamiliar; sensory deficits  Goal: Absence of Fall  Flowsheets (Taken 12/8/2019 0601)  Absence of Fall: making progress toward outcome     Problem: Palliative Care (Adult)  Goal: Identify Related Risk Factors and Signs and Symptoms  Flowsheets (Taken 12/8/2019 0601)  Palliative Care: Related Risk Factors: advanced age; condition is progressive; terminal illness  Palliative Care: Signs and Symptoms: pain  Goal: Maximized Comfort  Flowsheets (Taken 12/8/2019 0601)  Maximized Comfort: making progress toward outcome  Goal: Enhanced Quality of Life  Flowsheets (Taken 12/8/2019 0601)  Enhanced Quality of Life: unable to achieve outcome

## 2019-12-09 NOTE — PROGRESS NOTES
Case Management Discharge Note      Final Note: Admitted to a hospitals scattered bed on 12/7/19. NIALL Connelly RN, CCP.          Destination - Selection Complete      Service Provider Request Status Selected Services Address Phone Number Fax Number    [facility] Selected Inpatient Hospice [address], [facility] [id number] [phone]      Durable Medical Equipment      No service has been selected for the patient.      Dialysis/Infusion      No service has been selected for the patient.      Home Medical Care      No service has been selected for the patient.      Therapy      No service has been selected for the patient.      Community Resources      No service has been selected for the patient.             Final Discharge Disposition Code: 51 - hospice medical facility

## 2019-12-09 NOTE — PROGRESS NOTES
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 She reports patient continues to receive IV morphine and IV robinul every four hours. Patient has a PPS of 10%, unresponsive, nail beds are cyanotic, extremities are cool to cold to touch with mottling present and accessory muscles are being used with respirations. Patient does appear comfortable with no distressing signs present. Patient appears to be actively dying and imminent. RN spoke with daughter, Lisa, by phone. RN updated daughter on current condition and comfort level. Daughter has no questions, concerns or issues at this time.          Juancarlos Rosario RN

## 2019-12-09 NOTE — PLAN OF CARE
Problem: Patient Care Overview  Goal: Plan of Care Review  Outcome: Ongoing (interventions implemented as appropriate)  Flowsheets (Taken 12/9/2019 8866)  Progress: declining  Plan of Care Reviewed With: patient; daughter  Outcome Summary: `Premedicated prior to turns. Pt was moaning about 2 hr after first turn w/Morphine and Robinul only. Called Pt dtr Lisa and sw her regarding use of ativan. She stated she wants her to be comfortable so use whatever meds we need to achieve that. Pt started on Morphine 4 mg, Ativan 0.5mg and Robinul 0.4mg prior to next turn. Pt still moaned with turn but did settle quickly. No family at bedside. Will continue comfort measures and monitoring.

## 2019-12-09 NOTE — PROGRESS NOTES
[facility] Visit Report    Maru Richey  [id number]  12/8/2019    Admission R/T [facility] Dx: Yes    Reason for [facility] Admission: Dementia     Symptom  Management: Pain control    Nursing/Medication Recommendations: Continue to medicate imminent patient for comfort    Psychosocial Issues and Recommendations:    Spiritual Concerns and Recommendations:    [facility] Discharge Plans:  No orders for discharge. Patient is actively dying and appears imminent with minutes to hours.     Review of Visit: Reviewed v/s, medications and notes in Epic. Arrived in room, no family is present. Patient is lying in bed on her left side. She is non responsive to voice or touch. Respirations are shallow on RA. Mottling/cyanosis is noted to bilateral hands, fingers and nailbeds. Her hands and fingers are cold to touch. Toes and feet are also noted with mottling/cyanosis and are cold to touch. F/C is noted with no urine output noted. Patients most recent v/s are: T 97, , RR 24, B/P 74/49. Patient is actively dying and appears imminent. In the last 24 hours patient has received Morphine 4mg IV x5 doses and Robinul 0.4mg IV x2 doses. Will continue to visit daily, assess needs of patient/family and provide support        Debra Tucker RN  [facility] Visit Nurse

## 2019-12-09 NOTE — PROGRESS NOTES
Daily progress note    Chief complaint  Comfortable  No new issues  Family at bedside    History of present illness  89-year-old female with history of hypertension hyperlipidemia and iron deficiency anemia who is a nursing home resident sent to the emergency room at [facility] [place], operated by [facility] with altered mental status.  Patient is sleepy lethargic.  Patient is in no respiratory distress and work-up in ER revealed dehydration and complicated UTI admit for management.  Patient has dementia and unable to give detailed history most of the history obtained from the chart  nursing staff and old record.  Patient has elevated troponin with no chest pain shortness of breath or palpitation.  Patient admitted for management.        PHYSICAL EXAM   Blood pressure (!) 74/49, pulse 98, temperature 97.8 °F (36.6 °C), temperature source Oral, resp. rate 18, SpO2 94 %.    Unchanged    LAB RESULTS  Lab Results (last 24 hours)     ** No results found for the last 24 hours. **        Imaging Results (Last 24 Hours)     ** No results found for the last 24 hours. **          Current Facility-Administered Medications:   •  acetaminophen (TYLENOL) tablet 650 mg, 650 mg, Oral, Q4H PRN **OR** acetaminophen (TYLENOL) 160 MG/5ML solution 650 mg, 650 mg, Oral, Q4H PRN **OR** acetaminophen (TYLENOL) suppository 650 mg, 650 mg, Rectal, Q4H PRN, Austin Boudreaux MD  •  diphenoxylate-atropine (LOMOTIL) 2.5-0.025 MG per tablet 1 tablet, 1 tablet, Oral, Q2H PRN, Austin Boudreaux MD  •  Glycerin-Hypromellose- (ARTIFICIAL TEARS) 0.2-0.2-1 % ophthalmic solution solution 1 drop, 1 drop, Both Eyes, Q30 Min PRN, Austin Boudreaux MD  •  glycopyrrolate PF (ROBINUL) injection 0.2 mg, 0.2 mg, Intravenous, Q2H PRN **OR** glycopyrrolate PF (ROBINUL) injection 0.2 mg, 0.2 mg, Subcutaneous, Q2H PRN **OR** glycopyrrolate PF (ROBINUL) injection 0.4 mg, 0.4 mg, Intravenous, Q2H PRN, 0.4 mg at 12/09/19 1246 **OR** glycopyrrolate PF (ROBINUL) injection 0.4 mg, 0.4 mg,  Subcutaneous, Q2H PRN, Austin Boudreaux MD  •  HYDROmorphone (DILAUDID) injection 0.5 mg, 0.5 mg, Intravenous, Q1H PRN **OR** morphine injection 2 mg, 2 mg, Intravenous, Q1H PRN, 2 mg at 12/07/19 2323 **OR** morphine concentrated solution solution 5 mg, 5 mg, Sublingual, Q1H PRNKanika Marlon R, MD  •  HYDROmorphone (DILAUDID) injection 1.5 mg, 1.5 mg, Intravenous, Q1H PRN **OR** Morphine injection 6 mg, 6 mg, Intravenous, Q1H PRN **OR** morphine concentrated solution solution 20 mg, 20 mg, Sublingual, Q1H PRN, Austin Boudreaux MD  •  HYDROmorphone PF (DILAUDID) injection 1 mg, 1 mg, Intravenous, Q1H PRN **OR** Morphine sulfate (PF) injection 4 mg, 4 mg, Intravenous, Q1H PRN, 4 mg at 12/09/19 1246 **OR** morphine concentrated solution solution 10 mg, 10 mg, Sublingual, Q1H PRN, Austin Boudreaux MD  •  LORazepam (ATIVAN) injection 0.5 mg, 0.5 mg, Intravenous, Q1H PRN, 0.5 mg at 12/09/19 1246 **OR** LORazepam (ATIVAN) 2 MG/ML concentrated solution 0.5 mg, 0.5 mg, Sublingual, Q1H PRKanika CALERO Marlon R, MD  •  LORazepam (ATIVAN) injection 1 mg, 1 mg, Intravenous, Q1H PRN **OR** LORazepam (ATIVAN) 2 MG/ML concentrated solution 1 mg, 1 mg, Sublingual, Q1H PRKanika CALERO Marlon R, MD  •  LORazepam (ATIVAN) injection 2 mg, 2 mg, Intravenous, Q1H PRN, 2 mg at 12/07/19 1724 **OR** LORazepam (ATIVAN) 2 MG/ML concentrated solution 2 mg, 2 mg, Sublingual, Q1H PRNKanika Marlon R, MD  •  miconazole (MICOTIN) 2 % powder 1 application, 1 application, Topical, BID PRN, [place], Austin R, MD  •  Scopolamine (TRANSDERM-SCOP) 1.5 MG/3DAYS patch 1 patch, 1 patch, Transdermal, Q72H Kanika SRIVASTAVA Marlon R, MD     ASSESSMENT  Acute UTI with sepsis  Proteus bacteremia with sepsis  Dehydration  Elevated troponin   Dementia  Hypertension   Hyperlipidemia   Gastroesophageal for disease     PLAN  Hospice scattered bed  Comfort care only  Allow natural death  Routine palliative care orders  Discussed with family    LYDIA SHEIKH MD

## 2019-12-09 NOTE — PLAN OF CARE
Problem: Patient Care Overview  Goal: Plan of Care Review  Outcome: Ongoing (interventions implemented as appropriate)  Flowsheets  Taken 12/9/2019 0522  Progress: declining  Outcome Summary: Maintained comfort measures per palliative care protocol. Patient is premedicated with Morphine and robinul prior to turns. No family at bedside. Will continue to monitor vital signs and comfort.  Taken 12/8/2019 2007  Plan of Care Reviewed With: patient

## 2019-12-10 NOTE — PROGRESS NOTES
[facility]  Visit Report    Maru Richey  [id number]  12/10/2019      Review of Visit (Include All Collaboration- including names of hospital and family involved during admission/visit):  [facility] RN not initially available, pt non-responsive, [facility] isabel Cohn present completing ADLs/turn, insuring pt comfortable; [facility] provided presence, later provided prayer at bedside aloud, called pt jose Gonzalez, who was apprec of call and visit, shared she had lost a son recently and moved pt to [place] from [place], no local [place] connection; [facility] offered condolences at the loss of Lisa's son, normalized grief response at not being able to be with her mother because of the loss of her son and the length of non-responsiveness; [facility] encouraged closure conversations if Lisa has not already shared those, as well as self care, reminded Lisa of grief counseling availability, and to [facility] with any needs or concerns.  Lisa also found comfort that [facility] had provided prayer aloud at bedside for pt and for family.      Moose Huerta, BCC

## 2019-12-10 NOTE — PROGRESS NOTES
Daily progress note    Chief complaint  Comfortable  No new issues  Family at bedside    History of present illness  89-year-old female with history of hypertension hyperlipidemia and iron deficiency anemia who is a nursing home resident sent to the emergency room at [facility] with altered mental status.  Patient is sleepy lethargic.  Patient is in no respiratory distress and work-up in ER revealed dehydration and complicated UTI admit for management.  Patient has dementia and unable to give detailed history most of the history obtained from the chart  nursing staff and old record.  Patient has elevated troponin with no chest pain shortness of breath or palpitation.  Patient admitted for management.        PHYSICAL EXAM   Blood pressure (!) 65/43, pulse 89, temperature 96.9 °F (36.1 °C), temperature source Oral, resp. rate 20, SpO2 96 %.    Unchanged    LAB RESULTS  Lab Results (last 24 hours)     ** No results found for the last 24 hours. **        Imaging Results (Last 24 Hours)     ** No results found for the last 24 hours. **          Current Facility-Administered Medications:   •  acetaminophen (TYLENOL) tablet 650 mg, 650 mg, Oral, Q4H PRN **OR** acetaminophen (TYLENOL) 160 MG/5ML solution 650 mg, 650 mg, Oral, Q4H PRN **OR** acetaminophen (TYLENOL) suppository 650 mg, 650 mg, Rectal, Q4H PRN, Austin Boudreaux MD  •  diphenoxylate-atropine (LOMOTIL) 2.5-0.025 MG per tablet 1 tablet, 1 tablet, Oral, Q2H PRN, Austin Boudreaux MD  •  Glycerin-Hypromellose- (ARTIFICIAL TEARS) 0.2-0.2-1 % ophthalmic solution solution 1 drop, 1 drop, Both Eyes, Q30 Min PRN, Austin Boudreaux MD  •  glycopyrrolate PF (ROBINUL) injection 0.2 mg, 0.2 mg, Intravenous, Q2H PRN **OR** glycopyrrolate PF (ROBINUL) injection 0.2 mg, 0.2 mg, Subcutaneous, Q2H PRN **OR** glycopyrrolate PF (ROBINUL) injection 0.4 mg, 0.4 mg, Intravenous, Q2H PRN, 0.4 mg at 12/10/19 1433 **OR** glycopyrrolate PF (ROBINUL) injection 0.4 mg, 0.4 mg,  Subcutaneous, Q2H PRN, Austin Boudreaux MD  •  HYDROmorphone (DILAUDID) injection 0.5 mg, 0.5 mg, Intravenous, Q1H PRN **OR** morphine injection 2 mg, 2 mg, Intravenous, Q1H PRN, 2 mg at 12/07/19 2323 **OR** morphine concentrated solution solution 5 mg, 5 mg, Sublingual, Q1H PRNKanika Marlon R, MD  •  HYDROmorphone (DILAUDID) injection 1.5 mg, 1.5 mg, Intravenous, Q1H PRN **OR** Morphine injection 6 mg, 6 mg, Intravenous, Q1H PRN **OR** morphine concentrated solution solution 20 mg, 20 mg, Sublingual, Q1H PRKanika CALERO Marlon R, MD  •  HYDROmorphone PF (DILAUDID) injection 1 mg, 1 mg, Intravenous, Q1H PRN, 1 mg at 12/10/19 1433 **OR** Morphine sulfate (PF) injection 4 mg, 4 mg, Intravenous, Q1H PRN, 4 mg at 12/10/19 1017 **OR** morphine concentrated solution solution 10 mg, 10 mg, Sublingual, Q1H PRKanika CALERO Marlon R, MD  •  LORazepam (ATIVAN) injection 0.5 mg, 0.5 mg, Intravenous, Q1H PRN, 0.5 mg at 12/09/19 1246 **OR** LORazepam (ATIVAN) 2 MG/ML concentrated solution 0.5 mg, 0.5 mg, Sublingual, Q1H PRKanika CALERO Marlon R, MD  •  LORazepam (ATIVAN) injection 1 mg, 1 mg, Intravenous, Q1H PRN, 1 mg at 12/10/19 1433 **OR** LORazepam (ATIVAN) 2 MG/ML concentrated solution 1 mg, 1 mg, Sublingual, Q1H PRKanika CALERO Marlon R, MD  •  LORazepam (ATIVAN) injection 2 mg, 2 mg, Intravenous, Q1H PRN, 2 mg at 12/07/19 1724 **OR** LORazepam (ATIVAN) 2 MG/ML concentrated solution 2 mg, 2 mg, Sublingual, Q1H PRNKanika Marlon R, MD  •  miconazole (MICOTIN) 2 % powder 1 application, 1 application, Topical, BID PRN, Austin Boudreaux MD  •  Scopolamine (TRANSDERM-SCOP) 1.5 MG/3DAYS patch 1 patch, 1 patch, Transdermal, Q72H PRN, Austin Boudreaux MD     ASSESSMENT  Acute UTI with sepsis  Proteus bacteremia with sepsis  Dehydration  Elevated troponin   Dementia  Hypertension   Hyperlipidemia   Gastroesophageal for disease     PLAN  Hospice scattered bed  Comfort care only  Allow natural death  Routine palliative care  orders  Discussed with family    LYDIA SHEIKH MD

## 2019-12-10 NOTE — PLAN OF CARE
Problem: Patient Care Overview  Goal: Plan of Care Review  Outcome: Ongoing (interventions implemented as appropriate)  Flowsheets (Taken 12/10/2019 0645)  Progress: declining  Plan of Care Reviewed With: patient  Outcome Summary: Pt remains unresponsive. Premedicated with Morphine, ativan, and robinul prior to turns for pt comfort. Pt still groaning at times with turns but settled quickly. No family at bedside. Will monitor.

## 2019-12-11 NOTE — PLAN OF CARE
Patient has rested comfortably today. IV Morphine and Ativan given PRN for symptom management. Will continue to monitor and treat per MD orders and POC

## 2019-12-11 NOTE — PLAN OF CARE
Pt unresponsive, extremities cool, pt rr has declined. Comfort measures continue, family not present at bedside. Will continue to monitor.

## 2019-12-11 NOTE — PROGRESS NOTES
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 Patient is unresponsive and imminent. Patient's extremities are cold and mottled. Patient is having apnea during the visit. No family is present during the visit. Patient appears comfortable with no signs of distress during the dying process.        Juancarlos Rosario, RN

## 2019-12-11 NOTE — PROGRESS NOTES
Daily progress note    Chief complaint  Comfortable  No new issues  Family at bedside    History of present illness  89-year-old female with history of hypertension hyperlipidemia and iron deficiency anemia who is a nursing home resident sent to the emergency room at [facility] [place] with altered mental status.  Patient is sleepy lethargic.  Patient is in no respiratory distress and work-up in ER revealed dehydration and complicated UTI admit for management.  Patient has dementia and unable to give detailed history most of the history obtained from the chart  nursing staff and old record.  Patient has elevated troponin with no chest pain shortness of breath or palpitation.  Patient admitted for management.        PHYSICAL EXAM   Blood pressure (!) 71/42, pulse 78, temperature 96.9 °F (36.1 °C), temperature source Oral, resp. rate 12, SpO2 98 %.    Unchanged    LAB RESULTS  Lab Results (last 24 hours)     ** No results found for the last 24 hours. **        Imaging Results (Last 24 Hours)     ** No results found for the last 24 hours. **          Current Facility-Administered Medications:   •  acetaminophen (TYLENOL) tablet 650 mg, 650 mg, Oral, Q4H PRN **OR** acetaminophen (TYLENOL) 160 MG/5ML solution 650 mg, 650 mg, Oral, Q4H PRN **OR** acetaminophen (TYLENOL) suppository 650 mg, 650 mg, Rectal, Q4H PRN, Austin Boudreaux MD  •  diphenoxylate-atropine (LOMOTIL) 2.5-0.025 MG per tablet 1 tablet, 1 tablet, Oral, Q2H PRN, Austin Boudreaux MD  •  Glycerin-Hypromellose- (ARTIFICIAL TEARS) 0.2-0.2-1 % ophthalmic solution solution 1 drop, 1 drop, Both Eyes, Q30 Min PRN, Austin Boudreaux MD  •  glycopyrrolate PF (ROBINUL) injection 0.2 mg, 0.2 mg, Intravenous, Q2H PRN, 0.2 mg at 12/11/19 0631 **OR** glycopyrrolate PF (ROBINUL) injection 0.2 mg, 0.2 mg, Subcutaneous, Q2H PRN **OR** glycopyrrolate PF (ROBINUL) injection 0.4 mg, 0.4 mg, Intravenous, Q2H PRN, 0.4 mg at 12/10/19 1719 **OR** glycopyrrolate PF (ROBINUL)  injection 0.4 mg, 0.4 mg, Subcutaneous, Q2H PRN, Austin Boudreaux MD  •  HYDROmorphone (DILAUDID) injection 0.5 mg, 0.5 mg, Intravenous, Q1H PRN **OR** morphine injection 2 mg, 2 mg, Intravenous, Q1H PRN, 2 mg at 12/07/19 2323 **OR** morphine concentrated solution solution 5 mg, 5 mg, Sublingual, Q1H PRN, Austin Boudreaux MD  •  HYDROmorphone (DILAUDID) injection 1.5 mg, 1.5 mg, Intravenous, Q1H PRN **OR** Morphine injection 6 mg, 6 mg, Intravenous, Q1H PRN **OR** morphine concentrated solution solution 20 mg, 20 mg, Sublingual, Q1H PRN, Austin Boudreaux MD  •  HYDROmorphone PF (DILAUDID) injection 1 mg, 1 mg, Intravenous, Q1H PRN, 1 mg at 12/11/19 1057 **OR** Morphine sulfate (PF) injection 4 mg, 4 mg, Intravenous, Q1H PRN, 4 mg at 12/10/19 1017 **OR** morphine concentrated solution solution 10 mg, 10 mg, Sublingual, Q1H PRN, Austin Boudreaux MD  •  LORazepam (ATIVAN) injection 0.5 mg, 0.5 mg, Intravenous, Q1H PRN, 0.5 mg at 12/09/19 1246 **OR** LORazepam (ATIVAN) 2 MG/ML concentrated solution 0.5 mg, 0.5 mg, Sublingual, Q1H PRN, Austin Boudreaux MD  •  LORazepam (ATIVAN) injection 1 mg, 1 mg, Intravenous, Q1H PRN, 1 mg at 12/11/19 0341 **OR** LORazepam (ATIVAN) 2 MG/ML concentrated solution 1 mg, 1 mg, Sublingual, Q1H PRN, Austin Boudreaux MD  •  LORazepam (ATIVAN) injection 2 mg, 2 mg, Intravenous, Q1H PRN, 2 mg at 12/11/19 1057 **OR** LORazepam (ATIVAN) 2 MG/ML concentrated solution 2 mg, 2 mg, Sublingual, Q1H PRN, Austin Boudreaux MD  •  miconazole (MICOTIN) 2 % powder 1 application, 1 application, Topical, BID PRN, Austin Boudreaux MD  •  Scopolamine (TRANSDERM-SCOP) 1.5 MG/3DAYS patch 1 patch, 1 patch, Transdermal, Q72H PRN, uAstin Boudreaux MD     ASSESSMENT  Acute UTI with sepsis  Proteus bacteremia with sepsis  Dehydration  Elevated troponin   Dementia  Hypertension   Hyperlipidemia   Gastroesophageal for disease     PLAN  Hospice scattered bed  Comfort care only  Allow natural death  Routine  palliative care orders  Discussed with family    LYDIA SHEIKH MD

## 2019-12-12 NOTE — DISCHARGE SUMMARY
Discharge summary    Date of admission 2019  Date of death 2019    Discussion  89-year-old -American female with history of hypertension hyperlipidemia and chronic anemia who was a nursing home resident admitted to emergency room with altered mental status.  Patient was very sleepy lethargic.  Patient work-up in ER revealed acute UTI with sepsis and severe dehydration admit for management.  Patient also have elevated troponin level.  Patient admitted her blood cultures came back positive for Proteus CNS T8.  Patient was treated appropriate antibiotics but overall she was not doing well with respiratory symptoms and family decided to withdraw the care and keep her comfortable.  Patient transferred to critical care unit for comfort care and allow natural death.  Patient did not qualify for hospice care bed and continue with comfort care and  this morning peacefully in her body release with family.  Patient was DNR and was made allow natural death on this admission.  For details see discharge summary from acute care     Cause of death cardiopulmonary failure    LYDIA SHEIKH MD

## 2019-12-12 NOTE — PROGRESS NOTES
Case Management Discharge Note      Final Note: The patient  on 19 @ 02:55. NIALL Connelly RN, CCP         Destination - Selection Complete      Service Provider Request Status Selected Services Address Phone Number Fax Number    [facility] [phone] [facility] [address] [phone]      Durable Medical Equipment      No service has been selected for the patient.      Dialysis/Infusion      No service has been selected for the patient.      Home Medical Care      No service has been selected for the patient.      Therapy      No service has been selected for the patient.      Community Resources      No service has been selected for the patient.             Final Discharge Disposition Code: 41 -  in medical facility

## 2019-12-15 NOTE — DISCHARGE SUMMARY
PHYSICIAN DISCHARGE SUMMARY  [facility], [facility]    Patient Identification:    Name: Maru Richey  Age: 89 y.o.  Sex: female  :  1925  MRN: [id number]    Primary Care Physician: Issac Navas MD    Admit date: 2019    Discharge date and time:2019    Discharged Condition: critical    Discharge Diagnoses:    Complicated UTI (urinary tract infection)  Acute UTI with sepsis  Proteus bacteremia with sepsis  Dehydration  Elevated troponin   Dementia  Hypertension   Hyperlipidemia   Gastroesophageal for disease             Hospital Course: Maru Richey  is a 89-year-old female with history of hypertension hyperlipidemia and iron deficiency anemia who is a nursing home resident sent to the emergency room at [facility] with altered mental status.  Patient is sleepy lethargic.  Patient is in no respiratory distress and work-up in ER revealed dehydration and complicated UTI admit for management.  Patient has dementia and unable to give detailed history most of the history obtained from the chart  nursing staff and old record.  Patient has elevated troponin with no chest pain shortness of breath or palpitation.  Patient admitted for management.     Upon admission patient was placed on IV fluids, IV antibiotics.  Unfortunately she did not improve, she was not eating well, identified, so family decided to change her care to palliative care/comfort measures and no nitroglycerin.  Patient was transferred to K unit, placed on palliative care orders only.  Patient was seen by hospice and qualified for hospice scattered bed.  Patient will be discharged and then readmitted as HSB.  Patient will be continue on palliative care orders only.       PMHX:   Past Medical History:   Diagnosis Date   • Hyperlipidemia    • Hypertension    • Iron deficiency      PSHX:   Past Surgical History:   Procedure Laterality Date   • APPENDECTOMY     • HYSTERECTOMY             Consults:  "    Consults     Date and Time Order Name Status Description    12/3/2019 1544 Inpatient Infectious Diseases Consult Completed     12/2/2019 1550 Inpatient Cardiology Consult Completed     12/2/2019 1226 [facility] (on-call MD unless specified) Completed     11/14/2019 1828 Inpatient Urology Consult Completed     11/9/2019 0543 [facility] (on-call MD unless specified) Details Completed           Discharge Exam:    BP (!) 75/47 (BP Location: Right arm, Patient Position: Lying)   Pulse 105   Temp 96.8 °F (36 °C) (Oral)   Resp 20   Ht 162.6 cm (64.02\")   Wt 50 kg (110 lb 3.7 oz)   SpO2 100%   BMI 18.91 kg/m²     General: Lethargic at this time.  No distress.    Neck: Supple, symmetrical, trachea midline, no adenopathy;              thyroid:  no enlargement/tenderness/nodules;              no carotid bruit or JVD  Cardiovascular: Tachycardic. Exam reveals no gallop and no friction rub. No murmur heard  Pulmonary: Clear to auscultation bilaterally, respirations unlabored.               No rhonchi, wheezing or rales.   Abdominal: Soft. Soft, non-tender, bowel sounds active all four quadrants,     no masses, no hepatomegaly, no splenomegaly.   Extremities: Normal, atraumatic, no cyanosis or edema  Neurological: Lethargic.  No new focal deficit.    Skin: Skin color, texture, turgor normal, no rashes or lesions    Data Review:                    Invalid input(s): LABALBU, PROT        Lab Results   Lab Value Date/Time    TROPONINT 0.018 12/03/2019 0438    TROPONINT 0.024 12/02/2019 0815    TROPONINT <0.010 11/11/2019 0641    TROPONINT <0.010 11/10/2019 1321    TROPONINT <0.010 11/10/2019 0707    TROPONINT <0.010 11/09/2019 0521    TROPONINT <0.010 08/08/2019 0518    TROPONINT <0.010 08/07/2019 1536       Microbiology Results (last 10 days)     ** No results found for the last 240 hours. **           Imaging Results (All)     Procedure Component Value Units Date/Time    CT Head Without Contrast [[id number]] Collected:  12/02/19 0931 "     Updated:  12/02/19 7595    Narrative:       CT SCAN HEAD WITHOUT CONTRAST     CLINICAL HISTORY: Confusion and delirium.     TECHNIQUE: CT scan of the head was obtained with 3 mm axial images. No  intravenous contrast was administered.     FINDINGS:     The ventricles, sulci, and cisterns are age appropriate. There are  mild-to-moderate changes of chronic small vessel ischemic phenomena. The  basal ganglia and thalami are remarkable for prominent areas of  senescent mineralization involving the globus pallidus. The posterior  fossa structures are also incidentally remarkable for prominent areas of  mineralization involving the dentate nuclei and the cerebellar  hemispheric white matter. Atherosclerotic changes are seen within the  intracranial vasculature. When compared to the prior head CT as part of  the prior CT angiogram dated 08/09/2019, there is no significant  interval change.       Impression:          No CT evidence for acute intracranial pathology. Further evaluation  could be performed with MR imaging for more sensitive and specific  detection of intracranial pathology as clinically indicated.     These findings and recommendations were directly discussed with Lisa Lewis of the emergency room staff on 12/02/2019 at approximately 8:54  AM.           Radiation dose reduction techniques were utilized, including automated  exposure control and exposure modulation based on body size.     This report was finalized on 12/2/2019 1:32 PM by Dr. Woodrow Jimenez M.D.       XR Chest 2 View [[id number]] Collected:  12/02/19 0931     Updated:  12/02/19 0936    Narrative:       PA AND LATERAL CHEST     CLINICAL HISTORY: Dyspnea. Hypertension.     Compared to the previous chest x-ray dated 08/07/2019.     The lungs appear well-expanded and are free of infiltrates. There are no  pleural effusions. The heart is normal in size. The thoracic aorta is  mildly ectatic.     IMPRESSIONS: No evidence of acute disease within  the chest     This report was finalized on 12/2/2019 9:33 AM by Dr. Aleks Vences M.D.               Disposition:    [facility]    Patient Instructions:       Discharge Order (From admission, onward)     Start     Ordered    12/07/19 1619  Discharge readmit patient  Once     Expected Discharge Date:  12/07/19    Discharge Disposition:  Hospice/Medical Facility ([facility])    Physician of Record for Attribution - Please select from Treatment Team:  AUSTIN ELIZONDO [[id number]]    Review needed by CMO to determine Physician of Record:  No       Question Answer Comment   Physician of Record for Attribution - Please select from Treatment Team AUSTIN ELIZONOD    Review needed by CMO to determine Physician of Record No        12/07/19 1620                 Contact information for follow-up providers     Issac Navas MD .    Specialty:  Family Medicine  Contact information:  [address] [facility] [address] [phone]                   Contact information for after-discharge care     Destination     [facility] .    Service:  Inpatient Hospice  Contact information:  [address]  [facility] [address] [phone]                             Total time spent discharging patient including evaluation,post hospitalization follow up,  medication and post hospitalization instructions and education total time exceeds 30 minutes.    Signed:  Austin Elizondo MD

## 2023-01-05 NOTE — NURSING NOTE
I met with pts daughter Lisa at the bedside to provide and explanation of services with the focus on Scattered Bed.  Goals of care discussed and daughter confirmed comfort care and DNR status. She is agreeable to [facility] Scattered Bed and signed consents.  MD paged to request discharge/re-admit orders.  Dr Boudreaux called back and states will pass along to Dr Douglass in the morning. Plan is for [facility] to follow up tomorrow to finish admission then.  Thank You    Danielle Sandoval RN.  
Patient with 5.6 sec pauses on her monitor.  Also with frequent PVC's and Bigeminy.  Dr. Douglass called and informed.    Daughter updated via phone call and stated she will hurry and get here.   
No

## 2024-11-04 NOTE — ED PROVIDER NOTES
" EMERGENCY DEPARTMENT ENCOUNTER    Room Number:  16/16  Date seen:  8/7/2019  Time seen: 3:17 PM  PCP: Provider, No Known  Historian: EMS and pt      HPI:  Chief Complaint: syncope  A complete HPI/ROS/PMH/PSH/SH/FH are unobtainable due to: brief LOC  Context: Maru Richey is a 89 y.o. female who presents to the ED via EMS after being found unresponsive w/ L sided facial droop and leaning L trunk, last seen normal just prior to EMS arrival, until pt got in EMS vehicle. EMS reports glucose of 186 and BP of 120/70's. They deny pt having seizure like activity. Pt states that currently, she feels \"good\" and is unsure of what happened to her. Daughter reports pt having no significant medical history, except taking medicine for gout. Normally walks w/ walker or cane. Recently moved here from [place] to live with daughter.    Pain Location: n/a  Radiation: n/a  Quality: unresponsive  Intensity/Severity: moderate  Duration: unresponsive  Onset quality: gradual  Timing: constant  Progression: resolved  Aggravating Factors: n/a  Alleviating Factors: n/a  Previous Episodes: none  Treatment before arrival: EMS care  Associated Symptoms: L sided facial droop and leaning L trunk    PAST MEDICAL HISTORY  Active Ambulatory Problems     Diagnosis Date Noted   • No Active Ambulatory Problems     Resolved Ambulatory Problems     Diagnosis Date Noted   • No Resolved Ambulatory Problems     Past Medical History:   Diagnosis Date   • Hyperlipidemia    • Hypertension    • Iron deficiency          PAST SURGICAL HISTORY  Past Surgical History:   Procedure Laterality Date   • HYSTERECTOMY           FAMILY HISTORY  History reviewed. No pertinent family history.      SOCIAL HISTORY  Social History     Socioeconomic History   • Marital status: Single     Spouse name: Not on file   • Number of children: Not on file   • Years of education: Not on file   • Highest education level: Not on file   Tobacco Use   • Smoking status: Never Smoker "   Substance and Sexual Activity   • Alcohol use: No     Frequency: Never   • Drug use: No         ALLERGIES  Patient has no known allergies.        REVIEW OF SYSTEMS  Review of Systems   Constitutional: Negative for fever.   HENT: Negative for sore throat.    Respiratory: Negative for shortness of breath.    Cardiovascular: Negative for chest pain.   Gastrointestinal: Negative for abdominal pain.   Endocrine: Negative for polyuria.   Genitourinary: Negative for dysuria.   Musculoskeletal: Negative for neck pain.   Skin: Negative for rash.   Neurological: Positive for syncope and facial asymmetry (L sided). Negative for seizures and headaches.        (+) L leaning trunk   All other systems reviewed and are negative.           PHYSICAL EXAM  ED Triage Vitals   Temp Pulse Resp BP SpO2   -- -- -- -- --      Temp src Heart Rate Source Patient Position BP Location FiO2 (%)   -- -- -- -- --         GENERAL: not distressed  HENT: nares patent  EYES: no scleral icterus  CV: regular rhythm, regular rate  RESPIRATORY: normal effort, CTAB  ABDOMEN: soft, nontender  MUSCULOSKELETAL: no deformity  NEURO:     Mental status  LOC: awake, alert and fully oriented to person, place, time, and situation.  Attention and memory intact. Fund of knowledge normal for age and education.  Language is fluent with normal naming, comprehension, and repetition.   There is no neglect.    Cranial nerves  PERRL  Visual fields full to confrontation.  EOMI with full versions, normal pursuits, and saccades.   Facial strength is full and symmetric.   Facial sensation is intact in V1-V3.  Hearing is intact to finger rub bilaterally.   Tongue protrudes midline.   Uvula and palate elevate symmetrically.  Speech is clear without notable labial, lingual, or guttural dysarthria.   Shoulder shrug and sternocleidomastoid activation are full and symmetric.    Motor  Normal bulk and tone.   Strength is 4+/5 in bilateral upper and lower extremities.     There is no  pronator drift.    Sensory  Sensation is intact to light touch in bilateral upper and lower extremities.     Coordination  Normal finger-nose-finger and heel-to-shin testing.    Station and gait  She needs minimal assistance for ambulation    Reflexes  No meningismus.       NIHSS 0    SKIN: warm, dry    Vital signs and nursing notes reviewed.          LAB RESULTS  Recent Results (from the past 24 hour(s))   Comprehensive Metabolic Panel    Collection Time: 08/07/19  3:36 PM   Result Value Ref Range    Glucose 147 (H) 65 - 99 mg/dL    BUN 32 (H) 8 - 23 mg/dL    Creatinine 1.22 (H) 0.57 - 1.00 mg/dL    Sodium 141 136 - 145 mmol/L    Potassium 4.2 3.5 - 5.2 mmol/L    Chloride 105 98 - 107 mmol/L    CO2 26.8 22.0 - 29.0 mmol/L    Calcium 10.7 (H) 8.2 - 9.6 mg/dL    Total Protein 7.0 6.0 - 8.5 g/dL    Albumin 3.90 3.50 - 5.20 g/dL    ALT (SGPT) 14 1 - 33 U/L    AST (SGOT) 22 1 - 32 U/L    Alkaline Phosphatase 63 39 - 117 U/L    Total Bilirubin 0.3 0.2 - 1.2 mg/dL    eGFR Non African Amer 41 (L) >60 mL/min/1.73    eGFR  African Amer 50 (L) >60 mL/min/1.73    Globulin 3.1 gm/dL    A/G Ratio 1.3 g/dL    BUN/Creatinine Ratio 26.2 (H) 7.0 - 25.0    Anion Gap 9.2 5.0 - 15.0 mmol/L   BNP    Collection Time: 08/07/19  3:36 PM   Result Value Ref Range    proBNP 193.2 5.0-1,800.0 pg/mL   Troponin    Collection Time: 08/07/19  3:36 PM   Result Value Ref Range    Troponin T <0.010 0.000 - 0.030 ng/mL   CBC Auto Differential    Collection Time: 08/07/19  3:36 PM   Result Value Ref Range    WBC 5.52 3.40 - 10.80 10*3/mm3    RBC 3.44 (L) 3.77 - 5.28 10*6/mm3    Hemoglobin 10.1 (L) 12.0 - 15.9 g/dL    Hematocrit 32.6 (L) 34.0 - 46.6 %    MCV 94.8 79.0 - 97.0 fL    MCH 29.4 26.6 - 33.0 pg    MCHC 31.0 (L) 31.5 - 35.7 g/dL    RDW 14.6 12.3 - 15.4 %    RDW-SD 50.4 37.0 - 54.0 fl    MPV 11.2 6.0 - 12.0 fL    Platelets 114 (L) 140 - 450 10*3/mm3   Manual Differential    Collection Time: 08/07/19  3:36 PM   Result Value Ref Range     Neutrophil % 73.0 42.7 - 76.0 %    Lymphocyte % 21.0 19.6 - 45.3 %    Monocyte % 5.0 5.0 - 12.0 %    Eosinophil % 1.0 0.3 - 6.2 %    Neutrophils Absolute 4.03 1.70 - 7.00 10*3/mm3    Lymphocytes Absolute 1.16 0.70 - 3.10 10*3/mm3    Monocytes Absolute 0.28 0.10 - 0.90 10*3/mm3    Eosinophils Absolute 0.06 0.00 - 0.40 10*3/mm3    RBC Morphology Normal Normal    WBC Morphology Normal Normal    Platelet Morphology Normal Normal       Ordered the above labs and reviewed the results.        RADIOLOGY  CT Head Without Contrast   Final Result   Cortical atrophy and evidence of mild small vessel chronic   ischemic change as described. No acute intracranial abnormality is   identified.       This report was finalized on 8/7/2019 5:06 PM by Dr. Aleks Vences M.D.          XR Chest 1 View   Final Result   FINDINGS: The lungs are well-expanded and clear and the heart is top normal in size. There is no acute disease.     This report was finalized on 8/7/2019 4:27 PM by Dr. Gabriel Sadler M.D.            PROCEDURES  Procedures        EKG:           EKG time: 1556  Rhythm/Rate: NSR 68  P waves and ID: nml  QRS, axis: low voltage   ST and T waves: nml     Interpreted Contemporaneously by me, independently viewed  No prior for comparison        MEDICATIONS GIVEN IN ER  Medications   sodium chloride 0.9 % flush 10 mL (not administered)   aspirin chewable tablet 324 mg (324 mg Oral Given 8/7/19 1857)                   PROGRESS AND CONSULTS  ED Course as of Aug 07 1939   Wed Aug 07, 2019   1523 Patient presents with episode of unresponsiveness that lasted for about 60 to 90 seconds.  She quickly came to.  This is then followed by.  Of left-sided facial droop.  This is now resolved and patient is ANO x3.  She has a normal neurologic exam.  She does have some mild diffuse weakness in her extremities with a slow gait.  However, patient states that this is normal for her given her age at 89.  She states that she is walking very well,  especially without a walker at this time.  I am most concerned about syncope versus TIA.  [TD]   1634 Creatinine: (!) 1.22 [TD]   1634 Hemoglobin: (!) 10.1 [TD]      ED Course User Index  [TD] Tung Coburn II, MD     1519- HR 76. /84. Discussed plan to check imaging and labs for further evaluation. Pt understands and agrees with the plan, all questions answered.    1725- Placed call out to [facility] for admission.     1839- Talked to Dr. Johnston [facility], who agrees w/ plan of care and agrees to admit pt for further evaluation.    MEDICAL DECISION MAKING      MDM  Number of Diagnoses or Management Options  Anemia, unspecified type:   Syncope, unspecified syncope type:      Amount and/or Complexity of Data Reviewed  Tests in the radiology section of CPT®: ordered and reviewed (Negative acute CT Head and XR Chest)  Decide to obtain previous medical records or to obtain history from someone other than the patient: yes  Review and summarize past medical records: yes  Discuss the patient with other providers: yes (Dr. Johnston, [facility])               DIAGNOSIS  Final diagnoses:   Syncope, unspecified syncope type   Anemia, unspecified type         DISPOSITION  ADMISSION    Discussed treatment plan and reason for admission with pt/family and admitting physician.  Pt/family voiced understanding of the plan for admission for further testing/treatment as needed.                 Latest Documented Vital Signs:  As of 7:39 PM  BP- 138/79 HR- 63 Temp- 98 °F (36.7 °C) (Oral) O2 sat- 99%        --  Documentation assistance provided by hasmukh Mcdonough for Dr. Almas MD.  Information recorded by the scribe was done at my direction and has been verified and validated by me.     Bethanie Mcdonough  08/07/19 1939       Tung Coburn II, MD  08/07/19 6904     no chest pain/no palpitations/no dyspnea on exertion